# Patient Record
Sex: MALE | Race: WHITE | Employment: OTHER
[De-identification: names, ages, dates, MRNs, and addresses within clinical notes are randomized per-mention and may not be internally consistent; named-entity substitution may affect disease eponyms.]

---

## 2017-02-06 ENCOUNTER — OFFICE VISIT (OUTPATIENT)
Dept: CARDIOLOGY | Facility: CLINIC | Age: 67
End: 2017-02-06

## 2017-02-06 VITALS
WEIGHT: 262.2 LBS | HEIGHT: 70 IN | HEART RATE: 76 BPM | DIASTOLIC BLOOD PRESSURE: 83 MMHG | SYSTOLIC BLOOD PRESSURE: 131 MMHG | BODY MASS INDEX: 37.54 KG/M2

## 2017-02-06 DIAGNOSIS — I10 ESSENTIAL HYPERTENSION: ICD-10-CM

## 2017-02-06 DIAGNOSIS — I25.10 ASHD (ARTERIOSCLEROTIC HEART DISEASE): Primary | ICD-10-CM

## 2017-02-06 DIAGNOSIS — I50.32 CHRONIC DIASTOLIC CHF (CONGESTIVE HEART FAILURE), NYHA CLASS 3 (HCC): ICD-10-CM

## 2017-02-06 DIAGNOSIS — G47.33 OSA (OBSTRUCTIVE SLEEP APNEA): ICD-10-CM

## 2017-02-06 DIAGNOSIS — E66.01 SEVERE OBESITY (BMI 35.0-39.9): ICD-10-CM

## 2017-02-06 PROCEDURE — 93000 ELECTROCARDIOGRAM COMPLETE: CPT | Performed by: INTERNAL MEDICINE

## 2017-02-06 PROCEDURE — G8427 DOCREV CUR MEDS BY ELIG CLIN: HCPCS | Performed by: INTERNAL MEDICINE

## 2017-02-06 PROCEDURE — G8598 ASA/ANTIPLAT THER USED: HCPCS | Performed by: INTERNAL MEDICINE

## 2017-02-06 PROCEDURE — 4040F PNEUMOC VAC/ADMIN/RCVD: CPT | Performed by: INTERNAL MEDICINE

## 2017-02-06 PROCEDURE — G8484 FLU IMMUNIZE NO ADMIN: HCPCS | Performed by: INTERNAL MEDICINE

## 2017-02-06 PROCEDURE — 1123F ACP DISCUSS/DSCN MKR DOCD: CPT | Performed by: INTERNAL MEDICINE

## 2017-02-06 PROCEDURE — 99214 OFFICE O/P EST MOD 30 MIN: CPT | Performed by: INTERNAL MEDICINE

## 2017-02-06 PROCEDURE — 1036F TOBACCO NON-USER: CPT | Performed by: INTERNAL MEDICINE

## 2017-02-06 PROCEDURE — G8419 CALC BMI OUT NRM PARAM NOF/U: HCPCS | Performed by: INTERNAL MEDICINE

## 2017-02-06 PROCEDURE — 3017F COLORECTAL CA SCREEN DOC REV: CPT | Performed by: INTERNAL MEDICINE

## 2017-03-30 ENCOUNTER — OFFICE VISIT (OUTPATIENT)
Dept: PAIN MANAGEMENT | Age: 67
End: 2017-03-30
Payer: MEDICARE

## 2017-03-30 ENCOUNTER — HOSPITAL ENCOUNTER (OUTPATIENT)
Age: 67
Discharge: HOME | End: 2017-03-30
Payer: MEDICARE

## 2017-03-30 VITALS
BODY MASS INDEX: 38.65 KG/M2 | RESPIRATION RATE: 18 BRPM | HEIGHT: 70 IN | HEART RATE: 70 BPM | SYSTOLIC BLOOD PRESSURE: 135 MMHG | WEIGHT: 270 LBS | TEMPERATURE: 96.8 F | DIASTOLIC BLOOD PRESSURE: 81 MMHG

## 2017-03-30 DIAGNOSIS — M79.672 BILATERAL FOOT PAIN: ICD-10-CM

## 2017-03-30 DIAGNOSIS — M79.605 BILATERAL LEG PAIN: ICD-10-CM

## 2017-03-30 DIAGNOSIS — M79.604 BILATERAL LEG PAIN: ICD-10-CM

## 2017-03-30 DIAGNOSIS — M54.40 LOW BACK PAIN WITH SCIATICA, SCIATICA LATERALITY UNSPECIFIED, UNSPECIFIED BACK PAIN LATERALITY, UNSPECIFIED CHRONICITY: ICD-10-CM

## 2017-03-30 DIAGNOSIS — E66.01 MORBID OBESITY DUE TO EXCESS CALORIES (HCC): ICD-10-CM

## 2017-03-30 DIAGNOSIS — G89.29 OTHER CHRONIC PAIN: ICD-10-CM

## 2017-03-30 DIAGNOSIS — M79.671 BILATERAL FOOT PAIN: ICD-10-CM

## 2017-03-30 PROCEDURE — 4040F PNEUMOC VAC/ADMIN/RCVD: CPT | Performed by: PHYSICAL MEDICINE & REHABILITATION

## 2017-03-30 PROCEDURE — 99213 OFFICE O/P EST LOW 20 MIN: CPT | Performed by: PHYSICAL MEDICINE & REHABILITATION

## 2017-03-30 PROCEDURE — 1123F ACP DISCUSS/DSCN MKR DOCD: CPT | Performed by: PHYSICAL MEDICINE & REHABILITATION

## 2017-03-30 PROCEDURE — 1036F TOBACCO NON-USER: CPT | Performed by: PHYSICAL MEDICINE & REHABILITATION

## 2017-03-30 PROCEDURE — G8417 CALC BMI ABV UP PARAM F/U: HCPCS | Performed by: PHYSICAL MEDICINE & REHABILITATION

## 2017-03-30 PROCEDURE — G8484 FLU IMMUNIZE NO ADMIN: HCPCS | Performed by: PHYSICAL MEDICINE & REHABILITATION

## 2017-03-30 PROCEDURE — 3017F COLORECTAL CA SCREEN DOC REV: CPT | Performed by: PHYSICAL MEDICINE & REHABILITATION

## 2017-03-30 PROCEDURE — G8598 ASA/ANTIPLAT THER USED: HCPCS | Performed by: PHYSICAL MEDICINE & REHABILITATION

## 2017-03-30 PROCEDURE — 99211 OFF/OP EST MAY X REQ PHY/QHP: CPT | Performed by: PHYSICAL MEDICINE & REHABILITATION

## 2017-03-30 PROCEDURE — G8427 DOCREV CUR MEDS BY ELIG CLIN: HCPCS | Performed by: PHYSICAL MEDICINE & REHABILITATION

## 2017-03-30 RX ORDER — HYDROCODONE BITARTRATE AND ACETAMINOPHEN 5; 325 MG/1; MG/1
1 TABLET ORAL 2 TIMES DAILY PRN
Qty: 60 TABLET | Refills: 0 | Status: SHIPPED | OUTPATIENT
Start: 2017-06-03 | End: 2017-06-22 | Stop reason: SDUPTHER

## 2017-03-30 RX ORDER — HYDROCODONE BITARTRATE AND ACETAMINOPHEN 5; 325 MG/1; MG/1
1 TABLET ORAL 2 TIMES DAILY PRN
Qty: 60 TABLET | Refills: 0 | Status: SHIPPED | OUTPATIENT
Start: 2017-05-05 | End: 2017-06-22 | Stop reason: SDUPTHER

## 2017-03-30 RX ORDER — HYDROCODONE BITARTRATE AND ACETAMINOPHEN 5; 325 MG/1; MG/1
1 TABLET ORAL 2 TIMES DAILY PRN
Qty: 60 TABLET | Refills: 0 | Status: SHIPPED | OUTPATIENT
Start: 2017-04-06 | End: 2017-06-22 | Stop reason: SDUPTHER

## 2017-05-01 ENCOUNTER — OFFICE VISIT (OUTPATIENT)
Dept: PULMONOLOGY | Age: 67
End: 2017-05-01
Payer: MEDICARE

## 2017-05-01 VITALS
HEIGHT: 70 IN | HEART RATE: 72 BPM | BODY MASS INDEX: 38.65 KG/M2 | OXYGEN SATURATION: 96 % | DIASTOLIC BLOOD PRESSURE: 80 MMHG | TEMPERATURE: 98.2 F | SYSTOLIC BLOOD PRESSURE: 147 MMHG | WEIGHT: 270 LBS | RESPIRATION RATE: 16 BRPM

## 2017-05-01 DIAGNOSIS — Z87.891 PERSONAL HISTORY OF TOBACCO USE: ICD-10-CM

## 2017-05-01 DIAGNOSIS — I50.32 CHRONIC DIASTOLIC HEART FAILURE (HCC): ICD-10-CM

## 2017-05-01 DIAGNOSIS — G47.33 OSA (OBSTRUCTIVE SLEEP APNEA): Primary | ICD-10-CM

## 2017-05-01 DIAGNOSIS — I10 ESSENTIAL HYPERTENSION: ICD-10-CM

## 2017-05-01 DIAGNOSIS — I71.20 THORACIC AORTIC ANEURYSM WITHOUT RUPTURE: ICD-10-CM

## 2017-05-01 DIAGNOSIS — J44.9 CHRONIC OBSTRUCTIVE PULMONARY DISEASE, UNSPECIFIED COPD TYPE (HCC): ICD-10-CM

## 2017-05-01 DIAGNOSIS — Z87.891 SMOKING HISTORY: ICD-10-CM

## 2017-05-01 DIAGNOSIS — E66.09 OBESITY DUE TO EXCESS CALORIES, UNSPECIFIED OBESITY SEVERITY: ICD-10-CM

## 2017-05-01 PROCEDURE — 1036F TOBACCO NON-USER: CPT | Performed by: INTERNAL MEDICINE

## 2017-05-01 PROCEDURE — 3023F SPIROM DOC REV: CPT | Performed by: INTERNAL MEDICINE

## 2017-05-01 PROCEDURE — G0296 VISIT TO DETERM LDCT ELIG: HCPCS | Performed by: INTERNAL MEDICINE

## 2017-05-01 PROCEDURE — G8417 CALC BMI ABV UP PARAM F/U: HCPCS | Performed by: INTERNAL MEDICINE

## 2017-05-01 PROCEDURE — 4040F PNEUMOC VAC/ADMIN/RCVD: CPT | Performed by: INTERNAL MEDICINE

## 2017-05-01 PROCEDURE — 99215 OFFICE O/P EST HI 40 MIN: CPT | Performed by: INTERNAL MEDICINE

## 2017-05-01 PROCEDURE — G8427 DOCREV CUR MEDS BY ELIG CLIN: HCPCS | Performed by: INTERNAL MEDICINE

## 2017-05-01 PROCEDURE — G8926 SPIRO NO PERF OR DOC: HCPCS | Performed by: INTERNAL MEDICINE

## 2017-05-01 PROCEDURE — 3017F COLORECTAL CA SCREEN DOC REV: CPT | Performed by: INTERNAL MEDICINE

## 2017-05-01 PROCEDURE — G8598 ASA/ANTIPLAT THER USED: HCPCS | Performed by: INTERNAL MEDICINE

## 2017-05-01 PROCEDURE — 1123F ACP DISCUSS/DSCN MKR DOCD: CPT | Performed by: INTERNAL MEDICINE

## 2017-05-02 ENCOUNTER — TELEPHONE (OUTPATIENT)
Dept: CASE MANAGEMENT | Age: 67
End: 2017-05-02

## 2017-06-20 DIAGNOSIS — Z79.891 LONG TERM (CURRENT) USE OF OPIATE ANALGESIC: ICD-10-CM

## 2017-06-20 DIAGNOSIS — G89.29 OTHER CHRONIC PAIN: Primary | ICD-10-CM

## 2017-06-22 ENCOUNTER — OFFICE VISIT (OUTPATIENT)
Dept: PAIN MANAGEMENT | Age: 67
End: 2017-06-22
Payer: MEDICARE

## 2017-06-22 ENCOUNTER — HOSPITAL ENCOUNTER (OUTPATIENT)
Dept: LAB | Age: 67
Setting detail: SPECIMEN
Discharge: HOME | End: 2017-06-22
Payer: MEDICARE

## 2017-06-22 VITALS
HEIGHT: 70 IN | SYSTOLIC BLOOD PRESSURE: 124 MMHG | RESPIRATION RATE: 18 BRPM | WEIGHT: 263 LBS | TEMPERATURE: 97.8 F | DIASTOLIC BLOOD PRESSURE: 74 MMHG | BODY MASS INDEX: 37.65 KG/M2 | HEART RATE: 75 BPM

## 2017-06-22 DIAGNOSIS — M79.604 BILATERAL LEG PAIN: ICD-10-CM

## 2017-06-22 DIAGNOSIS — G89.29 CHRONIC LOW BACK PAIN WITH SCIATICA, SCIATICA LATERALITY UNSPECIFIED, UNSPECIFIED BACK PAIN LATERALITY: ICD-10-CM

## 2017-06-22 DIAGNOSIS — M54.40 LOW BACK PAIN WITH SCIATICA, SCIATICA LATERALITY UNSPECIFIED, UNSPECIFIED BACK PAIN LATERALITY, UNSPECIFIED CHRONICITY: ICD-10-CM

## 2017-06-22 DIAGNOSIS — M79.671 BILATERAL FOOT PAIN: ICD-10-CM

## 2017-06-22 DIAGNOSIS — G89.29 OTHER CHRONIC PAIN: Primary | ICD-10-CM

## 2017-06-22 DIAGNOSIS — M79.605 BILATERAL LEG PAIN: ICD-10-CM

## 2017-06-22 DIAGNOSIS — Z79.891 LONG TERM (CURRENT) USE OF OPIATE ANALGESIC: ICD-10-CM

## 2017-06-22 DIAGNOSIS — E66.01 MORBID OBESITY DUE TO EXCESS CALORIES (HCC): ICD-10-CM

## 2017-06-22 DIAGNOSIS — M54.40 CHRONIC LOW BACK PAIN WITH SCIATICA, SCIATICA LATERALITY UNSPECIFIED, UNSPECIFIED BACK PAIN LATERALITY: ICD-10-CM

## 2017-06-22 DIAGNOSIS — E66.01 MORBID OBESITY, UNSPECIFIED OBESITY TYPE (HCC): ICD-10-CM

## 2017-06-22 DIAGNOSIS — G89.29 OTHER CHRONIC PAIN: ICD-10-CM

## 2017-06-22 DIAGNOSIS — M79.672 BILATERAL FOOT PAIN: ICD-10-CM

## 2017-06-22 PROCEDURE — 99212 OFFICE O/P EST SF 10 MIN: CPT

## 2017-06-22 PROCEDURE — 3017F COLORECTAL CA SCREEN DOC REV: CPT | Performed by: PHYSICAL MEDICINE & REHABILITATION

## 2017-06-22 PROCEDURE — 99214 OFFICE O/P EST MOD 30 MIN: CPT | Performed by: PHYSICAL MEDICINE & REHABILITATION

## 2017-06-22 PROCEDURE — 4040F PNEUMOC VAC/ADMIN/RCVD: CPT | Performed by: PHYSICAL MEDICINE & REHABILITATION

## 2017-06-22 PROCEDURE — 1123F ACP DISCUSS/DSCN MKR DOCD: CPT | Performed by: PHYSICAL MEDICINE & REHABILITATION

## 2017-06-22 PROCEDURE — G8598 ASA/ANTIPLAT THER USED: HCPCS | Performed by: PHYSICAL MEDICINE & REHABILITATION

## 2017-06-22 PROCEDURE — 80307 DRUG TEST PRSMV CHEM ANLYZR: CPT

## 2017-06-22 PROCEDURE — G8417 CALC BMI ABV UP PARAM F/U: HCPCS | Performed by: PHYSICAL MEDICINE & REHABILITATION

## 2017-06-22 PROCEDURE — G8427 DOCREV CUR MEDS BY ELIG CLIN: HCPCS | Performed by: PHYSICAL MEDICINE & REHABILITATION

## 2017-06-22 PROCEDURE — 1036F TOBACCO NON-USER: CPT | Performed by: PHYSICAL MEDICINE & REHABILITATION

## 2017-06-22 RX ORDER — HYDROCODONE BITARTRATE AND ACETAMINOPHEN 5; 325 MG/1; MG/1
1 TABLET ORAL EVERY 8 HOURS PRN
Qty: 90 TABLET | Refills: 0 | Status: SHIPPED | OUTPATIENT
Start: 2017-06-22 | End: 2017-09-14 | Stop reason: SDUPTHER

## 2017-06-22 RX ORDER — HYDROCODONE BITARTRATE AND ACETAMINOPHEN 5; 325 MG/1; MG/1
1 TABLET ORAL EVERY 8 HOURS PRN
Qty: 90 TABLET | Refills: 0 | Status: SHIPPED | OUTPATIENT
Start: 2017-07-22 | End: 2017-08-17 | Stop reason: SDUPTHER

## 2017-06-22 RX ORDER — HYDROCODONE BITARTRATE AND ACETAMINOPHEN 5; 325 MG/1; MG/1
1 TABLET ORAL EVERY 8 HOURS PRN
Qty: 90 TABLET | Refills: 0 | Status: SHIPPED | OUTPATIENT
Start: 2017-08-21 | End: 2017-08-17 | Stop reason: SDUPTHER

## 2017-06-27 LAB
6-ACETYLMORPHINE, UR: NOT DETECTED
7-AMINOCLONAZEPAM, URINE: NOT DETECTED
ALPHA-OH-ALPRAZ, URINE: NOT DETECTED
ALPRAZOLAM, URINE: NOT DETECTED
AMPHETAMINES, URINE: NOT DETECTED
BARBITURATES, URINE: NOT DETECTED
BENZOYLECGONINE, UR: NOT DETECTED
BUPRENORPHINE URINE: NOT DETECTED
CARISOPRODOL, UR: NOT DETECTED
CLONAZEPAM, URINE: NOT DETECTED
CODEINE, URINE: NOT DETECTED
CREATININE URINE: 374.4 MG/DL (ref 20–400)
DIAZEPAM, URINE: NOT DETECTED
DRUGS EXPECTED, UR: NORMAL
EER HI RES INTERP UR: NORMAL
ETHYL GLUCURONIDE UR: NOT DETECTED
FENTANYL URINE: NOT DETECTED
HYDROCODONE, URINE: PRESENT
HYDROMORPHONE, URINE: PRESENT
LORAZEPAM, URINE: NOT DETECTED
MARIJUANA METAB, UR: NOT DETECTED
MDA, UR: NOT DETECTED
MDEA, EVE, UR: NOT DETECTED
MDMA URINE: NOT DETECTED
MEPERIDINE METAB, UR: NOT DETECTED
METHADONE, URINE: NOT DETECTED
METHAMPHETAMINE, URINE: NOT DETECTED
METHYLPHENIDATE: NOT DETECTED
MIDAZOLAM, URINE: NOT DETECTED
MORPHINE URINE: NOT DETECTED
NORBUPRENORPHINE, URINE: NOT DETECTED
NORDIAZEPAM, URINE: NOT DETECTED
NORFENTANYL, URINE: NOT DETECTED
NORHYDROCODONE, URINE: PRESENT
NOROXYCODONE, URINE: NOT DETECTED
NOROXYMORPHONE, URINE: NOT DETECTED
OXAZEPAM, URINE: NOT DETECTED
OXYCODONE URINE: NOT DETECTED
OXYMORPHONE, URINE: NOT DETECTED
PAIN MANAGEMENT DRUG PANEL INTERP, URINE: NORMAL
PAIN MGT DRUG PANEL, HI RES, UR: NORMAL
PCP,URINE: NOT DETECTED
PHENTERMINE, UR: NOT DETECTED
PROPOXYPHENE, URINE: NOT DETECTED
TAPENTADOL, URINE: NOT DETECTED
TAPENTADOL-O-SULFATE, URINE: NOT DETECTED
TEMAZEPAM, URINE: NOT DETECTED
TRAMADOL, URINE: NOT DETECTED
ZOLPIDEM, URINE: NOT DETECTED

## 2017-07-26 PROBLEM — F32.A DEPRESSION: Status: ACTIVE | Noted: 2017-07-26

## 2017-08-07 ENCOUNTER — OFFICE VISIT (OUTPATIENT)
Dept: PULMONOLOGY | Age: 67
End: 2017-08-07
Payer: MEDICARE

## 2017-08-07 VITALS
BODY MASS INDEX: 38.65 KG/M2 | OXYGEN SATURATION: 97 % | TEMPERATURE: 99.3 F | DIASTOLIC BLOOD PRESSURE: 75 MMHG | WEIGHT: 270 LBS | HEIGHT: 70 IN | HEART RATE: 79 BPM | RESPIRATION RATE: 16 BRPM | SYSTOLIC BLOOD PRESSURE: 127 MMHG

## 2017-08-07 DIAGNOSIS — I71.20 THORACIC AORTIC ANEURYSM WITHOUT RUPTURE: ICD-10-CM

## 2017-08-07 DIAGNOSIS — Z87.891 SMOKING HISTORY: ICD-10-CM

## 2017-08-07 DIAGNOSIS — I10 ESSENTIAL HYPERTENSION: ICD-10-CM

## 2017-08-07 DIAGNOSIS — E66.09 OBESITY DUE TO EXCESS CALORIES, UNSPECIFIED OBESITY SEVERITY: ICD-10-CM

## 2017-08-07 DIAGNOSIS — I50.32 CHRONIC DIASTOLIC HEART FAILURE (HCC): ICD-10-CM

## 2017-08-07 DIAGNOSIS — J44.9 CHRONIC OBSTRUCTIVE PULMONARY DISEASE, UNSPECIFIED COPD TYPE (HCC): ICD-10-CM

## 2017-08-07 DIAGNOSIS — Z87.891 PERSONAL HISTORY OF TOBACCO USE: ICD-10-CM

## 2017-08-07 DIAGNOSIS — G47.33 OSA (OBSTRUCTIVE SLEEP APNEA): Primary | ICD-10-CM

## 2017-08-07 PROCEDURE — G8427 DOCREV CUR MEDS BY ELIG CLIN: HCPCS | Performed by: INTERNAL MEDICINE

## 2017-08-07 PROCEDURE — 1123F ACP DISCUSS/DSCN MKR DOCD: CPT | Performed by: INTERNAL MEDICINE

## 2017-08-07 PROCEDURE — 4040F PNEUMOC VAC/ADMIN/RCVD: CPT | Performed by: INTERNAL MEDICINE

## 2017-08-07 PROCEDURE — 99215 OFFICE O/P EST HI 40 MIN: CPT | Performed by: INTERNAL MEDICINE

## 2017-08-07 PROCEDURE — G8417 CALC BMI ABV UP PARAM F/U: HCPCS | Performed by: INTERNAL MEDICINE

## 2017-08-07 PROCEDURE — G8926 SPIRO NO PERF OR DOC: HCPCS | Performed by: INTERNAL MEDICINE

## 2017-08-07 PROCEDURE — G8598 ASA/ANTIPLAT THER USED: HCPCS | Performed by: INTERNAL MEDICINE

## 2017-08-07 PROCEDURE — 3017F COLORECTAL CA SCREEN DOC REV: CPT | Performed by: INTERNAL MEDICINE

## 2017-08-07 PROCEDURE — 1036F TOBACCO NON-USER: CPT | Performed by: INTERNAL MEDICINE

## 2017-08-07 PROCEDURE — 3023F SPIROM DOC REV: CPT | Performed by: INTERNAL MEDICINE

## 2017-08-08 ENCOUNTER — TELEPHONE (OUTPATIENT)
Dept: PAIN MANAGEMENT | Age: 67
End: 2017-08-08

## 2017-08-08 DIAGNOSIS — M79.605 BILATERAL LEG PAIN: ICD-10-CM

## 2017-08-08 DIAGNOSIS — M54.40 LOW BACK PAIN WITH SCIATICA, SCIATICA LATERALITY UNSPECIFIED, UNSPECIFIED BACK PAIN LATERALITY, UNSPECIFIED CHRONICITY: ICD-10-CM

## 2017-08-08 DIAGNOSIS — G89.29 CHRONIC LOW BACK PAIN WITH SCIATICA, SCIATICA LATERALITY UNSPECIFIED, UNSPECIFIED BACK PAIN LATERALITY: ICD-10-CM

## 2017-08-08 DIAGNOSIS — E66.01 MORBID OBESITY DUE TO EXCESS CALORIES (HCC): ICD-10-CM

## 2017-08-08 DIAGNOSIS — G89.29 OTHER CHRONIC PAIN: ICD-10-CM

## 2017-08-08 DIAGNOSIS — M79.604 BILATERAL LEG PAIN: ICD-10-CM

## 2017-08-08 DIAGNOSIS — M54.40 CHRONIC LOW BACK PAIN WITH SCIATICA, SCIATICA LATERALITY UNSPECIFIED, UNSPECIFIED BACK PAIN LATERALITY: ICD-10-CM

## 2017-08-08 DIAGNOSIS — M79.672 BILATERAL FOOT PAIN: ICD-10-CM

## 2017-08-08 DIAGNOSIS — M79.671 BILATERAL FOOT PAIN: ICD-10-CM

## 2017-08-08 DIAGNOSIS — E66.01 MORBID OBESITY, UNSPECIFIED OBESITY TYPE (HCC): ICD-10-CM

## 2017-08-14 ENCOUNTER — OFFICE VISIT (OUTPATIENT)
Dept: CARDIOLOGY | Age: 67
End: 2017-08-14
Payer: MEDICARE

## 2017-08-14 VITALS
OXYGEN SATURATION: 96 % | SYSTOLIC BLOOD PRESSURE: 135 MMHG | HEIGHT: 70 IN | BODY MASS INDEX: 39.43 KG/M2 | HEART RATE: 67 BPM | WEIGHT: 275.4 LBS | DIASTOLIC BLOOD PRESSURE: 76 MMHG

## 2017-08-14 DIAGNOSIS — E78.2 MIXED HYPERLIPIDEMIA: ICD-10-CM

## 2017-08-14 DIAGNOSIS — I50.32 CHRONIC DIASTOLIC CHF (CONGESTIVE HEART FAILURE), NYHA CLASS 3 (HCC): ICD-10-CM

## 2017-08-14 DIAGNOSIS — I10 ESSENTIAL HYPERTENSION: ICD-10-CM

## 2017-08-14 DIAGNOSIS — M79.604 BILATERAL LEG PAIN: ICD-10-CM

## 2017-08-14 DIAGNOSIS — M79.605 BILATERAL LEG PAIN: ICD-10-CM

## 2017-08-14 DIAGNOSIS — I34.0 NON-RHEUMATIC MITRAL REGURGITATION: ICD-10-CM

## 2017-08-14 DIAGNOSIS — Z86.73 PERSONAL HISTORY OF TIA (TRANSIENT ISCHEMIC ATTACK): ICD-10-CM

## 2017-08-14 DIAGNOSIS — E78.5 DYSLIPIDEMIA: Primary | ICD-10-CM

## 2017-08-14 DIAGNOSIS — K21.9 GASTROESOPHAGEAL REFLUX DISEASE WITHOUT ESOPHAGITIS: ICD-10-CM

## 2017-08-14 DIAGNOSIS — G47.33 OSA (OBSTRUCTIVE SLEEP APNEA): ICD-10-CM

## 2017-08-14 PROCEDURE — 4040F PNEUMOC VAC/ADMIN/RCVD: CPT | Performed by: INTERNAL MEDICINE

## 2017-08-14 PROCEDURE — G8427 DOCREV CUR MEDS BY ELIG CLIN: HCPCS | Performed by: INTERNAL MEDICINE

## 2017-08-14 PROCEDURE — 3017F COLORECTAL CA SCREEN DOC REV: CPT | Performed by: INTERNAL MEDICINE

## 2017-08-14 PROCEDURE — G8417 CALC BMI ABV UP PARAM F/U: HCPCS | Performed by: INTERNAL MEDICINE

## 2017-08-14 PROCEDURE — G8598 ASA/ANTIPLAT THER USED: HCPCS | Performed by: INTERNAL MEDICINE

## 2017-08-14 PROCEDURE — 1123F ACP DISCUSS/DSCN MKR DOCD: CPT | Performed by: INTERNAL MEDICINE

## 2017-08-14 PROCEDURE — 99214 OFFICE O/P EST MOD 30 MIN: CPT | Performed by: INTERNAL MEDICINE

## 2017-08-14 PROCEDURE — 1036F TOBACCO NON-USER: CPT | Performed by: INTERNAL MEDICINE

## 2017-08-17 RX ORDER — HYDROCODONE BITARTRATE AND ACETAMINOPHEN 5; 325 MG/1; MG/1
1 TABLET ORAL EVERY 8 HOURS PRN
Qty: 90 TABLET | Refills: 0 | Status: SHIPPED | OUTPATIENT
Start: 2017-09-08 | End: 2017-09-14 | Stop reason: SDUPTHER

## 2017-09-14 ENCOUNTER — OFFICE VISIT (OUTPATIENT)
Dept: PAIN MANAGEMENT | Age: 67
End: 2017-09-14
Payer: MEDICARE

## 2017-09-14 VITALS
SYSTOLIC BLOOD PRESSURE: 164 MMHG | WEIGHT: 271 LBS | TEMPERATURE: 97.9 F | HEART RATE: 95 BPM | BODY MASS INDEX: 38.88 KG/M2 | DIASTOLIC BLOOD PRESSURE: 102 MMHG | RESPIRATION RATE: 18 BRPM

## 2017-09-14 DIAGNOSIS — M79.604 BILATERAL LEG PAIN: ICD-10-CM

## 2017-09-14 DIAGNOSIS — E66.01 MORBID OBESITY DUE TO EXCESS CALORIES (HCC): ICD-10-CM

## 2017-09-14 DIAGNOSIS — M54.40 LOW BACK PAIN WITH SCIATICA, SCIATICA LATERALITY UNSPECIFIED, UNSPECIFIED BACK PAIN LATERALITY, UNSPECIFIED CHRONICITY: ICD-10-CM

## 2017-09-14 DIAGNOSIS — G89.29 OTHER CHRONIC PAIN: Primary | ICD-10-CM

## 2017-09-14 DIAGNOSIS — E66.01 MORBID OBESITY, UNSPECIFIED OBESITY TYPE (HCC): ICD-10-CM

## 2017-09-14 DIAGNOSIS — M79.671 BILATERAL FOOT PAIN: ICD-10-CM

## 2017-09-14 DIAGNOSIS — M79.672 BILATERAL FOOT PAIN: ICD-10-CM

## 2017-09-14 DIAGNOSIS — M54.40 CHRONIC LOW BACK PAIN WITH SCIATICA, SCIATICA LATERALITY UNSPECIFIED, UNSPECIFIED BACK PAIN LATERALITY: ICD-10-CM

## 2017-09-14 DIAGNOSIS — G89.29 CHRONIC LOW BACK PAIN WITH SCIATICA, SCIATICA LATERALITY UNSPECIFIED, UNSPECIFIED BACK PAIN LATERALITY: ICD-10-CM

## 2017-09-14 DIAGNOSIS — M79.605 BILATERAL LEG PAIN: ICD-10-CM

## 2017-09-14 PROCEDURE — G8427 DOCREV CUR MEDS BY ELIG CLIN: HCPCS | Performed by: PHYSICAL MEDICINE & REHABILITATION

## 2017-09-14 PROCEDURE — G8598 ASA/ANTIPLAT THER USED: HCPCS | Performed by: PHYSICAL MEDICINE & REHABILITATION

## 2017-09-14 PROCEDURE — 99211 OFF/OP EST MAY X REQ PHY/QHP: CPT | Performed by: COUNSELOR

## 2017-09-14 PROCEDURE — 1036F TOBACCO NON-USER: CPT | Performed by: PHYSICAL MEDICINE & REHABILITATION

## 2017-09-14 PROCEDURE — 3017F COLORECTAL CA SCREEN DOC REV: CPT | Performed by: PHYSICAL MEDICINE & REHABILITATION

## 2017-09-14 PROCEDURE — 4040F PNEUMOC VAC/ADMIN/RCVD: CPT | Performed by: PHYSICAL MEDICINE & REHABILITATION

## 2017-09-14 PROCEDURE — G8417 CALC BMI ABV UP PARAM F/U: HCPCS | Performed by: PHYSICAL MEDICINE & REHABILITATION

## 2017-09-14 PROCEDURE — 99214 OFFICE O/P EST MOD 30 MIN: CPT | Performed by: PHYSICAL MEDICINE & REHABILITATION

## 2017-09-14 PROCEDURE — 1123F ACP DISCUSS/DSCN MKR DOCD: CPT | Performed by: PHYSICAL MEDICINE & REHABILITATION

## 2017-09-14 RX ORDER — HYDROCODONE BITARTRATE AND ACETAMINOPHEN 5; 325 MG/1; MG/1
1 TABLET ORAL EVERY 8 HOURS PRN
Qty: 90 TABLET | Refills: 0 | Status: SHIPPED | OUTPATIENT
Start: 2017-09-14 | End: 2017-12-14 | Stop reason: SDUPTHER

## 2017-09-14 RX ORDER — HYDROCODONE BITARTRATE AND ACETAMINOPHEN 5; 325 MG/1; MG/1
1 TABLET ORAL EVERY 8 HOURS PRN
Qty: 90 TABLET | Refills: 0 | Status: SHIPPED | OUTPATIENT
Start: 2017-10-14 | End: 2017-12-14 | Stop reason: SDUPTHER

## 2017-09-14 RX ORDER — HYDROCODONE BITARTRATE AND ACETAMINOPHEN 5; 325 MG/1; MG/1
1 TABLET ORAL EVERY 8 HOURS PRN
Qty: 90 TABLET | Refills: 0 | Status: SHIPPED | OUTPATIENT
Start: 2017-11-13 | End: 2017-12-14 | Stop reason: SDUPTHER

## 2017-12-14 ENCOUNTER — OFFICE VISIT (OUTPATIENT)
Dept: PAIN MANAGEMENT | Age: 67
End: 2017-12-14
Payer: MEDICARE

## 2017-12-14 VITALS
DIASTOLIC BLOOD PRESSURE: 65 MMHG | TEMPERATURE: 97 F | SYSTOLIC BLOOD PRESSURE: 117 MMHG | RESPIRATION RATE: 20 BRPM | WEIGHT: 275 LBS | HEART RATE: 83 BPM | BODY MASS INDEX: 39.37 KG/M2 | HEIGHT: 70 IN

## 2017-12-14 DIAGNOSIS — M54.40 LOW BACK PAIN WITH SCIATICA, SCIATICA LATERALITY UNSPECIFIED, UNSPECIFIED BACK PAIN LATERALITY, UNSPECIFIED CHRONICITY: ICD-10-CM

## 2017-12-14 DIAGNOSIS — M79.604 BILATERAL LEG PAIN: ICD-10-CM

## 2017-12-14 DIAGNOSIS — G89.29 CHRONIC LOW BACK PAIN WITH SCIATICA, SCIATICA LATERALITY UNSPECIFIED, UNSPECIFIED BACK PAIN LATERALITY: ICD-10-CM

## 2017-12-14 DIAGNOSIS — G89.29 OTHER CHRONIC PAIN: ICD-10-CM

## 2017-12-14 DIAGNOSIS — E66.01 MORBID OBESITY, UNSPECIFIED OBESITY TYPE (HCC): ICD-10-CM

## 2017-12-14 DIAGNOSIS — M79.605 BILATERAL LEG PAIN: ICD-10-CM

## 2017-12-14 DIAGNOSIS — M54.40 CHRONIC LOW BACK PAIN WITH SCIATICA, SCIATICA LATERALITY UNSPECIFIED, UNSPECIFIED BACK PAIN LATERALITY: ICD-10-CM

## 2017-12-14 DIAGNOSIS — M79.671 BILATERAL FOOT PAIN: ICD-10-CM

## 2017-12-14 DIAGNOSIS — E66.01 MORBID OBESITY DUE TO EXCESS CALORIES (HCC): ICD-10-CM

## 2017-12-14 DIAGNOSIS — M79.672 BILATERAL FOOT PAIN: ICD-10-CM

## 2017-12-14 PROCEDURE — 99211 OFF/OP EST MAY X REQ PHY/QHP: CPT

## 2017-12-14 PROCEDURE — 99214 OFFICE O/P EST MOD 30 MIN: CPT | Performed by: PHYSICAL MEDICINE & REHABILITATION

## 2017-12-14 RX ORDER — HYDROCODONE BITARTRATE AND ACETAMINOPHEN 5; 325 MG/1; MG/1
1 TABLET ORAL EVERY 8 HOURS PRN
COMMUNITY
End: 2019-03-12 | Stop reason: ALTCHOICE

## 2017-12-14 RX ORDER — HYDROCODONE BITARTRATE AND ACETAMINOPHEN 5; 325 MG/1; MG/1
1 TABLET ORAL EVERY 8 HOURS PRN
Qty: 90 TABLET | Refills: 0 | Status: SHIPPED | OUTPATIENT
Start: 2017-12-22 | End: 2018-03-15 | Stop reason: SDUPTHER

## 2017-12-14 RX ORDER — HYDROCODONE BITARTRATE AND ACETAMINOPHEN 5; 325 MG/1; MG/1
1 TABLET ORAL EVERY 8 HOURS PRN
Qty: 90 TABLET | Refills: 0 | Status: SHIPPED | OUTPATIENT
Start: 2018-01-21 | End: 2018-03-15 | Stop reason: SDUPTHER

## 2017-12-14 RX ORDER — HYDROCODONE BITARTRATE AND ACETAMINOPHEN 5; 325 MG/1; MG/1
1 TABLET ORAL EVERY 8 HOURS PRN
Qty: 90 TABLET | Refills: 0 | Status: SHIPPED | OUTPATIENT
Start: 2018-02-20 | End: 2018-03-15 | Stop reason: SDUPTHER

## 2017-12-14 NOTE — PROGRESS NOTES
Subjective:      Patient ID: Nuzhat Obando is a 75 y.o. male. Visit Information    Have you changed or started any medications since your last visit including any over-the-counter medicines, vitamins, or herbal medicines? no     Are you having any side effects from any of your medications? -  no    Any difficulty with constipation? No    Do you feel rested upon wakening? Yes      Have you stopped taking any of your medications or changed how you are taking your medication? Is so, why? -  no    What are you able to do with the current treatment plan that you were not able to do prior to treatment?decreases the pain    Have you seen any other physician or provider since your last visit? Have you had any other diagnostic tests since your last visit? Have you been seen in the emergency room and/or had an admission to a hospital since we last saw you? No    Did you receive pain medication from another provider? No    Are you avoiding alcohol? Yes    Have you activated your [employer] account? If not, what are your barriers? No       Leg Pain   Pertinent negatives include no numbness. Back Pain  Associated symptoms include chest pain, headaches and leg pain. Pertinent negatives include no abdominal pain, fever or numbness. Knee Pain   Pertinent negatives include no numbness. Nuzhat Obando feels symptoms of bilateral knee, lower leg and low back are persistent. Patient has no new complaint today. Patient feels home exercise program has helped them to function better with ADL's. Patient feels medicine has helped them function better with ADL's. Patient reports their average pain score is 7/10  with medication and at worst 10+/10 with out pain medication. Patient feels like our current treatment program is helping over all. Nuzhat Obando states they do not have changes to there Family History since the last visit.    Nuzhat Obando states they do not have changes to there Social History right and left without any gross abnormalities. Psychiatric:  Patient is alert and show normal insight. Patient has normal affect. Musculoskeletal:  Gait appears to be abnormal with ambulating. No edema noted. No tenderness. Assessment:         ICD-10-CM ICD-9-CM    1. Bilateral leg pain M79.604 729.5 HYDROcodone-acetaminophen (NORCO) 5-325 MG per tablet    M79.605  HYDROcodone-acetaminophen (NORCO) 5-325 MG per tablet      HYDROcodone-acetaminophen (NORCO) 5-325 MG per tablet   2. Bilateral foot pain M79.671 729.5 HYDROcodone-acetaminophen (NORCO) 5-325 MG per tablet    M79.672  HYDROcodone-acetaminophen (NORCO) 5-325 MG per tablet      HYDROcodone-acetaminophen (NORCO) 5-325 MG per tablet   3. Low back pain with sciatica, sciatica laterality unspecified, unspecified back pain laterality, unspecified chronicity M54.40 724.3 HYDROcodone-acetaminophen (NORCO) 5-325 MG per tablet      HYDROcodone-acetaminophen (NORCO) 5-325 MG per tablet      HYDROcodone-acetaminophen (NORCO) 5-325 MG per tablet   4. Morbid obesity due to excess calories ([facility]) E66.01 278.01 HYDROcodone-acetaminophen (NORCO) 5-325 MG per tablet      HYDROcodone-acetaminophen (NORCO) 5-325 MG per tablet      HYDROcodone-acetaminophen (NORCO) 5-325 MG per tablet   5. Other chronic pain G89.29 338.29 HYDROcodone-acetaminophen (NORCO) 5-325 MG per tablet      HYDROcodone-acetaminophen (NORCO) 5-325 MG per tablet      HYDROcodone-acetaminophen (NORCO) 5-325 MG per tablet   6. Chronic low back pain with sciatica, sciatica laterality unspecified, unspecified back pain laterality M54.40 724.2 HYDROcodone-acetaminophen (NORCO) 5-325 MG per tablet    G89.29 724.3 HYDROcodone-acetaminophen (NORCO) 5-325 MG per tablet     338.29 HYDROcodone-acetaminophen (NORCO) 5-325 MG per tablet   7.  Morbid obesity, unspecified obesity type ([facility] E66.01 278.01 HYDROcodone-acetaminophen (NORCO) 5-325 MG per tablet      HYDROcodone-acetaminophen (NORCO) 5-325 MG per tablet      HYDROcodone-acetaminophen (NORCO) 5-325 MG per tablet           Plan:       In my professional opinion I believe that the Magen Sears should continue their current home exercise program which is both medically appropriate and necessary to reduce pain and increase functioning. In my professional opinion I believe that Magen Sears should continue their current medications as shown in the current medication section. Because it's reasonably related to the allowed conditions and are medically necessary and appropriate for treatment and control of associated symptoms. I would also like Magen Orion to discontinue the following medications:    Discontinued Medications    No medications on file       I would like Magen Sears to use the following medications:    Modified Medications    Modified Medication Previous Medication    HYDROCODONE-ACETAMINOPHEN (NORCO) 5-325 MG PER TABLET HYDROcodone-acetaminophen (NORCO) 5-325 MG per tablet       Take 1 tablet by mouth every 8 hours as needed for Pain . Take 1 tablet by mouth every 8 hours as needed for Pain . HYDROCODONE-ACETAMINOPHEN (NORCO) 5-325 MG PER TABLET HYDROcodone-acetaminophen (NORCO) 5-325 MG per tablet       Take 1 tablet by mouth every 8 hours as needed for Pain . Take 1 tablet by mouth every 8 hours as needed for Pain . HYDROCODONE-ACETAMINOPHEN (NORCO) 5-325 MG PER TABLET HYDROcodone-acetaminophen (NORCO) 5-325 MG per tablet       Take 1 tablet by mouth every 8 hours as needed for Pain . Take 1 tablet by mouth every 8 hours as needed for Pain . I would like Magen Orion to use the following medications:    New Prescriptions    No medications on file     I discussed with the Magen Seras about the management of controlled medications being prescribed in the current medication section. I instructed the patient on random urine tox screens, pill counts and OARRS reporting. Also instructed on letting us know if any other physicians are writing medications for them.  As

## 2017-12-14 NOTE — PROGRESS NOTES
Visit Information    Have you changed or started any medications since your last visit including any over-the-counter medicines, vitamins, or herbal medicines? no     Are you having any side effects from any of your medications? -  no    Any difficulty with constipation? No    Do you feel rested upon wakening? Yes      Have you stopped taking any of your medications or changed how you are taking your medication? Is so, why? -  no    What are you able to do with the current treatment plan that you were not able to do prior to treatment?decreases the pain        Have you seen any other physician or provider since your last visit? Have you had any other diagnostic tests since your last visit? Have you been seen in the emergency room and/or had an admission to a hospital since we last saw you? No    Did you receive pain medication from another provider? No    Are you avoiding alcohol? Yes    Have you activated your [employer] account? If not, what are your barriers?  No

## 2017-12-14 NOTE — PATIENT INSTRUCTIONS
SURVEY:    You may be receiving a survey from [employer] regarding your visit today. Please complete the survey to enable us to provide the highest quality of care to you and your family. If you cannot score us a very good on any question, please call the office to discuss how we could of made your experience a very good one. Thank you. Reviewed medication safety with pt today. 1.  Use one pharmacy and notify our office if a change in pharmacy occurs. 2.  Only one physician is to prescribe pain medication. 3.  Take medication as prescribed. Do NOT increase on your own. 4.  Do not take medication that does not belong to you. 5.  Lost or stolen medication will not be replaced. 6.  Refrain from using alcohol while taking narcotic pain medication. If alcohol is positive in urine a warning will be issued and second offense may    Result in discharge from our practice. Contact information MUST be kept current. Prior authorizations for medication can take 24 to 72 business hours. Refill requests should be made a week in advance if needed. It is the patient's responsibility to notify our office of refill requests NOT the pharmacy. Remember that refills should be addressed at your visit to prevent delays in your refill. Regarding your billing: This is a provider based clinic. Therefore you will receive 2 bills. One from the hospital billing service and one from E - the physicians billing service.

## 2018-03-15 ENCOUNTER — OFFICE VISIT (OUTPATIENT)
Dept: PAIN MANAGEMENT | Age: 68
End: 2018-03-15
Payer: MEDICARE

## 2018-03-15 VITALS
WEIGHT: 269 LBS | BODY MASS INDEX: 38.51 KG/M2 | RESPIRATION RATE: 20 BRPM | HEIGHT: 70 IN | HEART RATE: 97 BPM | TEMPERATURE: 97.9 F | DIASTOLIC BLOOD PRESSURE: 77 MMHG | SYSTOLIC BLOOD PRESSURE: 139 MMHG

## 2018-03-15 DIAGNOSIS — G89.29 CHRONIC LOW BACK PAIN WITH SCIATICA, SCIATICA LATERALITY UNSPECIFIED, UNSPECIFIED BACK PAIN LATERALITY: ICD-10-CM

## 2018-03-15 DIAGNOSIS — M54.40 LOW BACK PAIN WITH SCIATICA, SCIATICA LATERALITY UNSPECIFIED, UNSPECIFIED BACK PAIN LATERALITY, UNSPECIFIED CHRONICITY: ICD-10-CM

## 2018-03-15 DIAGNOSIS — E66.01 MORBID OBESITY DUE TO EXCESS CALORIES (HCC): ICD-10-CM

## 2018-03-15 DIAGNOSIS — M79.671 BILATERAL FOOT PAIN: ICD-10-CM

## 2018-03-15 DIAGNOSIS — M79.604 BILATERAL LEG PAIN: ICD-10-CM

## 2018-03-15 DIAGNOSIS — M79.672 BILATERAL FOOT PAIN: ICD-10-CM

## 2018-03-15 DIAGNOSIS — M54.40 CHRONIC LOW BACK PAIN WITH SCIATICA, SCIATICA LATERALITY UNSPECIFIED, UNSPECIFIED BACK PAIN LATERALITY: ICD-10-CM

## 2018-03-15 DIAGNOSIS — E66.01 MORBID OBESITY, UNSPECIFIED OBESITY TYPE (HCC): ICD-10-CM

## 2018-03-15 DIAGNOSIS — M79.605 BILATERAL LEG PAIN: ICD-10-CM

## 2018-03-15 DIAGNOSIS — G89.29 OTHER CHRONIC PAIN: Primary | ICD-10-CM

## 2018-03-15 PROCEDURE — 99211 OFF/OP EST MAY X REQ PHY/QHP: CPT

## 2018-03-15 PROCEDURE — 99214 OFFICE O/P EST MOD 30 MIN: CPT | Performed by: PHYSICAL MEDICINE & REHABILITATION

## 2018-03-15 RX ORDER — HYDROCODONE BITARTRATE AND ACETAMINOPHEN 5; 325 MG/1; MG/1
1 TABLET ORAL EVERY 8 HOURS PRN
Qty: 90 TABLET | Refills: 0 | Status: SHIPPED | OUTPATIENT
Start: 2018-03-30 | End: 2018-04-29

## 2018-03-15 RX ORDER — HYDROCODONE BITARTRATE AND ACETAMINOPHEN 5; 325 MG/1; MG/1
1 TABLET ORAL EVERY 8 HOURS PRN
Qty: 90 TABLET | Refills: 0 | Status: SHIPPED | OUTPATIENT
Start: 2018-05-29 | End: 2018-06-28

## 2018-03-15 RX ORDER — HYDROCODONE BITARTRATE AND ACETAMINOPHEN 5; 325 MG/1; MG/1
1 TABLET ORAL EVERY 8 HOURS PRN
Qty: 90 TABLET | Refills: 0 | Status: SHIPPED | OUTPATIENT
Start: 2018-04-29 | End: 2018-05-29

## 2018-03-15 NOTE — PROGRESS NOTES
Visit Information    Have you changed or started any medications since your last visit including any over-the-counter medicines, vitamins, or herbal medicines? no     Are you having any side effects from any of your medications? -  no    Any difficulty with constipation? No    Do you feel rested upon wakening? Yes      Have you stopped taking any of your medications or changed how you are taking your medication? Is so, why? -  no    What are you able to do with the current treatment plan that you were not able to do prior to treatment? Helps decrease pain in my leg        Have you seen any other physician or provider since your last visit? PCP      Have you had any other diagnostic tests since your last visit? Have you been seen in the emergency room and/or had an admission to a hospital since we last saw you? No    Did you receive pain medication from another provider? No    Are you avoiding alcohol? Yes    Have you activated your [employer] account? If not, what are your barriers?  No

## 2018-03-15 NOTE — PATIENT INSTRUCTIONS
SURVEY:    You may be receiving a survey from [employer] regarding your visit today. Please complete the survey to enable us to provide the highest quality of care to you and your family. If you cannot score us a very good on any question, please call the office to discuss how we could of made your experience a very good one. Thank you. Reviewed medication safety with pt today. 1.  Use one pharmacy and notify our office if a change in pharmacy occurs. 2.  Only one physician is to prescribe pain medication. 3.  Take medication as prescribed. Do NOT increase on your own. 4.  Do not take medication that does not belong to you. 5.  Lost or stolen medication will not be replaced. 6.  Refrain from using alcohol while taking narcotic pain medication. If alcohol is positive in urine a warning will be issued and second offense may    Result in discharge from our practice. Contact information MUST be kept current. Prior authorizations for medication can take 24 to 72 business hours. Refill requests should be made a week in advance if needed. It is the patient's responsibility to notify our office of refill requests NOT the pharmacy. Remember that refills should be addressed at your visit to prevent delays in your refill. Regarding your billing: This is a provider based clinic. Therefore you will receive 2 bills. One from the hospital billing service and one from E - the physicians billing service.

## 2018-03-15 NOTE — PROGRESS NOTES
grossly intact on the right and left without any gross abnormalities. Psychiatric:  Patient is alert and show normal insight. Patient has normal affect. Musculoskeletal:  Gait appears to be abnormal with ambulating. No edema noted. No tenderness. Assessment:         ICD-10-CM ICD-9-CM    1. Other chronic pain G89.29 338.29 HYDROcodone-acetaminophen (NORCO) 5-325 MG per tablet      HYDROcodone-acetaminophen (NORCO) 5-325 MG per tablet      HYDROcodone-acetaminophen (NORCO) 5-325 MG per tablet   2. Bilateral leg pain M79.604 729.5 HYDROcodone-acetaminophen (NORCO) 5-325 MG per tablet    M79.605  HYDROcodone-acetaminophen (NORCO) 5-325 MG per tablet      HYDROcodone-acetaminophen (NORCO) 5-325 MG per tablet   3. Bilateral foot pain M79.671 729.5 HYDROcodone-acetaminophen (NORCO) 5-325 MG per tablet    M79.672  HYDROcodone-acetaminophen (NORCO) 5-325 MG per tablet      HYDROcodone-acetaminophen (NORCO) 5-325 MG per tablet   4. Low back pain with sciatica, sciatica laterality unspecified, unspecified back pain laterality, unspecified chronicity M54.40 724.3 HYDROcodone-acetaminophen (NORCO) 5-325 MG per tablet      HYDROcodone-acetaminophen (NORCO) 5-325 MG per tablet      HYDROcodone-acetaminophen (NORCO) 5-325 MG per tablet   5. Morbid obesity due to excess calories ([facility]) E66.01 278.01 HYDROcodone-acetaminophen (NORCO) 5-325 MG per tablet      HYDROcodone-acetaminophen (NORCO) 5-325 MG per tablet      HYDROcodone-acetaminophen (NORCO) 5-325 MG per tablet   6. Chronic low back pain with sciatica, sciatica laterality unspecified, unspecified back pain laterality M54.40 724.2 HYDROcodone-acetaminophen (NORCO) 5-325 MG per tablet    G89.29 724.3 HYDROcodone-acetaminophen (NORCO) 5-325 MG per tablet     338.29 HYDROcodone-acetaminophen (NORCO) 5-325 MG per tablet   7.  Morbid obesity, unspecified obesity type ([facility] E66.01 278.01 HYDROcodone-acetaminophen (NORCO) 5-325 MG per tablet      HYDROcodone-acetaminophen (NORCO) 5-325 MG per tablet      HYDROcodone-acetaminophen (NORCO) 5-325 MG per tablet           Plan:       In my professional opinion I believe that the Dirk Baumgarten should continue their current home exercise program which is both medically appropriate and necessary to reduce pain and increase functioning. In my professional opinion I believe that Dirk Baumgarten should continue their current medications as shown in the current medication section. Because it's reasonably related to the allowed conditions and are medically necessary and appropriate for treatment and control of associated symptoms. I would also like Dirk Baumgarten to discontinue the following medications:    Discontinued Medications    No medications on file       I would like Hunter CastañedaBaumgarten to use the following medications:    Modified Medications    Modified Medication Previous Medication    HYDROCODONE-ACETAMINOPHEN (NORCO) 5-325 MG PER TABLET HYDROcodone-acetaminophen (NORCO) 5-325 MG per tablet       Take 1 tablet by mouth every 8 hours as needed for Pain for up to 30 days. Take 1 tablet by mouth every 8 hours as needed for Pain . HYDROCODONE-ACETAMINOPHEN (NORCO) 5-325 MG PER TABLET HYDROcodone-acetaminophen (NORCO) 5-325 MG per tablet       Take 1 tablet by mouth every 8 hours as needed for Pain for up to 30 days. Take 1 tablet by mouth every 8 hours as needed for Pain . HYDROCODONE-ACETAMINOPHEN (NORCO) 5-325 MG PER TABLET HYDROcodone-acetaminophen (NORCO) 5-325 MG per tablet       Take 1 tablet by mouth every 8 hours as needed for Pain for up to 30 days. Take 1 tablet by mouth every 8 hours as needed for Pain . I would like Dirk Baumgarten to use the following medications:    New Prescriptions    No medications on file     I discussed with the Dirk Baumgarten about the management of controlled medications being prescribed in the current medication section. I instructed the patient on random urine tox screens, pill counts and OARRS reporting.  Also instructed on letting us know if any other physicians are writing medications for them. As well as use of only one pharmacy. Controlled Substances Monitoring:     Attestation: The Prescription Monitoring Report for this patient was reviewed today. Calvin Cosby MD)  Documentation: No signs of potential drug abuse or diversion identified. Calvin Cosby MD)  Chronic Pain: Treatment objectives documented - patient is progressing appropriately. , Functional status reviewed - continues with improved or maintaining ADL's., Reestablished informed consent., Reviewed the patient's functional status and documentation. , Dose reduction has been attempted. Calvin Cosby MD)      I discussed with the patient side effects and danger of prescribed medicine. I will follow up with the as noted on the discharge sheet. I  [facility] will be retiring from [place] [facility] in March of 2018 and Gómez Rashid  will need to follow up with you. They have been doing well on the current medication as seen above. I hope you will be able to take care of there pain management needs.

## 2018-05-08 ENCOUNTER — OFFICE VISIT (OUTPATIENT)
Dept: GASTROENTEROLOGY | Age: 68
End: 2018-05-08
Payer: MEDICARE

## 2018-05-08 VITALS
WEIGHT: 262.5 LBS | TEMPERATURE: 98.2 F | RESPIRATION RATE: 18 BRPM | BODY MASS INDEX: 37.58 KG/M2 | DIASTOLIC BLOOD PRESSURE: 80 MMHG | HEART RATE: 90 BPM | HEIGHT: 70 IN | SYSTOLIC BLOOD PRESSURE: 127 MMHG

## 2018-05-08 DIAGNOSIS — K21.9 GASTROESOPHAGEAL REFLUX DISEASE, ESOPHAGITIS PRESENCE NOT SPECIFIED: ICD-10-CM

## 2018-05-08 DIAGNOSIS — R13.19 ESOPHAGEAL DYSPHAGIA: Primary | ICD-10-CM

## 2018-05-08 PROCEDURE — G8427 DOCREV CUR MEDS BY ELIG CLIN: HCPCS | Performed by: INTERNAL MEDICINE

## 2018-05-08 PROCEDURE — 99204 OFFICE O/P NEW MOD 45 MIN: CPT | Performed by: INTERNAL MEDICINE

## 2018-05-08 PROCEDURE — 4040F PNEUMOC VAC/ADMIN/RCVD: CPT | Performed by: INTERNAL MEDICINE

## 2018-05-08 PROCEDURE — G8417 CALC BMI ABV UP PARAM F/U: HCPCS | Performed by: INTERNAL MEDICINE

## 2018-05-08 PROCEDURE — 3017F COLORECTAL CA SCREEN DOC REV: CPT | Performed by: INTERNAL MEDICINE

## 2018-05-08 PROCEDURE — 1036F TOBACCO NON-USER: CPT | Performed by: INTERNAL MEDICINE

## 2018-05-08 PROCEDURE — 1123F ACP DISCUSS/DSCN MKR DOCD: CPT | Performed by: INTERNAL MEDICINE

## 2018-05-16 ENCOUNTER — HOSPITAL ENCOUNTER (OUTPATIENT)
Age: 68
Setting detail: OUTPATIENT SURGERY
Discharge: HOME | End: 2018-05-16
Attending: INTERNAL MEDICINE | Admitting: INTERNAL MEDICINE
Payer: MEDICARE

## 2018-05-16 ENCOUNTER — ANESTHESIA (OUTPATIENT)
Dept: OPERATING ROOM | Age: 68
End: 2018-05-16
Payer: MEDICARE

## 2018-05-16 ENCOUNTER — ANESTHESIA EVENT (OUTPATIENT)
Dept: OPERATING ROOM | Age: 68
End: 2018-05-16
Payer: MEDICARE

## 2018-05-16 VITALS
HEART RATE: 68 BPM | OXYGEN SATURATION: 92 % | RESPIRATION RATE: 16 BRPM | SYSTOLIC BLOOD PRESSURE: 113 MMHG | TEMPERATURE: 97 F | DIASTOLIC BLOOD PRESSURE: 104 MMHG

## 2018-05-16 VITALS
OXYGEN SATURATION: 96 % | SYSTOLIC BLOOD PRESSURE: 115 MMHG | RESPIRATION RATE: 14 BRPM | DIASTOLIC BLOOD PRESSURE: 67 MMHG

## 2018-05-16 PROCEDURE — 2500000003 HC RX 250 WO HCPCS: Performed by: NURSE ANESTHETIST, CERTIFIED REGISTERED

## 2018-05-16 PROCEDURE — 3609013500 HC EGD REMOVAL TUMOR POLYP/OTHER LESION SNARE TECH: Performed by: INTERNAL MEDICINE

## 2018-05-16 PROCEDURE — 3609012700 HC EGD DILATION SAVORY: Performed by: INTERNAL MEDICINE

## 2018-05-16 PROCEDURE — C1773 RET DEV, INSERTABLE: HCPCS | Performed by: INTERNAL MEDICINE

## 2018-05-16 PROCEDURE — 43248 EGD GUIDE WIRE INSERTION: CPT | Performed by: INTERNAL MEDICINE

## 2018-05-16 PROCEDURE — 3609012400 HC EGD TRANSORAL BIOPSY SINGLE/MULTIPLE: Performed by: INTERNAL MEDICINE

## 2018-05-16 PROCEDURE — 7100000010 HC PHASE II RECOVERY - FIRST 15 MIN: Performed by: INTERNAL MEDICINE

## 2018-05-16 PROCEDURE — 88342 IMHCHEM/IMCYTCHM 1ST ANTB: CPT

## 2018-05-16 PROCEDURE — 43239 EGD BIOPSY SINGLE/MULTIPLE: CPT | Performed by: INTERNAL MEDICINE

## 2018-05-16 PROCEDURE — 88305 TISSUE EXAM BY PATHOLOGIST: CPT

## 2018-05-16 PROCEDURE — 6360000002 HC RX W HCPCS: Performed by: NURSE ANESTHETIST, CERTIFIED REGISTERED

## 2018-05-16 PROCEDURE — 3700000000 HC ANESTHESIA ATTENDED CARE: Performed by: INTERNAL MEDICINE

## 2018-05-16 PROCEDURE — 7100000011 HC PHASE II RECOVERY - ADDTL 15 MIN: Performed by: INTERNAL MEDICINE

## 2018-05-16 PROCEDURE — 3700000001 HC ADD 15 MINUTES (ANESTHESIA): Performed by: INTERNAL MEDICINE

## 2018-05-16 PROCEDURE — 2580000003 HC RX 258: Performed by: INTERNAL MEDICINE

## 2018-05-16 RX ORDER — SODIUM CHLORIDE, SODIUM LACTATE, POTASSIUM CHLORIDE, CALCIUM CHLORIDE 600; 310; 30; 20 MG/100ML; MG/100ML; MG/100ML; MG/100ML
INJECTION, SOLUTION INTRAVENOUS CONTINUOUS
Status: DISCONTINUED | OUTPATIENT
Start: 2018-05-16 | End: 2018-05-16 | Stop reason: HOSPADM

## 2018-05-16 RX ORDER — LIDOCAINE HYDROCHLORIDE 20 MG/ML
INJECTION, SOLUTION INFILTRATION; PERINEURAL PRN
Status: DISCONTINUED | OUTPATIENT
Start: 2018-05-16 | End: 2018-05-16 | Stop reason: SDUPTHER

## 2018-05-16 RX ORDER — PROPOFOL 10 MG/ML
INJECTION, EMULSION INTRAVENOUS PRN
Status: DISCONTINUED | OUTPATIENT
Start: 2018-05-16 | End: 2018-05-16 | Stop reason: SDUPTHER

## 2018-05-16 RX ADMIN — LIDOCAINE HYDROCHLORIDE 100 MG: 20 INJECTION, SOLUTION INFILTRATION; PERINEURAL at 11:52

## 2018-05-16 RX ADMIN — SODIUM CHLORIDE, POTASSIUM CHLORIDE, SODIUM LACTATE AND CALCIUM CHLORIDE: 600; 310; 30; 20 INJECTION, SOLUTION INTRAVENOUS at 10:54

## 2018-05-16 RX ADMIN — PROPOFOL 100 MG: 10 INJECTION, EMULSION INTRAVENOUS at 11:59

## 2018-05-16 RX ADMIN — PROPOFOL 150 MG: 10 INJECTION, EMULSION INTRAVENOUS at 11:52

## 2018-05-16 RX ADMIN — PROPOFOL 100 MG: 10 INJECTION, EMULSION INTRAVENOUS at 11:55

## 2018-05-16 ASSESSMENT — ENCOUNTER SYMPTOMS: SHORTNESS OF BREATH: 0

## 2018-05-16 ASSESSMENT — LIFESTYLE VARIABLES: SMOKING_STATUS: 0

## 2018-05-18 LAB — SURGICAL PATHOLOGY REPORT: NORMAL

## 2018-05-19 DIAGNOSIS — K31.7 GASTRIC POLYP: Primary | ICD-10-CM

## 2018-05-21 ENCOUNTER — TELEPHONE (OUTPATIENT)
Dept: GASTROENTEROLOGY | Age: 68
End: 2018-05-21

## 2018-05-21 DIAGNOSIS — K31.7 GASTRIC POLYP: Primary | ICD-10-CM

## 2018-08-01 ENCOUNTER — HOSPITAL ENCOUNTER (INPATIENT)
Age: 68
LOS: 2 days | Discharge: HOME | DRG: 638 | End: 2018-08-03
Attending: FAMILY MEDICINE | Admitting: FAMILY MEDICINE
Payer: MEDICARE

## 2018-08-01 ENCOUNTER — APPOINTMENT (OUTPATIENT)
Dept: GENERAL RADIOLOGY | Age: 68
DRG: 638 | End: 2018-08-01
Attending: FAMILY MEDICINE
Payer: MEDICARE

## 2018-08-01 ENCOUNTER — HOSPITAL ENCOUNTER (OUTPATIENT)
Age: 68
Discharge: HOME | DRG: 638 | End: 2018-08-01
Payer: MEDICARE

## 2018-08-01 DIAGNOSIS — E11.8 TYPE 2 DIABETES MELLITUS WITH COMPLICATION, UNSPECIFIED LONG TERM INSULIN USE STATUS: ICD-10-CM

## 2018-08-01 DIAGNOSIS — E11.65 UNCONTROLLED TYPE 2 DIABETES MELLITUS WITH HYPERGLYCEMIA, UNSPECIFIED LONG TERM INSULIN USE STATUS: Primary | ICD-10-CM

## 2018-08-01 DIAGNOSIS — I50.32 CHRONIC DIASTOLIC HEART FAILURE (HCC): ICD-10-CM

## 2018-08-01 DIAGNOSIS — Z12.5 ENCOUNTER FOR PROSTATE CANCER SCREENING: ICD-10-CM

## 2018-08-01 LAB
ABSOLUTE EOS #: 0.22 K/UL (ref 0–0.44)
ABSOLUTE IMMATURE GRANULOCYTE: 0.03 K/UL (ref 0–0.3)
ABSOLUTE LYMPH #: 3.04 K/UL (ref 1.1–3.7)
ABSOLUTE MONO #: 0.62 K/UL (ref 0.1–1.2)
ALBUMIN SERPL-MCNC: 4.3 G/DL (ref 3.5–5.2)
ALBUMIN/GLOBULIN RATIO: 1.3 (ref 1–2.5)
ALP BLD-CCNC: 137 U/L (ref 40–129)
ALT SERPL-CCNC: 74 U/L (ref 5–41)
ANION GAP SERPL CALCULATED.3IONS-SCNC: 12 MMOL/L (ref 9–17)
AST SERPL-CCNC: 51 U/L
BASOPHILS # BLD: 1 % (ref 0–2)
BASOPHILS ABSOLUTE: 0.09 K/UL (ref 0–0.2)
BETA-HYDROXYBUTYRATE: 0.26 MMOL/L (ref 0.02–0.27)
BILIRUB SERPL-MCNC: 1.02 MG/DL (ref 0.3–1.2)
BUN BLDV-MCNC: 16 MG/DL (ref 8–23)
BUN/CREAT BLD: 18 (ref 9–20)
CALCIUM SERPL-MCNC: 10.5 MG/DL (ref 8.6–10.4)
CHLORIDE BLD-SCNC: 89 MMOL/L (ref 98–107)
CO2: 29 MMOL/L (ref 20–31)
CREAT SERPL-MCNC: 0.87 MG/DL (ref 0.7–1.2)
DIFFERENTIAL TYPE: NORMAL
EKG ATRIAL RATE: 75 BPM
EKG P AXIS: 43 DEGREES
EKG P-R INTERVAL: 158 MS
EKG Q-T INTERVAL: 392 MS
EKG QRS DURATION: 88 MS
EKG QTC CALCULATION (BAZETT): 437 MS
EKG R AXIS: 4 DEGREES
EKG T AXIS: 23 DEGREES
EKG VENTRICULAR RATE: 75 BPM
EOSINOPHILS RELATIVE PERCENT: 2 % (ref 1–4)
ESTIMATED AVERAGE GLUCOSE: 255 MG/DL
GFR AFRICAN AMERICAN: >60 ML/MIN
GFR NON-AFRICAN AMERICAN: >60 ML/MIN
GFR SERPL CREATININE-BSD FRML MDRD: ABNORMAL ML/MIN/{1.73_M2}
GFR SERPL CREATININE-BSD FRML MDRD: ABNORMAL ML/MIN/{1.73_M2}
GLUCOSE BLD-MCNC: 504 MG/DL (ref 74–100)
GLUCOSE BLD-MCNC: 557 MG/DL (ref 74–100)
GLUCOSE BLD-MCNC: 643 MG/DL (ref 70–99)
HBA1C MFR BLD: 10.5 % (ref 4.8–5.9)
HCT VFR BLD CALC: 41.8 % (ref 40.7–50.3)
HEMOGLOBIN: 14.1 G/DL (ref 13–17)
IMMATURE GRANULOCYTES: 0 %
LYMPHOCYTES # BLD: 33 % (ref 24–43)
MCH RBC QN AUTO: 29.6 PG (ref 25.2–33.5)
MCHC RBC AUTO-ENTMCNC: 33.7 G/DL (ref 28.4–34.8)
MCV RBC AUTO: 87.8 FL (ref 82.6–102.9)
MONOCYTES # BLD: 7 % (ref 3–12)
NRBC AUTOMATED: 0 PER 100 WBC
PDW BLD-RTO: 12.8 % (ref 11.8–14.4)
PLATELET # BLD: 239 K/UL (ref 138–453)
PLATELET ESTIMATE: NORMAL
PMV BLD AUTO: 9.9 FL (ref 8.1–13.5)
POTASSIUM SERPL-SCNC: 4.4 MMOL/L (ref 3.7–5.3)
PROSTATE SPECIFIC ANTIGEN: 0.89 UG/L
RBC # BLD: 4.76 M/UL (ref 4.21–5.77)
RBC # BLD: NORMAL 10*6/UL
SEG NEUTROPHILS: 57 % (ref 36–65)
SEGMENTED NEUTROPHILS ABSOLUTE COUNT: 5.11 K/UL (ref 1.5–8.1)
SODIUM BLD-SCNC: 130 MMOL/L (ref 135–144)
TOTAL PROTEIN: 7.6 G/DL (ref 6.4–8.3)
WBC # BLD: 9.1 K/UL (ref 3.5–11.3)
WBC # BLD: NORMAL 10*3/UL

## 2018-08-01 PROCEDURE — 85025 COMPLETE CBC W/AUTO DIFF WBC: CPT

## 2018-08-01 PROCEDURE — 2000000000 HC ICU R&B

## 2018-08-01 PROCEDURE — 2580000003 HC RX 258: Performed by: FAMILY MEDICINE

## 2018-08-01 PROCEDURE — 93005 ELECTROCARDIOGRAM TRACING: CPT

## 2018-08-01 PROCEDURE — 6370000000 HC RX 637 (ALT 250 FOR IP): Performed by: FAMILY MEDICINE

## 2018-08-01 PROCEDURE — 82010 KETONE BODYS QUAN: CPT

## 2018-08-01 PROCEDURE — 82947 ASSAY GLUCOSE BLOOD QUANT: CPT

## 2018-08-01 PROCEDURE — 80053 COMPREHEN METABOLIC PANEL: CPT

## 2018-08-01 PROCEDURE — G0103 PSA SCREENING: HCPCS

## 2018-08-01 PROCEDURE — 36415 COLL VENOUS BLD VENIPUNCTURE: CPT

## 2018-08-01 PROCEDURE — 83036 HEMOGLOBIN GLYCOSYLATED A1C: CPT

## 2018-08-01 PROCEDURE — 71046 X-RAY EXAM CHEST 2 VIEWS: CPT

## 2018-08-01 RX ORDER — BUSPIRONE HYDROCHLORIDE 5 MG/1
15 TABLET ORAL 2 TIMES DAILY
Status: DISCONTINUED | OUTPATIENT
Start: 2018-08-01 | End: 2018-08-03 | Stop reason: HOSPADM

## 2018-08-01 RX ORDER — ASPIRIN 81 MG/1
81 TABLET ORAL DAILY
Status: DISCONTINUED | OUTPATIENT
Start: 2018-08-02 | End: 2018-08-03 | Stop reason: HOSPADM

## 2018-08-01 RX ORDER — POTASSIUM CHLORIDE 7.45 MG/ML
10 INJECTION INTRAVENOUS PRN
Status: DISCONTINUED | OUTPATIENT
Start: 2018-08-01 | End: 2018-08-02

## 2018-08-01 RX ORDER — ATORVASTATIN CALCIUM 40 MG/1
40 TABLET, FILM COATED ORAL DAILY
Status: DISCONTINUED | OUTPATIENT
Start: 2018-08-02 | End: 2018-08-03 | Stop reason: HOSPADM

## 2018-08-01 RX ORDER — HYDROCODONE BITARTRATE AND ACETAMINOPHEN 5; 325 MG/1; MG/1
1 TABLET ORAL EVERY 8 HOURS PRN
Status: DISCONTINUED | OUTPATIENT
Start: 2018-08-01 | End: 2018-08-03 | Stop reason: HOSPADM

## 2018-08-01 RX ORDER — LISINOPRIL 10 MG/1
10 TABLET ORAL DAILY
Status: DISCONTINUED | OUTPATIENT
Start: 2018-08-02 | End: 2018-08-03 | Stop reason: HOSPADM

## 2018-08-01 RX ORDER — SODIUM CHLORIDE 9 MG/ML
INJECTION, SOLUTION INTRAVENOUS CONTINUOUS
Status: DISCONTINUED | OUTPATIENT
Start: 2018-08-01 | End: 2018-08-02

## 2018-08-01 RX ORDER — DEXTROSE MONOHYDRATE 25 G/50ML
12.5 INJECTION, SOLUTION INTRAVENOUS PRN
Status: DISCONTINUED | OUTPATIENT
Start: 2018-08-01 | End: 2018-08-03 | Stop reason: HOSPADM

## 2018-08-01 RX ORDER — PANTOPRAZOLE SODIUM 40 MG/1
40 TABLET, DELAYED RELEASE ORAL 2 TIMES DAILY
Status: DISCONTINUED | OUTPATIENT
Start: 2018-08-01 | End: 2018-08-03 | Stop reason: HOSPADM

## 2018-08-01 RX ORDER — ACETAMINOPHEN 325 MG/1
650 TABLET ORAL EVERY 4 HOURS PRN
Status: DISCONTINUED | OUTPATIENT
Start: 2018-08-01 | End: 2018-08-03 | Stop reason: HOSPADM

## 2018-08-01 RX ORDER — 0.9 % SODIUM CHLORIDE 0.9 %
15 INTRAVENOUS SOLUTION INTRAVENOUS ONCE
Status: COMPLETED | OUTPATIENT
Start: 2018-08-01 | End: 2018-08-01

## 2018-08-01 RX ORDER — POTASSIUM CHLORIDE 1500 MG/1
20 TABLET, FILM COATED, EXTENDED RELEASE ORAL 3 TIMES DAILY
Status: DISCONTINUED | OUTPATIENT
Start: 2018-08-01 | End: 2018-08-01

## 2018-08-01 RX ORDER — TAMSULOSIN HYDROCHLORIDE 0.4 MG/1
0.4 CAPSULE ORAL DAILY
Status: DISCONTINUED | OUTPATIENT
Start: 2018-08-02 | End: 2018-08-03 | Stop reason: HOSPADM

## 2018-08-01 RX ORDER — AMPICILLIN TRIHYDRATE 250 MG
2500 CAPSULE ORAL DAILY
Status: DISCONTINUED | OUTPATIENT
Start: 2018-08-02 | End: 2018-08-01

## 2018-08-01 RX ORDER — POTASSIUM CHLORIDE 750 MG/1
10 TABLET, EXTENDED RELEASE ORAL 3 TIMES DAILY
Status: DISCONTINUED | OUTPATIENT
Start: 2018-08-01 | End: 2018-08-01 | Stop reason: CLARIF

## 2018-08-01 RX ORDER — LANOLIN ALCOHOL/MO/W.PET/CERES
1000 CREAM (GRAM) TOPICAL DAILY
Status: DISCONTINUED | OUTPATIENT
Start: 2018-08-02 | End: 2018-08-03 | Stop reason: HOSPADM

## 2018-08-01 RX ORDER — DEXTROSE AND SODIUM CHLORIDE 5; .45 G/100ML; G/100ML
INJECTION, SOLUTION INTRAVENOUS CONTINUOUS PRN
Status: DISCONTINUED | OUTPATIENT
Start: 2018-08-01 | End: 2018-08-02

## 2018-08-01 RX ADMIN — SODIUM CHLORIDE: 9 INJECTION, SOLUTION INTRAVENOUS at 22:53

## 2018-08-01 RX ADMIN — SODIUM CHLORIDE 1724 ML: 9 INJECTION, SOLUTION INTRAVENOUS at 22:00

## 2018-08-01 RX ADMIN — SODIUM CHLORIDE 0.1 UNITS/KG/HR: 9 INJECTION, SOLUTION INTRAVENOUS at 23:13

## 2018-08-01 RX ADMIN — PANTOPRAZOLE SODIUM 40 MG: 40 TABLET, DELAYED RELEASE ORAL at 21:59

## 2018-08-01 RX ADMIN — BUSPIRONE HYDROCHLORIDE 15 MG: 5 TABLET ORAL at 21:59

## 2018-08-01 RX ADMIN — POTASSIUM CHLORIDE 30 MEQ: 20 TABLET, EXTENDED RELEASE ORAL at 21:59

## 2018-08-02 LAB
ANION GAP SERPL CALCULATED.3IONS-SCNC: 10 MMOL/L (ref 9–17)
ANION GAP SERPL CALCULATED.3IONS-SCNC: 8 MMOL/L (ref 9–17)
BUN BLDV-MCNC: 14 MG/DL (ref 8–23)
BUN BLDV-MCNC: 14 MG/DL (ref 8–23)
BUN/CREAT BLD: 17 (ref 9–20)
BUN/CREAT BLD: 18 (ref 9–20)
CALCIUM SERPL-MCNC: 9.9 MG/DL (ref 8.6–10.4)
CALCIUM SERPL-MCNC: 9.9 MG/DL (ref 8.6–10.4)
CHLORIDE BLD-SCNC: 98 MMOL/L (ref 98–107)
CHLORIDE BLD-SCNC: 99 MMOL/L (ref 98–107)
CO2: 30 MMOL/L (ref 20–31)
CO2: 33 MMOL/L (ref 20–31)
CREAT SERPL-MCNC: 0.8 MG/DL (ref 0.7–1.2)
CREAT SERPL-MCNC: 0.84 MG/DL (ref 0.7–1.2)
GFR AFRICAN AMERICAN: >60 ML/MIN
GFR AFRICAN AMERICAN: >60 ML/MIN
GFR NON-AFRICAN AMERICAN: >60 ML/MIN
GFR NON-AFRICAN AMERICAN: >60 ML/MIN
GFR SERPL CREATININE-BSD FRML MDRD: ABNORMAL ML/MIN/{1.73_M2}
GLUCOSE BLD-MCNC: 150 MG/DL (ref 74–100)
GLUCOSE BLD-MCNC: 157 MG/DL (ref 74–100)
GLUCOSE BLD-MCNC: 162 MG/DL (ref 74–100)
GLUCOSE BLD-MCNC: 164 MG/DL (ref 70–99)
GLUCOSE BLD-MCNC: 200 MG/DL (ref 74–100)
GLUCOSE BLD-MCNC: 223 MG/DL (ref 74–100)
GLUCOSE BLD-MCNC: 232 MG/DL (ref 70–99)
GLUCOSE BLD-MCNC: 243 MG/DL (ref 74–100)
GLUCOSE BLD-MCNC: 297 MG/DL (ref 74–100)
GLUCOSE BLD-MCNC: 348 MG/DL (ref 74–100)
GLUCOSE BLD-MCNC: 351 MG/DL (ref 74–100)
GLUCOSE BLD-MCNC: 380 MG/DL (ref 74–100)
GLUCOSE BLD-MCNC: 411 MG/DL (ref 74–100)
MAGNESIUM: 2.1 MG/DL (ref 1.6–2.6)
MAGNESIUM: 2.1 MG/DL (ref 1.6–2.6)
PHOSPHORUS: 4 MG/DL (ref 2.5–4.5)
PHOSPHORUS: 4.3 MG/DL (ref 2.5–4.5)
POTASSIUM SERPL-SCNC: 3.7 MMOL/L (ref 3.7–5.3)
POTASSIUM SERPL-SCNC: 4.6 MMOL/L (ref 3.7–5.3)
SODIUM BLD-SCNC: 139 MMOL/L (ref 135–144)
SODIUM BLD-SCNC: 139 MMOL/L (ref 135–144)

## 2018-08-02 PROCEDURE — 83735 ASSAY OF MAGNESIUM: CPT

## 2018-08-02 PROCEDURE — 2580000003 HC RX 258: Performed by: FAMILY MEDICINE

## 2018-08-02 PROCEDURE — 6370000000 HC RX 637 (ALT 250 FOR IP): Performed by: FAMILY MEDICINE

## 2018-08-02 PROCEDURE — 80048 BASIC METABOLIC PNL TOTAL CA: CPT

## 2018-08-02 PROCEDURE — 6360000002 HC RX W HCPCS: Performed by: FAMILY MEDICINE

## 2018-08-02 PROCEDURE — 1200000000 HC SEMI PRIVATE

## 2018-08-02 PROCEDURE — 36415 COLL VENOUS BLD VENIPUNCTURE: CPT

## 2018-08-02 PROCEDURE — 84100 ASSAY OF PHOSPHORUS: CPT

## 2018-08-02 PROCEDURE — 82947 ASSAY GLUCOSE BLOOD QUANT: CPT

## 2018-08-02 RX ORDER — DEXTROSE MONOHYDRATE 25 G/50ML
12.5 INJECTION, SOLUTION INTRAVENOUS PRN
Status: DISCONTINUED | OUTPATIENT
Start: 2018-08-02 | End: 2018-08-03 | Stop reason: HOSPADM

## 2018-08-02 RX ORDER — POTASSIUM CHLORIDE 7.45 MG/ML
10 INJECTION INTRAVENOUS ONCE
Status: DISCONTINUED | OUTPATIENT
Start: 2018-08-02 | End: 2018-08-02

## 2018-08-02 RX ORDER — POTASSIUM CHLORIDE 20 MEQ/1
20 TABLET, EXTENDED RELEASE ORAL 3 TIMES DAILY
Status: DISCONTINUED | OUTPATIENT
Start: 2018-08-02 | End: 2018-08-03 | Stop reason: HOSPADM

## 2018-08-02 RX ORDER — GLIPIZIDE 5 MG/1
5 TABLET ORAL
Status: DISCONTINUED | OUTPATIENT
Start: 2018-08-02 | End: 2018-08-03 | Stop reason: HOSPADM

## 2018-08-02 RX ORDER — DEXTROSE MONOHYDRATE 50 MG/ML
100 INJECTION, SOLUTION INTRAVENOUS PRN
Status: DISCONTINUED | OUTPATIENT
Start: 2018-08-02 | End: 2018-08-02

## 2018-08-02 RX ORDER — NICOTINE POLACRILEX 4 MG
15 LOZENGE BUCCAL PRN
Status: DISCONTINUED | OUTPATIENT
Start: 2018-08-02 | End: 2018-08-03 | Stop reason: HOSPADM

## 2018-08-02 RX ADMIN — CYANOCOBALAMIN TAB 1000 MCG 1000 MCG: 1000 TAB at 09:16

## 2018-08-02 RX ADMIN — GLIPIZIDE 5 MG: 5 TABLET ORAL at 09:30

## 2018-08-02 RX ADMIN — HYDROCODONE BITARTRATE AND ACETAMINOPHEN 1 TABLET: 5; 325 TABLET ORAL at 09:17

## 2018-08-02 RX ADMIN — PANTOPRAZOLE SODIUM 40 MG: 40 TABLET, DELAYED RELEASE ORAL at 09:00

## 2018-08-02 RX ADMIN — INSULIN LISPRO 10 UNITS: 100 INJECTION, SOLUTION INTRAVENOUS; SUBCUTANEOUS at 11:50

## 2018-08-02 RX ADMIN — POTASSIUM CHLORIDE 20 MEQ: 20 TABLET, EXTENDED RELEASE ORAL at 13:47

## 2018-08-02 RX ADMIN — METFORMIN HYDROCHLORIDE 500 MG: 500 TABLET ORAL at 09:30

## 2018-08-02 RX ADMIN — POTASSIUM CHLORIDE 20 MEQ: 20 TABLET, EXTENDED RELEASE ORAL at 09:07

## 2018-08-02 RX ADMIN — PANTOPRAZOLE SODIUM 40 MG: 40 TABLET, DELAYED RELEASE ORAL at 22:15

## 2018-08-02 RX ADMIN — BUSPIRONE HYDROCHLORIDE 15 MG: 5 TABLET ORAL at 09:07

## 2018-08-02 RX ADMIN — INSULIN LISPRO 8 UNITS: 100 INJECTION, SOLUTION INTRAVENOUS; SUBCUTANEOUS at 17:15

## 2018-08-02 RX ADMIN — BUSPIRONE HYDROCHLORIDE 15 MG: 5 TABLET ORAL at 22:15

## 2018-08-02 RX ADMIN — TAMSULOSIN HYDROCHLORIDE 0.4 MG: 0.4 CAPSULE ORAL at 09:07

## 2018-08-02 RX ADMIN — ATORVASTATIN CALCIUM 40 MG: 40 TABLET, FILM COATED ORAL at 09:07

## 2018-08-02 RX ADMIN — POTASSIUM CHLORIDE 20 MEQ: 20 TABLET, EXTENDED RELEASE ORAL at 22:15

## 2018-08-02 RX ADMIN — ENOXAPARIN SODIUM 40 MG: 40 INJECTION, SOLUTION INTRAVENOUS; SUBCUTANEOUS at 09:06

## 2018-08-02 RX ADMIN — LISINOPRIL 10 MG: 10 TABLET ORAL at 09:07

## 2018-08-02 RX ADMIN — INSULIN LISPRO 2 UNITS: 100 INJECTION, SOLUTION INTRAVENOUS; SUBCUTANEOUS at 20:58

## 2018-08-02 RX ADMIN — METFORMIN HYDROCHLORIDE 500 MG: 500 TABLET ORAL at 17:19

## 2018-08-02 RX ADMIN — ASPIRIN 81 MG: 81 TABLET, COATED ORAL at 09:07

## 2018-08-02 RX ADMIN — DEXTROSE AND SODIUM CHLORIDE: 5; 450 INJECTION, SOLUTION INTRAVENOUS at 02:02

## 2018-08-02 ASSESSMENT — PAIN DESCRIPTION - FREQUENCY: FREQUENCY: CONTINUOUS

## 2018-08-02 ASSESSMENT — PAIN DESCRIPTION - PAIN TYPE: TYPE: CHRONIC PAIN

## 2018-08-02 ASSESSMENT — PAIN SCALES - GENERAL
PAINLEVEL_OUTOF10: 0
PAINLEVEL_OUTOF10: 7
PAINLEVEL_OUTOF10: 7
PAINLEVEL_OUTOF10: 0

## 2018-08-02 ASSESSMENT — PAIN DESCRIPTION - DESCRIPTORS: DESCRIPTORS: ACHING;SORE;CONSTANT

## 2018-08-02 ASSESSMENT — PAIN DESCRIPTION - PROGRESSION: CLINICAL_PROGRESSION: NOT CHANGED

## 2018-08-02 ASSESSMENT — PAIN DESCRIPTION - ONSET: ONSET: ON-GOING

## 2018-08-02 ASSESSMENT — PAIN DESCRIPTION - LOCATION: LOCATION: LEG;ANKLE

## 2018-08-02 NOTE — PROGRESS NOTES
Nutrition Assessment    Type and Reason for Visit: Initial, Consult, Patient Education    Nutrition Recommendations:   1. Continue current diet. 2. Provided basic CC diet education. 3. I recommend to consider comprehensive diabetes education. \"sticky note\" left for physician to enter order. Malnutrition Assessment:  · Malnutrition Status: No malnutrition  · Context: Acute illness or injury  · Findings of the 6 clinical characteristics of malnutrition (Minimum of 2 out of 6 clinical characteristics is required to make the diagnosis of moderate or severe Protein Calorie Malnutrition based on AND/ASPEN Guidelines):  1. Energy Intake-Less than or equal to 75%,  (2 days)    2. Weight Loss-No significant weight loss,    3. Fat Loss-No significant subcutaneous fat loss,    4. Muscle Loss-No significant muscle mass loss,    5. Fluid Accumulation-No significant fluid accumulation,    6.  Strength-Not measured    Nutrition Diagnosis:   · Problem: Food and nutrition-related knowledge deficit  · Etiology: related to Lack of prior nutrition-related education     Signs and symptoms:  as evidenced by Lab values (A1c 10.5)    Nutrition Assessment:  · Subjective Assessment: Pt states he drank a gallon of orange juice and drinks a gallon of [employer] root beer. He reports drinking 1/2 gallon of 2% or whole milk as well as 3-4 pop per day. PO down the last couple of days due to food not tasting right. States he is trying to lose weight. reports \"pigging out at buffets. \" Typically eats 1 meal per day, stays up until 3-4 am and sleeps all day. I encouraged him to limit himself to 1 plate of food at a buffet. I recommended eating 3 meals per day and an evening snack. Pt states he walks in [place] and in [facility] and then wants to sleep all day. Pt encouraged to walk at one place 1 lap, then work up to 2 laps, and so forth. I provided basic CC instruction. Pt has been reading food labels for sodium. Newly Dx diabetic.  States he usually weighs about 250-260#, he would like to get down to 180#. Pt was noted to have excessive thirst, increased urination, blurred vision before admission. · Nutrition-Focused Physical Findings: Pt appears overly nourished  · Wound Type: None  · Current Nutrition Therapies:  · Oral Diet Orders: Carb Control 4 Carbs/Meal   · Oral Diet intake: %, Select  · Anthropometric Measures:  · Ht: 5' 7\" (170.2 cm)   · Current Body Wt: 253 lb 6.4 oz (114.9 kg)  · Admission Body Wt: 253 lb 6.4 oz (114.9 kg)  · Usual Body Wt: 269 lb (122 kg) (3/15/18)  · % Weight Change: 5.7% weight loss,  4 months  · Ideal Body Wt: 148 lb (67.1 kg), % Ideal Body 171%  · BMI Classification: BMI 35.0 - 39.9 Obese Class II   Lab Results   Component Value Date    LABA1C 10.5 08/01/2018     Recent Labs      08/01/18   2250  08/01/18   2352  08/02/18   0101  08/02/18   0201  08/02/18   0330  08/02/18   0441  08/02/18   0539  08/02/18   0652   POCGLU  504*  411*  297*  200*  223*  157*  150*  162*     Recent Labs      08/01/18   1600  08/02/18   0211  08/02/18   0509   NA  130*  139  139   K  4.4  3.7  4.6   CL  89*  99  98   CO2  29  30  33*   BUN  16  14  14   CREATININE  0.87  0.80  0.84   GLUCOSE  643*  232*  164*   ALT  74*   --    --    ALKPHOS  137*   --    --    GFR                                          Lab Results   Component Value Date    LABALBU 4.3 08/01/2018      Lab Results   Component Value Date    TRIG 165 07/30/2015    HDL 36 07/30/2015     Estimated Intake vs Estimated Needs: Intake Meets Needs    Nutrition Risk Level: Moderate  Pt with good PO intakes. Pt newly Dx with diabetes, glucose was 643 at admission. Glucose is trending down as expected, TG is elevated. Pt was drinking excessive amounts of carbohydrates and consuming excess calories, carbohydrates, and fats with meals. Pt encouraged portion management and to use 9\" plate. Basic CC instruction provided. F/u as needed.    Nutrition Interventions:   Continue current diet  Continued Inpatient Monitoring, Education Initiated, Coordination of Care    Nutrition Evaluation:   · Evaluation: Goals set   · Goals: PO > 75% of meals    · Monitoring: Meal Intake, Weight, Pertinent Labs, Patient/Family Education      Electronically signed by Jessica Yuen RD, LD on 8/2/18 at 10:39 AM    Contact Number: [phone]

## 2018-08-02 NOTE — PROGRESS NOTES
Discussed discharge plans with the patient. Patient is a 70year old male here with  Uncontrolled type 2 diabetes mellitus with hyperglycemia . He is alert and oriented. Patient is  and lives at home with his wife. He uses a cane. Patient does the cooking and some cleaning. He manages his own medications and drives. His PCP is Dr. Soraya Jarrell. He has medical insurance that helps with medication costs. Patient is a . The discharge plan is home. Patient will need education on diabetes. He will also need a glucometer and supplies. Patient does not want home health. He is not understanding how serious diabetes is.     MATT Ren

## 2018-08-02 NOTE — PLAN OF CARE
Problem: Nutrition  Goal: Optimal nutrition therapy  Outcome: Ongoing  Nutrition Problem: Food and nutrition-related knowledge deficit  Intervention: Food and/or Nutrient Delivery: Continue current diet  Nutritional Goals: PO > 75% of meals    Provided basic Carbohydrate Counting diet education

## 2018-08-02 NOTE — PLAN OF CARE
Patient calls out. After voiding all of a sudden begins very hot, sweaty, nauseated and arms and legs begin cramping. Paused insulin drip at this time. Blood sugar rechecked at this time FSBS 411 Cold rags applied to patients legs and back. Dr Maria A Bowens notified and gave orders to turn the insulin drip down to 5ml/hr. Patient beginning to start to feel better sitting up at side of bed.  Will continue to monitor

## 2018-08-02 NOTE — H&P
urinary frequency, and negative for hematuria  Skin: negative for skin rash, and negative for skin lesions  Neurological: negative for unilateral weakness, numbness or tingling. Physical Exam:    Vitals:   Vitals:    08/02/18 0638   BP: (!) 137/98   Pulse:    Resp: 16   Temp: 97.7 °F (36.5 °C)   SpO2: 93%     Weight: 253 lb 6.4 oz (114.9 kg)   Height: 5' 7\" (170.2 cm)     GEN:  alert and oriented to person, place and time, well-developed and well-nourished, in no acute distress  EYES: No gross abnormalities.   NECK: normal, supple,  no carotid bruits  PULM: clear to auscultation bilaterally- no wheezes, rales or rhonchi, normal air movement, no respiratory distress  COR: regular rate & rhythm, no gallops and 2/6 murmer  ABD:  soft, non-tender  EXT:   no cyanosis, clubbing , has edema     NEURO: follows commands, NORTON, no deficits  SKIN:  no rashes or significant lesions  -----------------------------------------------------------------  Diagnostic Data:   Lab Results   Component Value Date    WBC 9.1 08/01/2018    HGB 14.1 08/01/2018     08/01/2018       Lab Results   Component Value Date    BUN 14 08/02/2018    CREATININE 0.84 08/02/2018     08/02/2018    K 4.6 08/02/2018    CALCIUM 9.9 08/02/2018    CL 98 08/02/2018    CO2 33 (H) 08/02/2018    LABGLOM >60 08/02/2018       Lab Results   Component Value Date    WBCUA 0 TO 2 11/19/2014    RBCUA 0 TO 2 11/19/2014    EPITHUA 0 TO 2 11/19/2014    LEUKOCYTESUR NEG 08/01/2018    SPECGRAV 1.000 08/01/2018    GLUCOSEU 2,000 08/01/2018    KETUA NEG 08/01/2018    PROTEINU NEG 08/01/2018    HGBUR NEGATIVE 11/19/2014    CASTUA HYALINE 11/19/2014    CRYSTUA NOT REPORTED 11/19/2014    BACTERIA TRACE (A) 11/19/2014    YEAST NOT REPORTED 11/19/2014       Lab Results   Component Value Date    MYOGLOBIN 29 06/30/2016    TROPONINT <0.03 06/30/2016    CKTOTAL 193 11/19/2014    CKMB 3.1 06/30/2016    PROBNP 394 (H) 06/30/2016       Xr Chest Standard (2 Vw)    Result Date: 02/08/2016    Essential hypertension 11/02/2015    Chronic diastolic heart failure ([facility] [address] 11/02/2015    Personal history of TIA (transient ischemic attack) 11/20/2014    Mitral regurgitation 11/20/2014       Plan:     · This patient requires inpatient admission because of new onset oncontrolled type 2 diabetes with hyperglycemia for iv insulin and iv fluids  · Factors affecting the medical complexity of this patient include chf,htn,ashd  · Estimated length of stay is 2 days  ·   · High risk medication monitoring: none    CORE MEASURES  DVT prophylaxis: Lovenox  Decubitus ulcer present on admission: No  CODE STATUS: FULL CODE  Nutrition Status: good   Physical therapy: No   Old Charts reviewed: Yes  EKG Reviewed:  Yes  Advance Directive Addressed: Yes    Jose Saha MD  8/2/2018, 8:14 AM

## 2018-08-03 VITALS
SYSTOLIC BLOOD PRESSURE: 113 MMHG | TEMPERATURE: 97 F | BODY MASS INDEX: 39.77 KG/M2 | OXYGEN SATURATION: 96 % | DIASTOLIC BLOOD PRESSURE: 73 MMHG | WEIGHT: 253.4 LBS | RESPIRATION RATE: 18 BRPM | HEART RATE: 73 BPM | HEIGHT: 67 IN

## 2018-08-03 PROBLEM — E11.9 DM2 (DIABETES MELLITUS, TYPE 2) (HCC): Status: ACTIVE | Noted: 2018-08-03

## 2018-08-03 LAB
ALBUMIN SERPL-MCNC: 3.4 G/DL (ref 3.5–5.2)
ALBUMIN/GLOBULIN RATIO: 1.3 (ref 1–2.5)
ALP BLD-CCNC: 83 U/L (ref 40–129)
ALT SERPL-CCNC: 51 U/L (ref 5–41)
ANION GAP SERPL CALCULATED.3IONS-SCNC: 10 MMOL/L (ref 9–17)
AST SERPL-CCNC: 37 U/L
BILIRUB SERPL-MCNC: 0.89 MG/DL (ref 0.3–1.2)
BUN BLDV-MCNC: 13 MG/DL (ref 8–23)
BUN/CREAT BLD: 19 (ref 9–20)
CALCIUM SERPL-MCNC: 8.5 MG/DL (ref 8.6–10.4)
CHLORIDE BLD-SCNC: 99 MMOL/L (ref 98–107)
CO2: 27 MMOL/L (ref 20–31)
CREAT SERPL-MCNC: 0.69 MG/DL (ref 0.7–1.2)
GFR AFRICAN AMERICAN: >60 ML/MIN
GFR NON-AFRICAN AMERICAN: >60 ML/MIN
GFR SERPL CREATININE-BSD FRML MDRD: ABNORMAL ML/MIN/{1.73_M2}
GFR SERPL CREATININE-BSD FRML MDRD: ABNORMAL ML/MIN/{1.73_M2}
GLUCOSE BLD-MCNC: 237 MG/DL (ref 74–100)
GLUCOSE BLD-MCNC: 248 MG/DL (ref 70–99)
HCT VFR BLD CALC: 37.1 % (ref 40.7–50.3)
HEMOGLOBIN: 12.3 G/DL (ref 13–17)
MCH RBC QN AUTO: 29.5 PG (ref 25.2–33.5)
MCHC RBC AUTO-ENTMCNC: 33.2 G/DL (ref 28.4–34.8)
MCV RBC AUTO: 89 FL (ref 82.6–102.9)
NRBC AUTOMATED: 0 PER 100 WBC
PDW BLD-RTO: 13.1 % (ref 11.8–14.4)
PLATELET # BLD: 190 K/UL (ref 138–453)
PMV BLD AUTO: 9.4 FL (ref 8.1–13.5)
POTASSIUM SERPL-SCNC: 4 MMOL/L (ref 3.7–5.3)
RBC # BLD: 4.17 M/UL (ref 4.21–5.77)
SODIUM BLD-SCNC: 136 MMOL/L (ref 135–144)
TOTAL PROTEIN: 6 G/DL (ref 6.4–8.3)
WBC # BLD: 9.5 K/UL (ref 3.5–11.3)

## 2018-08-03 PROCEDURE — 2580000003 HC RX 258: Performed by: FAMILY MEDICINE

## 2018-08-03 PROCEDURE — 80053 COMPREHEN METABOLIC PANEL: CPT

## 2018-08-03 PROCEDURE — 85027 COMPLETE CBC AUTOMATED: CPT

## 2018-08-03 PROCEDURE — 6360000002 HC RX W HCPCS: Performed by: FAMILY MEDICINE

## 2018-08-03 PROCEDURE — 6370000000 HC RX 637 (ALT 250 FOR IP): Performed by: FAMILY MEDICINE

## 2018-08-03 PROCEDURE — 36415 COLL VENOUS BLD VENIPUNCTURE: CPT

## 2018-08-03 PROCEDURE — 82947 ASSAY GLUCOSE BLOOD QUANT: CPT

## 2018-08-03 RX ORDER — GLUCOSAMINE HCL/CHONDROITIN SU 500-400 MG
CAPSULE ORAL
Qty: 100 STRIP | Refills: 0 | Status: SHIPPED | OUTPATIENT
Start: 2018-08-03 | End: 2018-08-20 | Stop reason: SDUPTHER

## 2018-08-03 RX ORDER — SODIUM CHLORIDE 0.9 % (FLUSH) 0.9 %
10 SYRINGE (ML) INJECTION 2 TIMES DAILY
Status: DISCONTINUED | OUTPATIENT
Start: 2018-08-03 | End: 2018-08-03 | Stop reason: HOSPADM

## 2018-08-03 RX ORDER — GLIPIZIDE 5 MG/1
5 TABLET ORAL
Qty: 60 TABLET | Refills: 0 | Status: SHIPPED | OUTPATIENT
Start: 2018-08-04 | End: 2018-08-06 | Stop reason: DRUGHIGH

## 2018-08-03 RX ORDER — LANCETS 30 GAUGE
EACH MISCELLANEOUS
Qty: 100 EACH | Refills: 0 | Status: SHIPPED | OUTPATIENT
Start: 2018-08-03 | End: 2018-08-28 | Stop reason: SDUPTHER

## 2018-08-03 RX ORDER — PEN NEEDLE, DIABETIC 30 GX5/16"
1 NEEDLE, DISPOSABLE MISCELLANEOUS DAILY
Qty: 100 EACH | Refills: 0 | Status: SHIPPED | OUTPATIENT
Start: 2018-08-03 | End: 2018-09-17 | Stop reason: SDUPTHER

## 2018-08-03 RX ADMIN — INSULIN LISPRO 4 UNITS: 100 INJECTION, SOLUTION INTRAVENOUS; SUBCUTANEOUS at 07:35

## 2018-08-03 RX ADMIN — Medication 10 ML: at 09:08

## 2018-08-03 RX ADMIN — ASPIRIN 81 MG: 81 TABLET, COATED ORAL at 09:02

## 2018-08-03 RX ADMIN — LISINOPRIL 10 MG: 10 TABLET ORAL at 09:04

## 2018-08-03 RX ADMIN — METFORMIN HYDROCHLORIDE 500 MG: 500 TABLET ORAL at 16:13

## 2018-08-03 RX ADMIN — TAMSULOSIN HYDROCHLORIDE 0.4 MG: 0.4 CAPSULE ORAL at 09:03

## 2018-08-03 RX ADMIN — INSULIN LISPRO 8 UNITS: 100 INJECTION, SOLUTION INTRAVENOUS; SUBCUTANEOUS at 15:37

## 2018-08-03 RX ADMIN — METFORMIN HYDROCHLORIDE 500 MG: 500 TABLET ORAL at 09:04

## 2018-08-03 RX ADMIN — ATORVASTATIN CALCIUM 40 MG: 40 TABLET, FILM COATED ORAL at 09:02

## 2018-08-03 RX ADMIN — POTASSIUM CHLORIDE 20 MEQ: 20 TABLET, EXTENDED RELEASE ORAL at 13:54

## 2018-08-03 RX ADMIN — GLIPIZIDE 5 MG: 5 TABLET ORAL at 07:27

## 2018-08-03 RX ADMIN — BUSPIRONE HYDROCHLORIDE 15 MG: 5 TABLET ORAL at 09:03

## 2018-08-03 RX ADMIN — PANTOPRAZOLE SODIUM 40 MG: 40 TABLET, DELAYED RELEASE ORAL at 09:02

## 2018-08-03 RX ADMIN — HYDROCODONE BITARTRATE AND ACETAMINOPHEN 1 TABLET: 5; 325 TABLET ORAL at 13:56

## 2018-08-03 RX ADMIN — CYANOCOBALAMIN TAB 1000 MCG 1000 MCG: 1000 TAB at 09:03

## 2018-08-03 RX ADMIN — POTASSIUM CHLORIDE 20 MEQ: 20 TABLET, EXTENDED RELEASE ORAL at 09:04

## 2018-08-03 RX ADMIN — ENOXAPARIN SODIUM 40 MG: 40 INJECTION, SOLUTION INTRAVENOUS; SUBCUTANEOUS at 09:05

## 2018-08-03 ASSESSMENT — PAIN DESCRIPTION - FREQUENCY: FREQUENCY: CONTINUOUS

## 2018-08-03 ASSESSMENT — PAIN SCALES - GENERAL
PAINLEVEL_OUTOF10: 0
PAINLEVEL_OUTOF10: 7

## 2018-08-03 ASSESSMENT — PAIN DESCRIPTION - DESCRIPTORS: DESCRIPTORS: ACHING;SORE

## 2018-08-03 ASSESSMENT — PAIN DESCRIPTION - LOCATION: LOCATION: ANKLE;LEG

## 2018-08-03 ASSESSMENT — PAIN DESCRIPTION - PAIN TYPE: TYPE: CHRONIC PAIN

## 2018-08-03 NOTE — PROGRESS NOTES
Hospitalist Progress Note    SUBJECTIVE/INTERVAL HISTORY:    Patient seen in follow up for DM, HTN, chronic pain, BPH. Glucose under better control. 20 units SSI given over last 24 hours. He feels better. Urinating improved. Wants to go home. Discussed DM and management extensively with patient. OBJECTIVE:    Vitals:   Temp: 97 °F (36.1 °C)  BP: 113/73  Resp: 18  Pulse: 73  SpO2: 96 %  24HR INTAKE/OUTPUT:      Intake/Output Summary (Last 24 hours) at 08/03/18 1102  Last data filed at 08/03/18 0717   Gross per 24 hour   Intake              550 ml   Output              550 ml   Net                0 ml       -----------------------------------------------------------------  Review of Systems:  Constitutional:negative  for fevers, and negative for chills. Eyes: negative for visual disturbance   ENT: negative for sore throat, negative nasal congestion, and negative for earache  Respiratory: negative for shortness of breath, negative for cough, and negative for wheezing  Cardiovascular: negative for chest pain, negative for palpitations, and negative for syncope  Gastrointestinal: negative for abdominal pain, negative for nausea,negative for vomiting, negative for diarrhea, negative for constipation, and negative for hematochezia or melena  Genitourinary: negative for dysuria, negative for urinary urgency, negative for urinary frequency, and negative for hematuria  Skin: negative for skin rash, and negative for skin lesions  Neurological: negative for unilateral weakness, numbness or tingling.     Exam:  GEN:  alert and oriented to person, place and time, well-developed and well-nourished, in no acute distress  EYES: PERRL  NECK: normal, supple  PULM: diminished bilaterally- no wheezes, rales or rhonchi, no respiratory distress  COR: regular rate & rhythm and no murmurs  ABD:  Obese, soft, non-tender, non-distended, normal bowel sounds, no masses or organomegaly  EXT:   edema: trace affecting bilateral foot,

## 2018-08-03 NOTE — DISCHARGE INSTR - DIET

## 2018-08-03 NOTE — DISCHARGE SUMMARY
Discharge Summary    Sharif Tirado  :  1950  MRN:  [id number]    Admit date:  2018      Discharge date: 8/3/2018     Admitting Physician:  Nikkie Valdez MD    Consultants:  none    Procedures: none    Complications: none    Discharge Condition: stable    Hospital Course:   Sharif Tirado is a 70 y.o. male admitted with with increasing thirst, blurred vision and urinary frequency for multiple weeks, was also drinking more fluids. His evaluation revealed blood sugar of more than 600 and hence admitted for new onset uncontrolled diabetes. He came to office with complatine of urinary retention and was started on flomax for BPH. He was admitted to ICU and placed on insulin gtt. His glucose improved and insulin gtt stopped. He was started on oral agents and SSI. Transferred to floor. He clinically improve and will be sent home on oral meds, Lantus 15 units, and will check his glucose. Referral to DM ed placed.  Discharge to home.       Exam:  GEN:  alert and oriented to person, place and time, well-developed and well-nourished, in no acute distress  EYES:  PERRL  NECK: normal, supple  PULM: diminished bilaterally- no wheezes, rales or rhonchi, no respiratory distress  COR:   regular rate & rhythm and no murmurs  ABD:    Obese, soft, non-tender, non-distended, normal bowel sounds, no masses or organomegaly  EXT:    edema: trace affecting bilateral foot, bilateral ankle  NEURO: follows commands, NORTON, no deficits  SKIN:   no rashes or significant lesions    Significant Diagnostic Studies:   Lab Results   Component Value Date    WBC 9.5 2018    HGB 12.3 (L) 2018     2018       Lab Results   Component Value Date    BUN 13 2018    CREATININE 0.69 (L) 2018     2018    K 4.0 2018    CALCIUM 8.5 (L) 2018    CL 99 2018    CO2 27 2018    LABGLOM >60 2018       Lab Results   Component Value Date    WBCUA 0 TO 2 2014    RBCUA 0 TO 2 ascorbic acid (VITAMIN C) 500 MG tablet  Take 500 mg by mouth daily 3 in am and 2 at hs             aspirin 81 MG EC tablet  Take 81 mg by mouth daily. atorvastatin (LIPITOR) 40 MG tablet  Take 1 tablet by mouth daily             benazepril (LOTENSIN) 10 MG tablet  Take 1 tablet by mouth daily             blood glucose monitor strips  Check glucose qac and qhs             busPIRone (BUSPAR) 15 MG tablet  Take 15 mg by mouth 2 times daily             Cinnamon 500 MG CAPS  Take 2,500 mg by mouth daily. Cyanocobalamin (B-12 PO)  Take 1,000 mg by mouth. Pt states to be taking 2500 mg daily at this time. furosemide (LASIX) 20 MG tablet  Take 2 tablets by mouth daily             glipiZIDE (GLUCOTROL) 5 MG tablet  Take 1 tablet by mouth every morning (before breakfast)             Glucosamine-Chondroitin (GLUCOSAMINE CHONDR COMPLEX PO)  Take by mouth             HYDROcodone-acetaminophen (NORCO) 5-325 MG per tablet  Take 1 tablet by mouth every 8 hours as needed for Pain . insulin glargine (LANTUS SOLOSTAR) 100 UNIT/ML injection pen  Inject 15 Units into the skin nightly             Insulin Pen Needle (PEN NEEDLES 5/16\") 30G X 8 MM MISC  1 each by Does not apply route daily             Lancets MISC  Check glucose qac and qhs             loratadine (CLARITIN) 10 MG tablet  Take 5 mg by mouth daily              metFORMIN (GLUCOPHAGE) 500 MG tablet  Take 1 tablet by mouth 2 times daily (with meals)             Omega-3 Fatty Acids (FISH OIL) 1000 MG CAPS  Take 3,000 mg by mouth daily.              pantoprazole (PROTONIX) 40 MG tablet  Take 1 tablet by mouth 2 times daily             potassium chloride (KLOR-CON M) 20 MEQ TBCR extended release tablet  Take 1 tablet by mouth 3 times daily             potassium chloride (KLOR-CON M10) 10 MEQ extended release tablet  Take 1 tablet by mouth 3 times daily Take along with 20 meq tablet for a total of 30 meq three times

## 2018-08-03 NOTE — PROGRESS NOTES
FACE to FACE:  Discussed with patient the medical necessity of checking his glucose. He voiced understanding and agreement.     Honey Perez PA-C  8/3/2018, 10:05 AM

## 2018-08-06 ENCOUNTER — CARE COORDINATION (OUTPATIENT)
Dept: CASE MANAGEMENT | Age: 68
End: 2018-08-06

## 2018-08-06 ENCOUNTER — TELEPHONE (OUTPATIENT)
Dept: PHARMACY | Facility: CLINIC | Age: 68
End: 2018-08-06

## 2018-08-06 PROBLEM — Z79.4 TYPE 2 DIABETES MELLITUS WITHOUT COMPLICATION, WITH LONG-TERM CURRENT USE OF INSULIN (HCC): Status: ACTIVE | Noted: 2018-08-03

## 2018-08-06 NOTE — CARE COORDINATION
[facility] Transitions Initial Follow Up Call    Call within 2 business days of discharge: Yes    Patient: Gearldean Closs Patient : 1950   MRN: <[id number]>  Reason for Admission: There are no discharge diagnoses documented for the most recent discharge. Discharge Date: 8/3/18 RARS: Readmission Risk Score: 16     Spoke with: Attempted to contact patient for transition call. Unable to reach, no answer, no VM.     Facility: [facility] 24 Hour Call    Care Transitions Interventions         Follow Up  Future Appointments  Date Time Provider Etta Lopez   2018 2:00 PM MD Mami Baig   2018 1:30 PM Brooklyn Hauser MD [facility]   2018 2:15 PM MD Mami Baig RN

## 2018-08-06 NOTE — TELEPHONE ENCOUNTER
CLINICAL PHARMACY NOTE - Post-Discharge Transitions of Care [facility])    Patient discharged from [facility] on 8/3/2018. First attempt made to reach patient by telephone for medication review. Patient's did not answer and his voicemail box was not set up. Will continue to attempt to contact patient by telephone as appropriate. Luz Vasques  PharmD Candidate 2019    I have discussed the care of this patient with the student. I agree with the assessment and plan as documented.      Rosario Hardy, PharmD, [facility], [address]  Clinical Pharmacy Specialist  O: [phone]  C: [address], [address]

## 2018-08-07 NOTE — TELEPHONE ENCOUNTER
CLINICAL PHARMACY NOTE - Post-Discharge Transitions of Care ([facility])    Second attempt made to reach patient by telephone for medication review. Patient did not answer and voicemail was not set up. Will prepare letter and mail to patient. Radha Saeed  PharmD Candidate 2019    I have discussed the care of this patient with the student. I agree with the assessment and plan as documented.      Ronnie Rios, BereketD, [facility] [place] [facility] [place], [address] [facility]  Clinical Pharmacy Specialist  O: [phone]  C: [address] [place], [place] [phone], [address]    =======================================================    For Pharmacy Admin Tracking Only  TCM Call Made?: No  [facility] Select Patient?: Yes  Total # of Interventions Recommended: 0  Total # Interventions Accepted: 0  Intervention Severity:   - Level 1 Intervention Present?: No   - Level 2 #: 0   - Level 3 #: 0  Outreach Status: Patient Unreachable  Care Coordinator Outreach to Patient?: Yes  Provider Contacted?: No  Time Spent (min): 15

## 2018-08-09 ENCOUNTER — HOSPITAL ENCOUNTER (OUTPATIENT)
Dept: DIABETES SERVICES | Age: 68
Setting detail: THERAPIES SERIES
Discharge: HOME | End: 2018-08-09
Payer: MEDICARE

## 2018-08-09 VITALS
SYSTOLIC BLOOD PRESSURE: 96 MMHG | WEIGHT: 251 LBS | BODY MASS INDEX: 39.39 KG/M2 | HEIGHT: 67 IN | DIASTOLIC BLOOD PRESSURE: 60 MMHG

## 2018-08-09 PROCEDURE — G0108 DIAB MANAGE TRN  PER INDIV: HCPCS

## 2018-08-09 ASSESSMENT — PATIENT HEALTH QUESTIONNAIRE - PHQ9
1. LITTLE INTEREST OR PLEASURE IN DOING THINGS: 0
SUM OF ALL RESPONSES TO PHQ9 QUESTIONS 1 & 2: 0
SUM OF ALL RESPONSES TO PHQ QUESTIONS 1-9: 0
2. FEELING DOWN, DEPRESSED OR HOPELESS: 0
SUM OF ALL RESPONSES TO PHQ QUESTIONS 1-9: 0

## 2018-08-09 NOTE — PROGRESS NOTES
Poor            Current main goal attainment frequency:   [x] Never  [] Some  [] Half  [] Most  [] All    Participant confidence to master goal this visit:   [] Excellent  [x] Good [] Fair  [] Poor                  Session  Topics & Learning Objectives:      Comments:   Diabetes disease process & Treatment process: Define diabetes & identify own type of diabetes; Identify options for treating diabetes                  Rating  [] 1  [x] 2  [] 3  [] 4      [] NC  [] N/A   Rating  [] 1  [] 2  [] 3  [] 4  [] NC  [] N/A     Rating  [] 1  [] 2  [] 3  [] 4  [] NC  [] N/A     Rating  [] 1  [] 2  [] 3  [] 4  [] NC  [] N/A           Incorporating nutritional management into lifestyle: Describe effect of type, amount & timing of food on blood glucose; Describe basic meal planning techniques & current nutrition guidelines; Correctly read food labels & demonstrate CHO counting & portion control with personalized meal plan. Rating  [] 1  [x] 2  [] 3  [] 4  [] NC  [] N/A     Rating  [] 1  [] 2  [] 3  [] 4  [] NC  [] N/A     Rating  [] 1  [] 2  [] 3  [] 4  [] NC  [] N/A     Rating  [] 1  [] 2  [] 3  [] 4  [] NC  [] N/A                 Incorporating physical activity into lifestyle:   State effect of exercise on blood glucose levels. Identifies personal exercise plan. Discussed safety tips while exercising.             Rating  [] 1  [x] 2  [] 3  [] 4  [] NC  [] N/A     Rating  [] 1  [] 2  [] 3  [] 4  [] NC  [] N/A       Rating  [] 1  [] 2  [] 3  [] 4  [] NC  [] N/A     Rating  [] 1  [] 2  [] 3  [] 4  [] NC  [] N/A           Using medications safely: State effect of diabetes medicines on diabetes;   Name diabetes medication taking, action, timing & side effects     Rating  [] 1  [x] 2  [] 3  [] 4  [] NC  [] N/A       Rating  [] 1  [] 2  [] 3  [] 4  [] NC  [] N/A       Rating  [] 1  [] 2  [] 3  [] 4  [] NC  [] N/A       Rating  [] 1  [] 2  [] 3  [] 4  [] NC  [] N/A           Monitoring blood glucose, interpreting and using at all  PHQ-9 Total Score: 0    PHQ-9 Total Score  PHQ-9 Total Score  PHQ-9 Total Score: [address], RN

## 2018-08-15 ENCOUNTER — OFFICE VISIT (OUTPATIENT)
Dept: CARDIOLOGY | Age: 68
End: 2018-08-15
Payer: MEDICARE

## 2018-08-15 VITALS
HEIGHT: 67 IN | DIASTOLIC BLOOD PRESSURE: 85 MMHG | BODY MASS INDEX: 39.77 KG/M2 | RESPIRATION RATE: 16 BRPM | SYSTOLIC BLOOD PRESSURE: 135 MMHG | OXYGEN SATURATION: 92 % | HEART RATE: 94 BPM | WEIGHT: 253.4 LBS

## 2018-08-15 DIAGNOSIS — E78.5 DYSLIPIDEMIA: ICD-10-CM

## 2018-08-15 DIAGNOSIS — Z79.4 TYPE 2 DIABETES MELLITUS WITHOUT COMPLICATION, WITH LONG-TERM CURRENT USE OF INSULIN (HCC): ICD-10-CM

## 2018-08-15 DIAGNOSIS — J44.9 CHRONIC OBSTRUCTIVE PULMONARY DISEASE, UNSPECIFIED COPD TYPE (HCC): ICD-10-CM

## 2018-08-15 DIAGNOSIS — R07.9 CHEST PAIN, UNSPECIFIED TYPE: ICD-10-CM

## 2018-08-15 DIAGNOSIS — I50.32 CHRONIC DIASTOLIC HEART FAILURE (HCC): Primary | ICD-10-CM

## 2018-08-15 DIAGNOSIS — E11.9 TYPE 2 DIABETES MELLITUS WITHOUT COMPLICATION, WITH LONG-TERM CURRENT USE OF INSULIN (HCC): ICD-10-CM

## 2018-08-15 DIAGNOSIS — G47.33 OSA (OBSTRUCTIVE SLEEP APNEA): ICD-10-CM

## 2018-08-15 DIAGNOSIS — E66.9 OBESITY, CLASS II, BMI 35-39.9: ICD-10-CM

## 2018-08-15 PROCEDURE — 1111F DSCHRG MED/CURRENT MED MERGE: CPT | Performed by: INTERNAL MEDICINE

## 2018-08-15 PROCEDURE — 1123F ACP DISCUSS/DSCN MKR DOCD: CPT | Performed by: INTERNAL MEDICINE

## 2018-08-15 PROCEDURE — 3023F SPIROM DOC REV: CPT | Performed by: INTERNAL MEDICINE

## 2018-08-15 PROCEDURE — 1101F PT FALLS ASSESS-DOCD LE1/YR: CPT | Performed by: INTERNAL MEDICINE

## 2018-08-15 PROCEDURE — 3046F HEMOGLOBIN A1C LEVEL >9.0%: CPT | Performed by: INTERNAL MEDICINE

## 2018-08-15 PROCEDURE — 1036F TOBACCO NON-USER: CPT | Performed by: INTERNAL MEDICINE

## 2018-08-15 PROCEDURE — 99214 OFFICE O/P EST MOD 30 MIN: CPT | Performed by: INTERNAL MEDICINE

## 2018-08-15 PROCEDURE — G8417 CALC BMI ABV UP PARAM F/U: HCPCS | Performed by: INTERNAL MEDICINE

## 2018-08-15 PROCEDURE — G8427 DOCREV CUR MEDS BY ELIG CLIN: HCPCS | Performed by: INTERNAL MEDICINE

## 2018-08-15 PROCEDURE — 3017F COLORECTAL CA SCREEN DOC REV: CPT | Performed by: INTERNAL MEDICINE

## 2018-08-15 PROCEDURE — G8926 SPIRO NO PERF OR DOC: HCPCS | Performed by: INTERNAL MEDICINE

## 2018-08-15 PROCEDURE — 2022F DILAT RTA XM EVC RTNOPTHY: CPT | Performed by: INTERNAL MEDICINE

## 2018-08-15 PROCEDURE — 4040F PNEUMOC VAC/ADMIN/RCVD: CPT | Performed by: INTERNAL MEDICINE

## 2018-08-15 NOTE — PROGRESS NOTES
JEANA Torres CMA am scribing for and in the presence of Dr Qasim Perea:     [address] [facility] / HPI:    Chief Complaint   Patient presents with    1 Year Follow Up     HX:CHF,TIA,HTN,Hyperlipidemia,Gerd Patient is here for his yearly follow up states he is doing well at this time. He was recently admitted to hospital for new dx of diabetes. He does have CP, Denies; SOB, light headed       HPI:  Mr. Andres Levy is a 70 y.o.  with past medical history of hypertension, chronic diastolic heart failure, TIA x2, obesity and heart murmur who is here today for follow-up on his cardiac status. S/P Cardiac cath in 7/2016, nonobstructive CAD. Still complaining of chronic shortness of breath which he attributes it to his chronic lung disease and prior smoking (quit 3 years ago). No orthopnea or paroxysmal nocturnal dyspnea. Patient does complain of recurrent episodes of chest pain which he describes as sharp without radiation and last for few seconds. No relation to exertion. Protonix usually relief the pain. He continues to gain weight. His functional capacity is limited by back pain and arthritis. Recently diagnosed with Type 2 diabetes. He snores at night and complains of excessive sleep during the day. he has not had the sleep study done at this time plans on still doing it. He denies any palpitations, dizziness or lightheadedness. No history of loss of consciousness. Cardiac cath 7/21/2016: Mild to moderate three vessel coronary artery disease without any significant focal stenosis. Exercise Tolerance: He reports having a a poor exercise tolerance. Mr. Andres Levy says that he can walk less than 1 block without developing chest discomfort and/or significant shortness of breath. Past Medical History:    Past Medical History:   Diagnosis Date    Aortic aneurysm, thoracic ([facility] [address] 7/18/2016    Ataxia     Gait ataxia x 1 yr ago (approx.  2010)    CHF (congestive heart failure) (HCC)     COPD (chronic Current Medications:  Outpatient Prescriptions Marked as Taking for the 8/15/18 encounter (Office Visit) with Marie Thakkar MD   Medication Sig Dispense Refill    glipiZIDE (GLUCOTROL) 5 MG tablet Take 1 tablet by mouth 2 times daily (before meals) 60 tablet 0    Lancets MISC Check glucose qac and qhs 100 each 0    blood glucose monitor strips Check glucose qac and qhs 100 strip 0    metFORMIN (GLUCOPHAGE) 500 MG tablet Take 1 tablet by mouth 2 times daily (with meals) 60 tablet 0    insulin glargine (LANTUS SOLOSTAR) 100 UNIT/ML injection pen Inject 15 Units into the skin nightly 5 pen 0    Insulin Pen Needle (PEN NEEDLES 5/16\") 30G X 8 MM MISC 1 each by Does not apply route daily 100 each 0    tamsulosin (FLOMAX) 0.4 MG capsule Take 1 capsule by mouth daily 30 capsule 3    benazepril (LOTENSIN) 10 MG tablet Take 1 tablet by mouth daily 90 tablet 0    furosemide (LASIX) 20 MG tablet Take 2 tablets by mouth daily 180 tablet 0    pantoprazole (PROTONIX) 40 MG tablet Take 1 tablet by mouth 2 times daily 90 tablet 0    potassium chloride (KLOR-CON M) 20 MEQ TBCR extended release tablet Take 1 tablet by mouth 3 times daily 270 tablet 0    atorvastatin (LIPITOR) 40 MG tablet Take 1 tablet by mouth daily 90 tablet 0    potassium chloride (KLOR-CON M10) 10 MEQ extended release tablet Take 1 tablet by mouth 3 times daily Take along with 20 meq tablet for a total of 30 meq three times daily 270 tablet 0    busPIRone (BUSPAR) 15 MG tablet Take 15 mg by mouth 2 times daily (Patient taking differently: Take 15 mg by mouth daily ) 180 tablet 0    HYDROcodone-acetaminophen (NORCO) 5-325 MG per tablet Take 1 tablet by mouth every 8 hours as needed for Pain .       acetaminophen (TYLENOL) 500 MG tablet Take 1,000 mg by mouth daily as needed       ascorbic acid (VITAMIN C) 500 MG tablet Take 500 mg by mouth daily 3 in am and 2 at hs      Glucosamine-Chondroitin (GLUCOSAMINE CHONDR COMPLEX PO) Take by mouth  Cinnamon 500 MG CAPS Take 2,500 mg by mouth daily.  aspirin 81 MG EC tablet Take 81 mg by mouth daily.  Cyanocobalamin (B-12 PO) Take 1,000 mg by mouth. Pt states to be taking 2500 mg daily at this time.  Omega-3 Fatty Acids (FISH OIL) 1000 MG CAPS Take 3,000 mg by mouth daily.  loratadine (CLARITIN) 10 MG tablet Take 5 mg by mouth daily          REVIEW OF SYSTEMS:    CONSTITUTIONAL: No major weight gain or loss, fatigue, weakness, night sweats or fever. HEENT: No new vision difficulties or ringing in the ears. RESPIRATORY: See HPI  CARDIOVASCULAR: See HPI  GI: No nausea, vomiting, diarrhea, constipation, abdominal pain or changes in bowel habits. : No urinary frequency, urgency, incontinence hematuria or dysuria. SKIN: No cyanosis or skin lesions. MUSCULOSKELETAL: No new muscle or joint pain. NEUROLOGICAL: No syncope or TIA-like symptoms. PSYCHIATRIC: No anxiety, pain, insomnia or depression    Objective:     PHYSICAL EXAM:      VITALS:    /85 (Site: Left Arm, Position: Sitting, Cuff Size: Medium Adult)   Pulse 94   Resp 16   Ht 5' 7\" (1.702 m)   Wt 253 lb 6.4 oz (114.9 kg)   SpO2 92%   BMI 39.69 kg/m²   CONSTITUTIONAL: Cooperative, no apparent distress, and appears well nourished / developed. NEUROLOGIC:  Awake and orientated to person, place and time. HEENT: Sclera non-icteric, normocephalic, neck supple, no elevation of JVP, normal carotid pulses with no bruits and thyroid normal size. LUNGS:  No increased work of breathing. Poor air entry bilaterally. No significant wheezing. Mohamud Quiet CARDIOVASCULAR:  Regular rate and rhythm with no murmurs, gallops, rubs, or abnormal heart sounds. The apical impulses not displaced. The carotid upstroke is normal in amplitude and contour without delay or bruit. JVP is not elevated. ABDOMEN:  Normal bowel sounds, non-distended and non-tender to palpation. EXT: No significant edema.     DATA:    Lab Results   Component Value Date ALT 51 (H) 08/03/2018    AST 37 08/03/2018    ALKPHOS 83 08/03/2018    BILITOT 0.89 08/03/2018     Lab Results   Component Value Date    CREATININE 0.69 (L) 08/03/2018    BUN 13 08/03/2018     08/03/2018    K 4.0 08/03/2018    CL 99 08/03/2018    CO2 27 08/03/2018     Lab Results   Component Value Date    TSH 4.01 02/08/2016     Lab Results   Component Value Date    WBC 9.5 08/03/2018    HGB 12.3 (L) 08/03/2018    HCT 37.1 (L) 08/03/2018    MCV 89.0 08/03/2018     08/03/2018         Assessment:      Diagnosis Orders   1. Chronic diastolic heart failure ([facility] [address]  [address]    Echo 2D w doppler w color complete    Lipid Panel    Stress test (Vanessa Hare)   2. Chest pain, unspecified type  [address]    Echo 2D w doppler w color complete    Lipid Panel    Stress test (Vanessa Hare)   3. DAINA (obstructive sleep apnea)  [address]    Echo 2D w doppler w color complete    Lipid Panel    Stress test (Vanessa Hare)   4. Chronic obstructive pulmonary disease, unspecified COPD type ([facility] [address]  [address]    Echo 2D w doppler w color complete    Lipid Panel    Stress test (Vanessa Hare)   5. Type 2 diabetes mellitus without complication, with long-term current use of insulin ([facility] [address]     6. Dyslipidemia     7. Obesity, Class II, BMI 35-39.9       Plan:     Plan    Chronic Diastolic Heart Failure:  Beta Blocker: Not indicated      ACE Inibitor/ARB: Continue Benzapril 10 mg daily       Diuretics: Continue furosemide (lasix)  20 mg daily  Nonpharmacologic management of Heart Failure: I advised him to try and keep his legs up whenever possible and to limit salt in his diet.      Laboratory testing: Lipid Profile   Additional Testing: I ordered a Lexiscan Stress test with Myoview imaging to help risk stratify for the likelihood of cardiac ischemia as the source of his symptoms   Additional Testing: I Ordered an Echocardiogram to assess MrMaribel Hesterlu Poles ejection fraction and to look for

## 2018-08-28 ENCOUNTER — HOSPITAL ENCOUNTER (OUTPATIENT)
Dept: NON INVASIVE DIAGNOSTICS | Age: 68
Discharge: HOME | End: 2018-08-28
Payer: MEDICARE

## 2018-08-28 ENCOUNTER — HOSPITAL ENCOUNTER (OUTPATIENT)
Dept: DIABETES SERVICES | Age: 68
Setting detail: THERAPIES SERIES
Discharge: HOME | End: 2018-08-28
Payer: MEDICARE

## 2018-08-28 DIAGNOSIS — J44.9 CHRONIC OBSTRUCTIVE PULMONARY DISEASE, UNSPECIFIED COPD TYPE (HCC): ICD-10-CM

## 2018-08-28 DIAGNOSIS — I50.32 CHRONIC DIASTOLIC HEART FAILURE (HCC): ICD-10-CM

## 2018-08-28 DIAGNOSIS — G47.33 OSA (OBSTRUCTIVE SLEEP APNEA): ICD-10-CM

## 2018-08-28 DIAGNOSIS — R07.9 CHEST PAIN, UNSPECIFIED TYPE: ICD-10-CM

## 2018-08-28 PROBLEM — E11.65 UNCONTROLLED TYPE 2 DIABETES MELLITUS WITH HYPERGLYCEMIA (HCC): Status: RESOLVED | Noted: 2018-08-01 | Resolved: 2018-08-28

## 2018-08-28 LAB
LV EF: 55 %
LVEF MODALITY: NORMAL

## 2018-08-28 PROCEDURE — 93306 TTE W/DOPPLER COMPLETE: CPT

## 2018-08-28 PROCEDURE — 3430000000 HC RX DIAGNOSTIC RADIOPHARMACEUTICAL: Performed by: INTERNAL MEDICINE

## 2018-08-28 PROCEDURE — 6360000002 HC RX W HCPCS: Performed by: INTERNAL MEDICINE

## 2018-08-28 PROCEDURE — 93018 CV STRESS TEST I&R ONLY: CPT | Performed by: FAMILY MEDICINE

## 2018-08-28 PROCEDURE — 78452 HT MUSCLE IMAGE SPECT MULT: CPT

## 2018-08-28 PROCEDURE — G0108 DIAB MANAGE TRN  PER INDIV: HCPCS

## 2018-08-28 PROCEDURE — A9500 TC99M SESTAMIBI: HCPCS | Performed by: INTERNAL MEDICINE

## 2018-08-28 PROCEDURE — 93017 CV STRESS TEST TRACING ONLY: CPT

## 2018-08-28 PROCEDURE — 78452 HT MUSCLE IMAGE SPECT MULT: CPT | Performed by: FAMILY MEDICINE

## 2018-08-28 PROCEDURE — 93016 CV STRESS TEST SUPVJ ONLY: CPT | Performed by: FAMILY MEDICINE

## 2018-08-28 RX ADMIN — Medication 30.2 MILLICURIE: at 09:03

## 2018-08-28 RX ADMIN — REGADENOSON 0.4 MG: 0.08 INJECTION, SOLUTION INTRAVENOUS at 09:00

## 2018-08-28 NOTE — PROGRESS NOTES
Diabetes Self-Management Education Record     Progress Note: Pt seen for further diabetes education today. Education today was focused on complications of diabetes and preventive screening. Pt was able to verbalize appropriate foot care from material he was given while inpatient. Pt has not had any episodes of hypoglycemia. Currently monitoring glucose 4 times a day and readings range from 125-180. Pt has stopped drinking regular soda and decreased intake of milk. He denies any missed doses of medication and is able to verbalize when and how he takes his medication. Meals are more consistent with timing. Reinforced the importance of exercise. Encouraged use of you tube videos for seated chair exercises to be implemented. Pt and wife seem open to this. Will see pt back in 2 weeks for further education.   Participant Name: Moo Medinan  Referring Provider:  Loren Sparks MD     Keys to learning:  Considerations: []Language []Emotional []Health Literacy  []Cognitive []Memory changes []Financial []Cultural   []Hoahaoism []Vision []Hearing  []Speech []Lack of desire  []Literacy  []Psycho-social  [x]None  If considerations are noted, accommodations made:  Identified barriers to learning/self management:      The following information was discussed:     [x] Diabetes disease process and treatment options   [x] Healthy nutrition, carbohydrate counting, meal planning  [x] Monitoring blood glucose and other parameters; interpreting and using results  [x] Acute complications--prevention, detection and treatment  [x] Medication management and safety Instructed by: [] Pharmacist [] nurse  [x] Incorporating physical activity into lifestyle Instructed by:[] Exercise physiologist [] nurse  [x] Exercise for Health, Reducing Risks for Heart Disease, Diabetes and Heart Health  [x] Preventing, through risk reduction behaviors, detecting, and treating chronic complications  [] Sick Day management  [] Developing              Session  Topics & Learning Objectives:          Comments:    Diabetes disease process & Treatment process: Define diabetes & identify own type of diabetes; Identify options for treating diabetes                          Rating  [] 1  [x] 2  [] 3  [] 4      [] NC  [] N/A    Rating  [] 1  [] 2  [x] 3  [] 4  [] NC  [] N/A       Rating  [] 1  [] 2  [] 3  [] 4  [] NC  [] N/A       Rating  [] 1  [] 2  [] 3  [] 4  [] NC  [] N/A               Incorporating nutritional management into lifestyle: Describe effect of type, amount & timing of food on blood glucose; Describe basic meal planning techniques & current nutrition guidelines; Correctly read food labels & demonstrate CHO counting & portion control with personalized meal plan. Rating  [] 1  [x] 2  [] 3  [] 4  [] NC  [] N/A       Rating  [] 1  [] 2  [x] 3  [] 4  [] NC  [] N/A       Rating  [] 1  [] 2  [] 3  [] 4  [] NC  [] N/A       Rating  [] 1  [] 2  [] 3  [] 4  [] NC  [] N/A                        Incorporating physical activity into lifestyle:   State effect of exercise on blood glucose levels. Identifies personal exercise plan. Discussed safety tips while exercising.                 Rating  [] 1  [x] 2  [] 3  [] 4  [] NC  [] N/A       Rating  [] 1  [] 2  [x] 3  [] 4  [] NC  [] N/A          Rating  [] 1  [] 2  [] 3  [] 4  [] NC  [] N/A       Rating  [] 1  [] 2  [] 3  [] 4  [] NC  [] N/A               Using medications safely: State effect of diabetes medicines on diabetes;   Name diabetes medication taking, action, timing & side effects       Rating  [] 1  [x] 2  [] 3  [] 4  [] NC  [] N/A          Rating  [] 1  [] 2  [x] 3  [] 4  [] NC  [] N/A          Rating  [] 1  [] 2  [] 3  [] 4  [] NC  [] N/A          Rating  [] 1  [] 2  [] 3  [] 4  [] NC  [] N/A               Monitoring blood glucose, interpreting and using results: Identify recommended blood glucose targets, personal targets, appropriate techniques and problem solving.     Rating  [] 1  [x] 2  [] 3  [] 4  [] NC  [] N/A       Rating  [] 1  [] 2  [x] 3  [] 4  [] NC  [] N/A          Rating  [] 1  [] 2  [] 3  [] 4  [] NC  [] N/A       Rating  [] 1  [] 2  [] 3  [] 4  [] NC  [] N/A            Prevention, detection & treatment of acute complications: List symptoms of hyper- and hypoglycemia; Describe how to treat low blood sugar & actions for lowering high blood glucose levels  Rating  [] 1  [x] 2  [] 3  [] 4  [] NC  [] N/A       Rating  [] 1  [] 2  [x] 3  [] 4  [] NC  [] N/A       Rating  [] 1  [] 2  [] 3  [] 4  [] NC  [] N/A       Rating  [] 1  [] 2  [] 3  [] 4  [] NC  [] N/A            Prevention, detection & treatment of chronic complications: Define the natural course of diabetes & describe the relationship of blood glucose levels to long term complications of diabetes                      Rating  [] 1  [x] 2  [] 3  [] 4  [] NC  [] N/A       Rating  [] 1  [] 2  [x] 3  [] 4  [] NC  [] N/A       Rating  [] 1  [] 2  [] 3  [] 4  [] NC  [] N/A       Rating  [] 1  [] 2  [] 3  [] 4  [] NC  [] N/A         Developing strategies to address psychosocial issues: Describe feelings about living with diabetes; Identify support needed & support network. Rating  [] 1  [x] 2  [] 3  [] 4  [] NC  [] N/A          Rating  [] 1  [] 2  [x] 3  [] 4  [] NC  [] N/A       Rating  [] 1  [] 2  [] 3  [] 4  [] NC  [] N/A       Rating  [] 1  [] 2  [] 3  [] 4  [] NC  [] N/A             Developing strategies to promote health/change behavior:  Define the ABCs of diabetes;  Identify appropriate screenings, schedule & personal plan for screenings.       Rating  [] 1  [x] 2  [] 3  [] 4  [] N  [] N/A       Rating  [] 1  [] 2  [x] 3  [] 4  [] NC  [] N/A       Rating  [] 1  [] 2  [] 3  [] 4  [] NC  [] N/A       Rating  [] 1  [] 2  [] 3  [] 4  [] NC  [] N/A                     Handouts/Booklets given:      [] Living Well with Diabetes    [] Daily Diabetic Meal Planning Guide   [] Nutrition in the [facility]    [] Resources for People With

## 2018-08-30 ENCOUNTER — HOSPITAL ENCOUNTER (OUTPATIENT)
Dept: NON INVASIVE DIAGNOSTICS | Age: 68
Discharge: HOME | End: 2018-08-30
Payer: MEDICARE

## 2018-08-30 PROCEDURE — A9500 TC99M SESTAMIBI: HCPCS | Performed by: INTERNAL MEDICINE

## 2018-08-30 PROCEDURE — 3430000000 HC RX DIAGNOSTIC RADIOPHARMACEUTICAL: Performed by: INTERNAL MEDICINE

## 2018-08-30 RX ADMIN — Medication 30 MILLICURIE: at 13:22

## 2018-08-31 NOTE — PROCEDURES
[address] [address]                                CARDIAC STRESS TEST    PATIENT NAME: Fiordaliza Keating                   :        1950  MED REC NO:   [id number]                              ROOM:  ACCOUNT NO:   [de-identified]                           ADMIT DATE: 2018  PROVIDER:     Linda Wilson    CARDIOVASCULAR DIAGNOSTIC DEPARTMENT    DATE OF STUDY:  2018    ORDERING PROVIDER:  Lexx Rivera MD    PRIMARY CARE PROVIDER:  Priicla Ballesteros MD    INTERPRETING PHYSICIAN:  Linda Wilson MD    PHARMACOLOGIC MYOCARDIAL PERFUSION STRESS TESTING    Stress/rest single isotope SPECT imaging with exercise stress and gated  SPECT imaging. INDICATIONS:  Assessment of recent chest pain and/or discomfort  Assessment of a cardiac cause of:  CHF    CLINICAL HISTORY:  The patient is a 26-year-old man with no known coronary  artery disease. Previous cardiac history includes:  Stress test, cardiac catheterization    Other previous history includes:  Chest pain, diabetes mellitus,  hypertension    Symptoms just prior to testing include:  None    Relevant medications:  None    PROCEDURE:  The heart rate was 76 at baseline and elisa to 111 beats per  minute during the regadenoson infusion. The rest blood pressure was  140/104 mm/Hg and increased to 182/90 mm/Hg. The patient did not complain  of any significant symptoms following infusion. Pharmacologic stress testing was performed with regadenoson at a dose of  0.4 mg. Additionally, low level exercise using slow treadmill walking was  performed along with vasodilator infusion. MYOCARDIAL PERFUSION IMAGING:  Imaging was performed at rest 30-45 minutes  following the injection of 30.0 mCi of sestamibi. Approximately 10 seconds  after Lexiscan injection, the patient was injected with 30.0 mCi of  sestamibi.   Gating post-stress tomographic imaging was performed 30-45  minutes after stress. STRESS ECG RESULTS:  The resting electrocardiogram demonstrated normal  sinus rhythm without significant ST-segment abnormalities that may impair  accurate ECG detection of stress induced cardiac ischemia. During vasodilator infusion and during recovery, the patient developed:    No significant ST segment changes suggestive of myocardial ischemia with no  premature atrial contractions (PACs) and no premature ventricular  contractions (PVCs). NUCLEAR IMAGING RESULTS:  The overall quality of the study is excellent. Mild attenuation artifact was seen. There is no evidence of abnormal lung  uptake. Additionally, the right ventricle appears normal.  The left  ventricular cavity is noted to be normal in size on the stress images. There is no evidence of transient ischemic dilatation (TID) of the left  ventricle. Gated SPECT imaging reveals normal myocardial thickening and wall motion  with a calculated left ventricular ejection fraction of 61%. The rest images demonstrate homogeneous tracer distribution throughout the  myocardium. On stress imaging, a small/moderate perfusion abnormality of mild intensity  in the inferoseptal and septal region(s) which may be due to artifact. IMPRESSION:  1. Equivocal myocardial perfusion study. There is a small/moderate  perfusion defect of mild intensity in the inferoseptal and septal region(s)  during stress imaging which is most consistent with ischemia but may be due  to artifact. 2.  Global left ventricular systolic function was normal without regional  wall motion abnormalities. 3.  No significant electrocardiographic evidence of myocardial ischemia  during EKG monitoring without significant associated arrhythmias. Overall, these results are most consistent with a low risk for significant  coronary artery disease.     Depending on the patient symptoms and level of clinical suspicion,  aggressive medical management vs. additional testing by coronary  angiography may be indicated. Светлана Hoang    D: 08/31/2018 9:30:16       T: 08/31/2018 9:31:41     [email or web address]  Job#: [id number]     Doc#: Unknown    CC:  Janet Tony.  conXt

## 2018-09-01 ENCOUNTER — HOSPITAL ENCOUNTER (OUTPATIENT)
Age: 68
Discharge: HOME | End: 2018-09-01
Payer: MEDICARE

## 2018-09-01 DIAGNOSIS — R07.9 CHEST PAIN, UNSPECIFIED TYPE: ICD-10-CM

## 2018-09-01 DIAGNOSIS — I50.32 CHRONIC DIASTOLIC HEART FAILURE (HCC): ICD-10-CM

## 2018-09-01 DIAGNOSIS — J44.9 CHRONIC OBSTRUCTIVE PULMONARY DISEASE, UNSPECIFIED COPD TYPE (HCC): ICD-10-CM

## 2018-09-01 DIAGNOSIS — G47.33 OSA (OBSTRUCTIVE SLEEP APNEA): ICD-10-CM

## 2018-09-01 LAB
CHOLESTEROL/HDL RATIO: 2.4
CHOLESTEROL: 95 MG/DL
HDLC SERPL-MCNC: 39 MG/DL
LDL CHOLESTEROL: 41 MG/DL (ref 0–130)
TRIGL SERPL-MCNC: 77 MG/DL
VLDLC SERPL CALC-MCNC: ABNORMAL MG/DL (ref 1–30)

## 2018-09-01 PROCEDURE — 36415 COLL VENOUS BLD VENIPUNCTURE: CPT

## 2018-09-01 PROCEDURE — 80061 LIPID PANEL: CPT

## 2018-09-05 ENCOUNTER — OFFICE VISIT (OUTPATIENT)
Dept: CARDIOLOGY | Age: 68
End: 2018-09-05
Payer: MEDICARE

## 2018-09-05 VITALS
DIASTOLIC BLOOD PRESSURE: 63 MMHG | OXYGEN SATURATION: 96 % | SYSTOLIC BLOOD PRESSURE: 117 MMHG | HEART RATE: 58 BPM | BODY MASS INDEX: 39.99 KG/M2 | WEIGHT: 254.8 LBS | HEIGHT: 67 IN

## 2018-09-05 DIAGNOSIS — I50.32 CHRONIC DIASTOLIC CONGESTIVE HEART FAILURE (HCC): Primary | ICD-10-CM

## 2018-09-05 DIAGNOSIS — E78.5 DYSLIPIDEMIA: ICD-10-CM

## 2018-09-05 DIAGNOSIS — I25.10 CORONARY ARTERY DISEASE INVOLVING NATIVE CORONARY ARTERY OF NATIVE HEART WITHOUT ANGINA PECTORIS: ICD-10-CM

## 2018-09-05 DIAGNOSIS — E66.9 OBESITY (BMI 35.0-39.9 WITHOUT COMORBIDITY): ICD-10-CM

## 2018-09-05 DIAGNOSIS — G47.33 OBSTRUCTIVE SLEEP APNEA: ICD-10-CM

## 2018-09-05 PROCEDURE — 1101F PT FALLS ASSESS-DOCD LE1/YR: CPT | Performed by: INTERNAL MEDICINE

## 2018-09-05 PROCEDURE — G8427 DOCREV CUR MEDS BY ELIG CLIN: HCPCS | Performed by: INTERNAL MEDICINE

## 2018-09-05 PROCEDURE — 3017F COLORECTAL CA SCREEN DOC REV: CPT | Performed by: INTERNAL MEDICINE

## 2018-09-05 PROCEDURE — G8417 CALC BMI ABV UP PARAM F/U: HCPCS | Performed by: INTERNAL MEDICINE

## 2018-09-05 PROCEDURE — G8598 ASA/ANTIPLAT THER USED: HCPCS | Performed by: INTERNAL MEDICINE

## 2018-09-05 PROCEDURE — 1123F ACP DISCUSS/DSCN MKR DOCD: CPT | Performed by: INTERNAL MEDICINE

## 2018-09-05 PROCEDURE — 99214 OFFICE O/P EST MOD 30 MIN: CPT | Performed by: INTERNAL MEDICINE

## 2018-09-05 PROCEDURE — 1036F TOBACCO NON-USER: CPT | Performed by: INTERNAL MEDICINE

## 2018-09-05 PROCEDURE — 4040F PNEUMOC VAC/ADMIN/RCVD: CPT | Performed by: INTERNAL MEDICINE

## 2018-09-05 NOTE — PROGRESS NOTES
considered to be at the upper limits of normal in size when corrected for body surface area. Stress Test on 08/28/2018: Acceptable myocardial perfusion. Most likely caused by artifact. Medical therapy. Exercise Tolerance: He reports having a a poor exercise tolerance. Mr. Sri Wilson says that he he is not very active but because of arthritis and chronic back pain. also because of his weight. no worsening shortness of breath as stated above. Past Medical History:    Past Medical History:   Diagnosis Date    Aortic aneurysm, thoracic ([facility] [address] 7/18/2016    Ataxia     Gait ataxia x 1 yr ago (approx. 2010)    [facility]est[facility] failure[facility]     COPD (chronic obstructive pulmonary disease) ([facility] [address] 4/4/2016    H/O cardiovascular stress test 08/28/2018    Equivocal myocardial perfusion study. There is a small/ moderate perfusion defect of mild intestiy in the inferoseptal and septal regions during stress imaging which is most consistent with ischemia but may be due to artifact. Overall, these results are most consistent with a low risk for CAD.     H/O echocardiogram 06/30/2016    EF >60%. Mild LV hypertrophy with a normal LV cavity size. Aortic leaflet calcification with moderate aortic stenosis with a mean greadient of 20 mmHg. Mild pulmonary hypertension estimated right ventricular systolic pressure of 37 mmHg. Mild tricuspid regurgitation. Moderate diastolic dysfunction.  H/O echocardiogram 08/28/2018    EF >55%. The LV wall thickness is mildly increased. The pt has a sigmoid interventricular septum without evidence of outflow tract obstruction. Mild to modereate mitral regurg. Mild to tricuspid regurg. Evidence of mild (grade I) diastolic dysfunction is seen. The aortic root is considered to be at the upper limits of normal in size when corrected for body surface area.      Heart failure ([address] 2003    History of echocardiogram 11/20/2014    Global left ventricular systolic function appears to be difficulties or ringing in the ears. RESPIRATORY: See HPI  CARDIOVASCULAR: See HPI  GI: No nausea, vomiting, diarrhea, constipation, abdominal pain or changes in bowel habits. : No urinary frequency, urgency, incontinence hematuria or dysuria. SKIN: No cyanosis or skin lesions. MUSCULOSKELETAL: Chronic back pain. Bilateral knee and hip pain. NEUROLOGICAL: No syncope or TIA-like symptoms. PSYCHIATRIC: No anxiety, pain, insomnia or depression    Objective:     PHYSICAL EXAM:      VITALS:    /63 (Site: Left Arm, Position: Sitting, Cuff Size: Large Adult)   Pulse 58   Ht 5' 7\" (1.702 m)   Wt 254 lb 12.8 oz (115.6 kg)   SpO2 96%   BMI 39.91 kg/m²   CONSTITUTIONAL: Cooperative, no apparent distress, and appears well nourished / developed. NEUROLOGIC:  Awake and orientated to person, place and time. HEENT: Sclera non-icteric, normocephalic, neck supple, no elevation of JVP, normal carotid pulses with no bruits and thyroid normal size. LUNGS:  No increased work of breathing. Poor air entry bilaterally. No significant wheezing. Federico Rotunda CARDIOVASCULAR:  Regular rate and rhythm with no murmurs, gallops, rubs, or abnormal heart sounds. The apical impulses not displaced. The carotid upstroke is normal in amplitude and contour without delay or bruit. JVP is not elevated. ABDOMEN:  Normal bowel sounds, non-distended and non-tender to palpation. EXT: No significant edema.     DATA:    Lab Results   Component Value Date    ALT 51 (H) 08/03/2018    AST 37 08/03/2018    ALKPHOS 83 08/03/2018    BILITOT 0.89 08/03/2018     Lab Results   Component Value Date    CREATININE 0.69 (L) 08/03/2018    BUN 13 08/03/2018     08/03/2018    K 4.0 08/03/2018    CL 99 08/03/2018    CO2 27 08/03/2018     Lab Results   Component Value Date    TSH 4.01 02/08/2016     Lab Results   Component Value Date    WBC 9.5 08/03/2018    HGB 12.3 (L) 08/03/2018    HCT 37.1 (L) 08/03/2018    MCV 89.0 08/03/2018     08/03/2018

## 2018-09-05 NOTE — PATIENT INSTRUCTIONS
SURVEY:    You may be receiving a survey from [facility] regarding your visit today. Please complete the survey to enable us to provide the highest quality of care to you and your family. If you cannot score us a very good on any question, please call the office to discuss how we could have made your experience a very good one. Thank you.

## 2018-09-20 ENCOUNTER — HOSPITAL ENCOUNTER (OUTPATIENT)
Dept: NUTRITION | Age: 68
Discharge: HOME | End: 2018-09-20

## 2018-09-20 NOTE — PROGRESS NOTES
This is a notice of failure to show for a medical nutrition therapy appointment. Dayana Marvin had an appointment with the dietitian this date, but did not show, cancel or reschedule before the appointment time. Diabetes educator called patient at time of a appointment, to learn that they had forgotten. A new appointment date was made with Centralized Scheduling.      Thank you for the referral.    Electronically signed by Justyn Boswell RD, LD on 9/20/2018 at 11:26 AM

## 2018-09-27 ENCOUNTER — HOSPITAL ENCOUNTER (OUTPATIENT)
Dept: DIABETES SERVICES | Age: 68
Setting detail: THERAPIES SERIES
Discharge: HOME | End: 2018-09-27
Payer: MEDICARE

## 2018-09-27 PROCEDURE — G0108 DIAB MANAGE TRN  PER INDIV: HCPCS

## 2018-09-27 NOTE — PROGRESS NOTES
treatment  [x] Medication management and safety Instructed by: [] Pharmacist [] nurse  [x] Incorporating physical activity into lifestyle Instructed by:[] Exercise physiologist [] nurse  [x] Exercise for Health, Reducing Risks for Heart Disease, Diabetes and Heart Health  [x] Preventing, through risk reduction behaviors, detecting, and treating chronic complications  [x] Sick Day management  [x] Developing personalized strategies to address psychosocial issues and concerns  [x] Developing personalized strategies to promote health and behavior change through goalsetting, behavior change strategies aimed at risk reduction  [x] Special situations--disaster planning, travel, social activities                     Session Assessment & Evaluation Ratings:  1=Needs Instruction  2=Needs Review  3=Comprehends Key Points  4=Demonstrates Understanding/Competency  NC=Not Covered   N/A=Not Applicable Initial  Assess     Date:  08/09/18 2nd  Visit     Date:   08/28/18 3rd  Visit     Date:  09/27/18 4th  Visit     Date: Comments  S.O.C=Stage of Change/Readiness to change:  · Pre=Pre-contemplation stage--not thinking about changing  · C=Contemplation stageambivalent about changing  · P=Preparation stage--prepared to made a specific change  · A=Action stage--committed to modify behaviors  · M=Maintenance and relapse prevention--incorporating new behavior     Participant Stated  Goal         Start some form of physical activity                             Participant Stated Goal  Eat 3 meals       S. O.C  [] PRE  [x] C  [] P      [] A  [] M                             S. O.C  [] PRE  [x] C  [] P      [] A  [] M       S. O.C  [] PRE  [x] C  [] P      [] A  [] M                              S. O.C  [] PRE  [] C  [x] P      [] A  [] M        S. O.C  [] PRE  [x] C  [] P      [] A  [] M                             S. O.C  [] PRE  [] C  [x] P      [] A  [] M       S. O.C  [] PRE  [] C  [] P      [] A  [] M                             S. O.C  [] PRE  [] C  [] P      [] A  [] M    Current main goal attainment frequency:   [x] Never  [] Some  [] Half  [] Most  [] All     Participant confidence to master goal this visit:   [] Excellent  [x] Good  [] Fair  [] Poor                 Current main goal attainment frequency:   [] Never  [] Some  [] Half  [x] Most  [] All     Participant confidence to master goal this visit:   [] Excellent  [x] Good [] Fair  [] Poor                      Session  Topics & Learning Objectives:          Comments:     Diabetes disease process & Treatment process: Define diabetes & identify own type of diabetes; Identify options for treating diabetes                          Rating  [] 1  [x] 2  [] 3  [] 4      [] NC  [] N/A    Rating  [] 1  [] 2  [x] 3  [] 4  [] NC  [] N/A       Rating  [] 1  [] 2  [x] 3  [] 4  [] NC  [] N/A       Rating  [] 1  [] 2  [] 3  [] 4  [] NC  [] N/A                Incorporating nutritional management into lifestyle: Describe effect of type, amount & timing of food on blood glucose; Describe basic meal planning techniques & current nutrition guidelines; Correctly read food labels & demonstrate CHO counting & portion control with personalized meal plan.   Rating  [] 1  [x] 2  [] 3  [] 4  [] NC  [] N/A       Rating  [] 1  [] 2  [x] 3  [] 4  [] NC  [] N/A       Rating  [] 1  [] 2  [x] 3  [] 4  [] NC  [] N/A       Rating  [] 1  [] 2  [] 3  [] 4  [] NC  [] N/A                         Incorporating physical activity into lifestyle:   State effect of exercise on blood glucose levels.  Identifies personal exercise plan.    Discussed safety tips while exercising.                 Rating  [] 1  [x] 2  [] 3  [] 4  [] NC  [] N/A       Rating  [] 1  [] 2  [x] 3  [] 4  [] NC  [] N/A          Rating  [] 1  [] 2  [x] 3  [] 4  [] NC  [] N/A       Rating  [] 1  [] 2  [] 3  [] 4  [] NC  [] N/A                Using medications safely: State effect of diabetes medicines on diabetes;   Name diabetes medication taking, action, timing & side effects       Rating  [] 1  [x] 2  [] 3  [] 4  [] NC  [] N/A          Rating  [] 1  [] 2  [x] 3  [] 4  [] NC  [] N/A          Rating  [] 1  [] 2  [x] 3  [] 4  [] NC  [] N/A          Rating  [] 1  [] 2  [] 3  [] 4  [] NC  [] N/A                Monitoring blood glucose, interpreting and using results: Identify recommended blood glucose targets, personal targets, appropriate techniques and problem solving.    Rating  [] 1  [x] 2  [] 3  [] 4  [] NC  [] N/A       Rating  [] 1  [] 2  [x] 3  [] 4  [] NC  [] N/A          Rating  [] 1  [] 2  [x] 3  [] 4  [] NC  [] N/A       Rating  [] 1  [] 2  [] 3  [] 4  [] NC  [] N/A        Given handout on target range. Discussed A1C target range.       Prevention, detection & treatment of acute complications: List symptoms of hyper- and hypoglycemia; Describe how to treat low blood sugar & actions for lowering high blood glucose levels  Rating  [] 1  [x] 2  [] 3  [] 4  [] NC  [] N/A       Rating  [] 1  [] 2  [x] 3  [] 4  [] NC  [] N/A       Rating  [] 1  [] 2  [x] 3  [] 4  [] NC  [] N/A       Rating  [] 1  [] 2  [] 3  [] 4  [] NC  [] N/A             Prevention, detection & treatment of chronic complications: Define the natural course of diabetes & describe the relationship of blood glucose levels to long term complications of diabetes                      Rating  [] 1  [x] 2  [] 3  [] 4  [] NC  [] N/A       Rating  [] 1  [] 2  [x] 3  [] 4  [] NC  [] N/A       Rating  [] 1  [] 2  [x] 3  [] 4  [] NC  [] N/A       Rating  [] 1  [] 2  [] 3  [] 4  [] NC  [] N/A          Developing strategies to address psychosocial issues: Describe feelings about living with diabetes;  Identify support needed & support network.  Rating  [] 1  [x] 2  [] 3  [] 4  [] NC  [] N/A          Rating  [] 1  [] 2  [x] 3  [] 4  [] NC  [] N/A       Rating  [] 1  [] 2  [x] 3  [] 4  [] NC  [] N/A       Rating  [] 1  [] 2  [] 3  [] 4  [] NC  [] N/A

## 2018-10-26 ENCOUNTER — HOSPITAL ENCOUNTER (OUTPATIENT)
Dept: NUTRITION | Age: 68
Discharge: HOME | End: 2018-10-26
Payer: MEDICARE

## 2018-10-26 VITALS — WEIGHT: 252 LBS | BODY MASS INDEX: 39.55 KG/M2 | HEIGHT: 67 IN

## 2018-10-26 PROCEDURE — 97802 MEDICAL NUTRITION INDIV IN: CPT

## 2019-03-12 ENCOUNTER — OFFICE VISIT (OUTPATIENT)
Dept: CARDIOLOGY | Age: 69
End: 2019-03-12
Payer: MEDICARE

## 2019-03-12 VITALS
HEART RATE: 71 BPM | WEIGHT: 248 LBS | BODY MASS INDEX: 37.59 KG/M2 | HEIGHT: 68 IN | OXYGEN SATURATION: 97 % | DIASTOLIC BLOOD PRESSURE: 76 MMHG | SYSTOLIC BLOOD PRESSURE: 111 MMHG | RESPIRATION RATE: 18 BRPM

## 2019-03-12 DIAGNOSIS — Z86.73 PERSONAL HISTORY OF TIA (TRANSIENT ISCHEMIC ATTACK): ICD-10-CM

## 2019-03-12 DIAGNOSIS — I50.32 CHRONIC DIASTOLIC HEART FAILURE (HCC): Primary | ICD-10-CM

## 2019-03-12 DIAGNOSIS — E66.01 CLASS 2 SEVERE OBESITY WITH SERIOUS COMORBIDITY AND BODY MASS INDEX (BMI) OF 37.0 TO 37.9 IN ADULT, UNSPECIFIED OBESITY TYPE (HCC): ICD-10-CM

## 2019-03-12 DIAGNOSIS — E78.5 DYSLIPIDEMIA: ICD-10-CM

## 2019-03-12 DIAGNOSIS — G47.33 OSA (OBSTRUCTIVE SLEEP APNEA): ICD-10-CM

## 2019-03-12 DIAGNOSIS — I25.10 MILD CAD: ICD-10-CM

## 2019-03-12 PROCEDURE — 99214 OFFICE O/P EST MOD 30 MIN: CPT | Performed by: INTERNAL MEDICINE

## 2019-03-12 PROCEDURE — 1101F PT FALLS ASSESS-DOCD LE1/YR: CPT | Performed by: INTERNAL MEDICINE

## 2019-03-12 PROCEDURE — G8427 DOCREV CUR MEDS BY ELIG CLIN: HCPCS | Performed by: INTERNAL MEDICINE

## 2019-03-12 PROCEDURE — G8482 FLU IMMUNIZE ORDER/ADMIN: HCPCS | Performed by: INTERNAL MEDICINE

## 2019-03-12 PROCEDURE — G8598 ASA/ANTIPLAT THER USED: HCPCS | Performed by: INTERNAL MEDICINE

## 2019-03-12 PROCEDURE — 4040F PNEUMOC VAC/ADMIN/RCVD: CPT | Performed by: INTERNAL MEDICINE

## 2019-03-12 PROCEDURE — 3017F COLORECTAL CA SCREEN DOC REV: CPT | Performed by: INTERNAL MEDICINE

## 2019-03-12 PROCEDURE — 1036F TOBACCO NON-USER: CPT | Performed by: INTERNAL MEDICINE

## 2019-03-12 PROCEDURE — G8417 CALC BMI ABV UP PARAM F/U: HCPCS | Performed by: INTERNAL MEDICINE

## 2019-03-12 PROCEDURE — 1123F ACP DISCUSS/DSCN MKR DOCD: CPT | Performed by: INTERNAL MEDICINE

## 2019-07-29 DIAGNOSIS — R53.83 FATIGUE, UNSPECIFIED TYPE: ICD-10-CM

## 2019-07-29 DIAGNOSIS — R53.83 TIRED: Primary | ICD-10-CM

## 2019-08-26 ENCOUNTER — OFFICE VISIT (OUTPATIENT)
Dept: CARDIOLOGY | Age: 69
End: 2019-08-26
Payer: MEDICARE

## 2019-08-26 VITALS
DIASTOLIC BLOOD PRESSURE: 81 MMHG | RESPIRATION RATE: 18 BRPM | OXYGEN SATURATION: 96 % | HEART RATE: 69 BPM | HEIGHT: 70 IN | BODY MASS INDEX: 35.13 KG/M2 | SYSTOLIC BLOOD PRESSURE: 126 MMHG | WEIGHT: 245.4 LBS

## 2019-08-26 DIAGNOSIS — E66.01 CLASS 2 SEVERE OBESITY WITH SERIOUS COMORBIDITY AND BODY MASS INDEX (BMI) OF 37.0 TO 37.9 IN ADULT, UNSPECIFIED OBESITY TYPE (HCC): ICD-10-CM

## 2019-08-26 DIAGNOSIS — E78.5 DYSLIPIDEMIA: ICD-10-CM

## 2019-08-26 DIAGNOSIS — I50.32 CHRONIC DIASTOLIC HEART FAILURE (HCC): Primary | ICD-10-CM

## 2019-08-26 DIAGNOSIS — I25.10 MILD CAD: ICD-10-CM

## 2019-08-26 DIAGNOSIS — G47.33 OSA (OBSTRUCTIVE SLEEP APNEA): ICD-10-CM

## 2019-08-26 PROCEDURE — G8417 CALC BMI ABV UP PARAM F/U: HCPCS | Performed by: INTERNAL MEDICINE

## 2019-08-26 PROCEDURE — 1036F TOBACCO NON-USER: CPT | Performed by: INTERNAL MEDICINE

## 2019-08-26 PROCEDURE — 4040F PNEUMOC VAC/ADMIN/RCVD: CPT | Performed by: INTERNAL MEDICINE

## 2019-08-26 PROCEDURE — 3017F COLORECTAL CA SCREEN DOC REV: CPT | Performed by: INTERNAL MEDICINE

## 2019-08-26 PROCEDURE — G8427 DOCREV CUR MEDS BY ELIG CLIN: HCPCS | Performed by: INTERNAL MEDICINE

## 2019-08-26 PROCEDURE — 1123F ACP DISCUSS/DSCN MKR DOCD: CPT | Performed by: INTERNAL MEDICINE

## 2019-08-26 PROCEDURE — G8598 ASA/ANTIPLAT THER USED: HCPCS | Performed by: INTERNAL MEDICINE

## 2019-08-26 PROCEDURE — 93000 ELECTROCARDIOGRAM COMPLETE: CPT | Performed by: INTERNAL MEDICINE

## 2019-08-26 PROCEDURE — 99214 OFFICE O/P EST MOD 30 MIN: CPT | Performed by: INTERNAL MEDICINE

## 2019-08-26 RX ORDER — NITROGLYCERIN 0.4 MG/1
0.4 TABLET SUBLINGUAL EVERY 5 MIN PRN
Qty: 25 TABLET | Refills: 3 | Status: SHIPPED | OUTPATIENT
Start: 2019-08-26 | End: 2022-04-22

## 2019-10-22 ENCOUNTER — HOSPITAL ENCOUNTER (OUTPATIENT)
Dept: SLEEP CENTER | Age: 69
Discharge: HOME | End: 2019-10-22
Payer: MEDICARE

## 2019-10-22 DIAGNOSIS — G47.33 OSA (OBSTRUCTIVE SLEEP APNEA): ICD-10-CM

## 2019-10-22 PROCEDURE — 95810 POLYSOM 6/> YRS 4/> PARAM: CPT

## 2019-10-24 DIAGNOSIS — G47.33 OSA (OBSTRUCTIVE SLEEP APNEA): Primary | ICD-10-CM

## 2019-10-24 LAB — STATUS: NORMAL

## 2019-12-16 ENCOUNTER — HOSPITAL ENCOUNTER (OUTPATIENT)
Dept: SLEEP CENTER | Age: 69
Discharge: HOME | End: 2019-12-16
Payer: MEDICARE

## 2019-12-16 DIAGNOSIS — G47.33 OSA (OBSTRUCTIVE SLEEP APNEA): ICD-10-CM

## 2019-12-16 PROCEDURE — 95811 POLYSOM 6/>YRS CPAP 4/> PARM: CPT

## 2019-12-17 LAB — STATUS: NORMAL

## 2020-05-19 ENCOUNTER — HOSPITAL ENCOUNTER (OUTPATIENT)
Age: 70
Discharge: HOME | End: 2020-05-19
Payer: MEDICARE

## 2020-05-19 ENCOUNTER — OFFICE VISIT (OUTPATIENT)
Dept: CARDIOLOGY | Age: 70
End: 2020-05-19
Payer: MEDICARE

## 2020-05-19 VITALS
RESPIRATION RATE: 18 BRPM | BODY MASS INDEX: 37.85 KG/M2 | OXYGEN SATURATION: 96 % | HEART RATE: 84 BPM | HEIGHT: 70 IN | SYSTOLIC BLOOD PRESSURE: 134 MMHG | WEIGHT: 264.4 LBS | DIASTOLIC BLOOD PRESSURE: 77 MMHG

## 2020-05-19 LAB
ANION GAP SERPL CALCULATED.3IONS-SCNC: 13 MMOL/L (ref 9–17)
BNP INTERPRETATION: NORMAL
BUN BLDV-MCNC: 14 MG/DL (ref 8–23)
BUN/CREAT BLD: 19 (ref 9–20)
CALCIUM SERPL-MCNC: 9.9 MG/DL (ref 8.6–10.4)
CHLORIDE BLD-SCNC: 101 MMOL/L (ref 98–107)
CO2: 26 MMOL/L (ref 20–31)
CREAT SERPL-MCNC: 0.75 MG/DL (ref 0.7–1.2)
ESTIMATED AVERAGE GLUCOSE: 120 MG/DL
GFR AFRICAN AMERICAN: >60 ML/MIN
GFR NON-AFRICAN AMERICAN: >60 ML/MIN
GFR SERPL CREATININE-BSD FRML MDRD: ABNORMAL ML/MIN/{1.73_M2}
GFR SERPL CREATININE-BSD FRML MDRD: ABNORMAL ML/MIN/{1.73_M2}
GLUCOSE BLD-MCNC: 136 MG/DL (ref 70–99)
HBA1C MFR BLD: 5.8 % (ref 4.8–5.9)
HCT VFR BLD CALC: 39.5 % (ref 40.7–50.3)
HEMOGLOBIN: 12.7 G/DL (ref 13–17)
MCH RBC QN AUTO: 30 PG (ref 25.2–33.5)
MCHC RBC AUTO-ENTMCNC: 32.2 G/DL (ref 28.4–34.8)
MCV RBC AUTO: 93.2 FL (ref 82.6–102.9)
NRBC AUTOMATED: 0 PER 100 WBC
PDW BLD-RTO: 13.6 % (ref 11.8–14.4)
PLATELET # BLD: 215 K/UL (ref 138–453)
PMV BLD AUTO: 9 FL (ref 8.1–13.5)
POTASSIUM SERPL-SCNC: 4 MMOL/L (ref 3.7–5.3)
PRO-BNP: 72 PG/ML
RBC # BLD: 4.24 M/UL (ref 4.21–5.77)
SODIUM BLD-SCNC: 140 MMOL/L (ref 135–144)
WBC # BLD: 7.2 K/UL (ref 3.5–11.3)

## 2020-05-19 PROCEDURE — 85027 COMPLETE CBC AUTOMATED: CPT

## 2020-05-19 PROCEDURE — 83036 HEMOGLOBIN GLYCOSYLATED A1C: CPT

## 2020-05-19 PROCEDURE — 83880 ASSAY OF NATRIURETIC PEPTIDE: CPT

## 2020-05-19 PROCEDURE — 1036F TOBACCO NON-USER: CPT | Performed by: INTERNAL MEDICINE

## 2020-05-19 PROCEDURE — 36415 COLL VENOUS BLD VENIPUNCTURE: CPT

## 2020-05-19 PROCEDURE — 1123F ACP DISCUSS/DSCN MKR DOCD: CPT | Performed by: INTERNAL MEDICINE

## 2020-05-19 PROCEDURE — 4040F PNEUMOC VAC/ADMIN/RCVD: CPT | Performed by: INTERNAL MEDICINE

## 2020-05-19 PROCEDURE — 3017F COLORECTAL CA SCREEN DOC REV: CPT | Performed by: INTERNAL MEDICINE

## 2020-05-19 PROCEDURE — 80048 BASIC METABOLIC PNL TOTAL CA: CPT

## 2020-05-19 PROCEDURE — 3044F HG A1C LEVEL LT 7.0%: CPT | Performed by: INTERNAL MEDICINE

## 2020-05-19 PROCEDURE — 99211 OFF/OP EST MAY X REQ PHY/QHP: CPT | Performed by: INTERNAL MEDICINE

## 2020-05-19 PROCEDURE — G8417 CALC BMI ABV UP PARAM F/U: HCPCS | Performed by: INTERNAL MEDICINE

## 2020-05-19 PROCEDURE — G8427 DOCREV CUR MEDS BY ELIG CLIN: HCPCS | Performed by: INTERNAL MEDICINE

## 2020-05-19 PROCEDURE — 99214 OFFICE O/P EST MOD 30 MIN: CPT | Performed by: INTERNAL MEDICINE

## 2020-05-19 PROCEDURE — 2022F DILAT RTA XM EVC RTNOPTHY: CPT | Performed by: INTERNAL MEDICINE

## 2020-05-19 RX ORDER — BENAZEPRIL HYDROCHLORIDE 5 MG/1
5 TABLET, FILM COATED ORAL DAILY
Qty: 90 TABLET | Refills: 3 | Status: SHIPPED | OUTPATIENT
Start: 2020-05-19 | End: 2021-09-07 | Stop reason: SDUPTHER

## 2020-05-19 NOTE — PROGRESS NOTES
apparent distress, and appears well nourished / developed. NEUROLOGIC:  Awake and orientated to person, place and time. HEENT: Sclera non-icteric, normocephalic, neck supple, no elevation of JVP, normal carotid pulses with no bruits and thyroid normal size. LUNGS:  No increased work of breathing. Poor air entry bilaterally. No significant wheezing. .  Cardiovascular: Normal rate, regular rhythm, normal heart sounds. Exam reveals no gallop and no friction rubs. 2/6 systolic murmur, 5th intercostal space on the LEFT in the mid-clavicular line (cardiac apex)  ABDOMEN:  Normal bowel sounds, non-distended and non-tender to palpation. Extremities: No significant edema. No cyanosis or clubbing. 2+ radial and carotid pulses. Distal extremity pulses: 2+ bilaterally. DATA:    Lab Results   Component Value Date    ALT 51 (H) 08/03/2018    AST 37 08/03/2018    ALKPHOS 83 08/03/2018    BILITOT 0.89 08/03/2018     Lab Results   Component Value Date    CREATININE 0.69 (L) 08/03/2018    BUN 13 08/03/2018     08/03/2018    K 4.0 08/03/2018    CL 99 08/03/2018    CO2 27 08/03/2018     Lab Results   Component Value Date    TSH 4.01 02/08/2016     Lab Results   Component Value Date    WBC 9.5 08/03/2018    HGB 12.3 (L) 08/03/2018    HCT 37.1 (L) 08/03/2018    MCV 89.0 08/03/2018     08/03/2018         Assessment:      Diagnosis Orders   1. Chronic diastolic heart failure (HCC)  Basic Metabolic Panel    CBC    Brain Natriuretic Peptide   2. Mild CAD     3. Dyslipidemia     4. DAINA (obstructive sleep apnea)     5. Class 2 severe obesity with serious comorbidity and body mass index (BMI) of 37.0 to 37.9 in adult, unspecified obesity type ([facility] [address]     6. Type 2 diabetes mellitus without complication, with long-term current use of insulin ([facility])  Hemoglobin A1C     Plan:     Chronic Diastolic Heart Failure:  ACE Inibitor/ARB: Decrease Benazepril 5 mg daily    Because of low blood pressure.   Diuretics: Continue furosemide (lasix)  40 mg daily  Nonpharmacologic management of Heart Failure: I advised him to try and keep his legs up whenever possible and to limit salt in his diet. Laboratory testing: BMP, CBC and BNP      Mild to Moderate Atherosclerotic Heart Disease: Cardiac cath 7/21/2016: Mild to moderate three vessel coronary artery disease without any significant focal stenosis. Stress test on 8/22/2018 showed no significant ischemia. Medical therapy. Antiplatelet Agent: Patient stopped due to bleeding too easily  aspirin 81 mg dailyI told him she should go back on low-dose aspirin and take it on a full stomach. He has multiple risk factor for CAD and nonobstructive CAD by cardiac cath. Beta Blocker: Not indicated. Normal ejection fraction    no angina or history of myocardial infarction. Statin Therapy: Continue atorvastatin (Lipitor) 40 mg nightly. · Dyslipidemia:  · Statin Therapy: Continue Lipitor as above. · Follow-up repeat lipid panel. · Obstructive Sleep Apnea: He has a CPAP titration study back in December 2019. · Morbid Obesity:   · I also briefly discussed both diet and exercise strategies for him to continue to loses weight and he was very receptive to this. I also printed out some simple lifestyle changes that he can make in his diet to help him lose weight. · Type 2 Diabetes:   · Continue current treatment. · Laboratory testing: A1C    Finally, I recommended that he continue his other medications and follow up with you as previously scheduled. FOLLOW UP:   I told Mr. Marylen Friar to call my office if he had any problems, but otherwise told him to Return in about 6 months (around 11/19/2020). However, I would be happy to see him sooner should the need arise. Sincerely,   Vane Acosta MD, MS, F.A.C.C.   [facility] [place]) [facility], [address] [facility], [facility], [address] [place] [address]  Phone: [phone], Fax: [phone]     I believe that the risk of significant morbidity and mortality

## 2020-12-15 PROBLEM — I25.10 CORONARY ATHEROSCLEROSIS: Status: ACTIVE | Noted: 2020-12-15

## 2021-04-09 ENCOUNTER — OFFICE VISIT (OUTPATIENT)
Dept: PRIMARY CARE CLINIC | Age: 71
End: 2021-04-09
Payer: MEDICARE

## 2021-04-09 VITALS
BODY MASS INDEX: 40.25 KG/M2 | HEART RATE: 72 BPM | SYSTOLIC BLOOD PRESSURE: 126 MMHG | RESPIRATION RATE: 18 BRPM | DIASTOLIC BLOOD PRESSURE: 84 MMHG | WEIGHT: 257 LBS

## 2021-04-09 DIAGNOSIS — Z79.4 TYPE 2 DIABETES MELLITUS WITHOUT COMPLICATION, WITH LONG-TERM CURRENT USE OF INSULIN (HCC): Primary | ICD-10-CM

## 2021-04-09 DIAGNOSIS — E11.9 TYPE 2 DIABETES MELLITUS WITHOUT COMPLICATION, WITH LONG-TERM CURRENT USE OF INSULIN (HCC): Primary | ICD-10-CM

## 2021-04-09 LAB — HBA1C MFR BLD: 6.5 %

## 2021-04-09 PROCEDURE — 3044F HG A1C LEVEL LT 7.0%: CPT | Performed by: FAMILY MEDICINE

## 2021-04-09 PROCEDURE — 2022F DILAT RTA XM EVC RTNOPTHY: CPT | Performed by: FAMILY MEDICINE

## 2021-04-09 PROCEDURE — 99213 OFFICE O/P EST LOW 20 MIN: CPT | Performed by: FAMILY MEDICINE

## 2021-04-09 PROCEDURE — 83036 HEMOGLOBIN GLYCOSYLATED A1C: CPT | Performed by: FAMILY MEDICINE

## 2021-04-09 PROCEDURE — G8427 DOCREV CUR MEDS BY ELIG CLIN: HCPCS | Performed by: FAMILY MEDICINE

## 2021-04-09 PROCEDURE — G8417 CALC BMI ABV UP PARAM F/U: HCPCS | Performed by: FAMILY MEDICINE

## 2021-04-09 PROCEDURE — 1123F ACP DISCUSS/DSCN MKR DOCD: CPT | Performed by: FAMILY MEDICINE

## 2021-04-09 PROCEDURE — 4040F PNEUMOC VAC/ADMIN/RCVD: CPT | Performed by: FAMILY MEDICINE

## 2021-04-09 PROCEDURE — 1036F TOBACCO NON-USER: CPT | Performed by: FAMILY MEDICINE

## 2021-04-09 PROCEDURE — 3017F COLORECTAL CA SCREEN DOC REV: CPT | Performed by: FAMILY MEDICINE

## 2021-04-09 NOTE — PROGRESS NOTES
Ezequiel Sue is a 69 y.o. male here for routine follow up of diabetes. He has been checking  his blood glucose levels regularly. He denies numbness and tingling in the hands and feet. He has been compliant with exercise, but not really diet. He has been compliant with his medications. He is tolerating medications. Hypertension: Patient here for follow-up of elevated blood pressure. He is exercising by walking around the store and is not adherent to low salt diet. Blood pressure is well controlled at home. Cardiac symptoms chest pain. He said this is occasional,  Nothing like he had before. Patient denies none. Cardiovascular risk factors: advanced age (older than 54 for men, 72 for women), diabetes mellitus, hypertension, male gender and obesity (BMI >= 30 kg/m2). Use of agents associated with hypertension: none. Depression: Patient here for a follow up of depression. He denies current suicidal and homicidal plan or intent. Risk factors: none Previous treatment includes BuSpar . He complains of the following side effects from the treatment: none. He said that he does not have depression. Patient has complaints of frequent diarrhea. He has been taking Imodium, which he said does help sometimes. Allergies:  Sulfa antibiotics and Pcn [penicillins]    Past Medical History:    Past Medical History:   Diagnosis Date    Aortic aneurysm, thoracic ([facility] [address] 7/18/2016    Ataxia     Gait ataxia x 1 yr ago (approx. 2010)    CH[facility]congestive heart failure) [facility])     COPD (chronic obstructive pulmonary disease) ([facility] [address] 4/4/2016    H/O cardiovascular stress test 08/28/2018    Equivocal myocardial perfusion study. There is a small/ moderate perfusion defect of mild intestiy in the inferoseptal and septal regions during stress imaging which is most consistent with ischemia but may be due to artifact.  Overall, these results are most consistent with a low risk for CAD.     H/O echocardiogram 06/30/2016    EF >60%. Mild LV hypertrophy with a normal LV cavity size. Aortic leaflet calcification with moderate aortic stenosis with a mean greadient of 20 mmHg. Mild pulmonary hypertension estimated right ventricular systolic pressure of 37 mmHg. Mild tricuspid regurgitation. Moderate diastolic dysfunction.  H/O echocardiogram 08/28/2018    EF >55%. The LV wall thickness is mildly increased. The pt has a sigmoid interventricular septum without evidence of outflow tract obstruction. Mild to modereate mitral regurg. Mild to tricuspid regurg. Evidence of mild (grade I) diastolic dysfunction is seen. The aortic root is considered to be at the upper limits of normal in size when corrected for body surface area.  Heart failure ([address] 2003    History of echocardiogram 11/20/2014    Global left ventricular systolic function appears to be hyperdynamic with EF of  > 70% Left atrium is mildly dilated (29-33) with left atrial volume index of 31 ml/m2. Moderate regurgitation, mild tricuspid regurgitation. Evidence of moderate (gradeI II )  diastolic dysfunction is seen.  History of stress test 12/30/15    Normal. EF 63%. Low risk for significant CAD.  Hx of cardiac cath 07/21/2016    LMCA: Mild irregularites 10-20%. LAD: Mild irregularities 40-50%. LCx: Mild irregularities 30-40%. RCA: Mild irregularities 30-40%.     Hypertension     Obesity due to excess calories 4/4/2016    DAINA (obstructive sleep apnea) 4/4/2016    Polyneuropathy     Dec 2010    Smoking 4/4/2016    TIA (transient ischemic attack) 2003 & 2004    Type 2 diabetes mellitus without complication [facility])        Past Surgical History:    Past Surgical History:   Procedure Laterality Date    COLONOSCOPY      many many years ago     TOOTH EXTRACTION      yrs ago    UPPER GASTROINTESTINAL ENDOSCOPY N/A 5/16/2018    EGD BIOPSY performed by Frankie Washington MD at [facility]  5/16/2018    EGD DILATION SAVORY performed by Janet Goldberg MD at [address] [place] [address]  2018    EGD POLYP SNARE performed by Janet Goldberg MD at [address] [place] [address]  2018    -javier,dilation,gastric polyp       Social History:   Social History     Tobacco Use    Smoking status: Former Smoker     Packs/day: 0.00     Years: 50.00     Pack years: 0.00     Quit date: 2019     Years since quittin.2    Smokeless tobacco: Never Used    Tobacco comment: on patches   Substance Use Topics    Alcohol use: No       Family History:   Family History   Problem Relation Age of Onset    Heart Disease Mother     Ovarian Cancer Mother     Heart Disease Father     Stroke Father     Other Sister         rheumatic fever         Review of Systems:  Constitutional: negative for fever or chills  Eyes: negative for visual disturbance   ENT: negative for sore throat or nasal congestion  Respiratory: negative for cough, shortness of breath and sputum  Cardiovascular: negative for chest pain,pnd,claudication or palpitations  Gastrointestinal: negative for abd pain, derrick,nausea, vomiting, diarrhea or constipation  Genitourinary: negative for dysuria,,hematuria urgency or frequency  Musculoskeletal:negative for arthralgias, muscle weakness and stiff joints   Integument/breast: negative for skin rash or lesions  Neurological: negative for   numbness and tingling. Psych: negative for anxiety, depression, suicidial ideation and suicidal attempt. Objective:  Physical Exam:  /84 (Site: Left Upper Arm, Position: Sitting)   Pulse 72   Resp 18   Wt 257 lb (116.6 kg)   BMI 40.25 kg/m²   GEN:   He is alert and oriented  ENT:    ENT exam normal, no neck nodes or sinus tenderness  NECK:   neck supple and non tender without mass, no thyromegaly or thyroid nodules, no cervical lymphadenopathy  EYES:   No gross abnormalities.   CVS:     CVS exam BP noted to be well controlled today in office, S1, S2 normal, no gallop, 2/6 murmur, chest clear, no JVD, no HSM, no edema  PULM:   chest clear, no wheezing, rales, normal symmetric air entry, no tachypnea, retractions or cyanosis  ABD:   Abdomen soft, non-tender. BS normal. No masses,  No organomegaly  MUSC: no joint tenderness, deformity or swelling  Skin : no  rash or lesions  EXT: {EXTREMITIES EXAM:30576::\"Extremities: + 2 pedal pulses, no edema or calf tenderness, and warm to touch. FEET:  no open sores are present bilaterally  NEURO: monofilament normal bilaterally DTR -diminished        Diagnostic Data:  Lab Results   Component Value Date    GLUCOSE 136 (H) 05/19/2020    LABA1C 6.0 12/15/2020    BUN 14 05/19/2020     05/19/2020    K 4.0 05/19/2020    CALCIUM 9.9 05/19/2020     05/19/2020    CO2 26 05/19/2020       Assessment:  1. Type 2 diabetes mellitus without complication, with long-term current use of insulin ([facility])      Plan   Diagnosis Orders   1. Type 2 diabetes mellitus without complication, with long-term current use of insulin (HCC)  POCT glycosylated hemoglobin (Hb A1C)       · Check BS 1 times per day  · Medication change: d/c metformin due to diarrhea  · Encouraged low fat, low sodium and diabetic, 1800 calorie diet.   · Goal for blood pressure control is 120/80  · Recommended regular exercise as tolerated, 5 times per week  · Labs reviewed hba1c 6.5  · Labs ordered:   ascivd risk  Orders Placed This Encounter   Procedures    POCT glycosylated hemoglobin (Hb A1C)       Current Outpatient Medications   Medication Sig Dispense Refill    atorvastatin (LIPITOR) 40 MG tablet Take 1 tablet by mouth daily 90 tablet 0    potassium chloride (KLOR-CON M) 20 MEQ TBCR extended release tablet Take 1 tablet by mouth 3 times daily 270 tablet 0    potassium chloride (KLOR-CON M10) 10 MEQ extended release tablet Take 1 tablet by mouth 3 times daily Take along with 20 meq tablet for a total of 30 meq three times daily 270 tablet 0    glipiZIDE (GLUCOTROL) 5 MG tablet Take 1 tablet by mouth daily 90 tablet 0    busPIRone (BUSPAR) 15 MG tablet Take 15 mg by mouth daily 90 tablet 0    blood glucose test strips (ONETOUCH ULTRA) strip USE A NEW STRIP EACH TIME TO CHECK GLUCOSE BEFORE MEAL(S) AND AT BEDTIME 350 each 0    Lancets (ONETOUCH DELICA PLUS CGMLFY67H) MISC USE 1  TO CHECK GLUCOSE BEFORE MEAL(S) AND EVERY DAY AT BEDTIME 100 each 0    furosemide (LASIX) 20 MG tablet Take 2 tablets by mouth daily 180 tablet 0    pantoprazole (PROTONIX) 40 MG tablet Take 1 tablet by mouth 2 times daily 180 tablet 3    NONFORMULARY Tumeric      benazepril (LOTENSIN) 5 MG tablet Take 1 tablet by mouth daily 90 tablet 3    nitroGLYCERIN (NITROSTAT) 0.4 MG SL tablet Place 1 tablet under the tongue every 5 minutes as needed for Chest pain (Patient not taking: Reported on 12/15/2020) 25 tablet 3    Insulin Pen Needle (PEN NEEDLES 5/16\") 30G X 8 MM MISC 1 each by Does not apply route daily (Patient not taking: Reported on 8/26/2019) 100 each 0    tamsulosin (FLOMAX) 0.4 MG capsule Take 1 capsule by mouth daily (Patient not taking: Reported on 5/19/2020) 90 capsule 0    acetaminophen (TYLENOL) 500 MG tablet Take 1,000 mg by mouth daily as needed       ascorbic acid (VITAMIN C) 500 MG tablet Take 500 mg by mouth daily 3 in am and 2 at hs      Glucosamine-Chondroitin (GLUCOSAMINE CHONDR COMPLEX PO) Take by mouth      Cinnamon 500 MG CAPS Take 2,500 mg by mouth daily.  aspirin 81 MG EC tablet Take 81 mg by mouth daily.  Cyanocobalamin (B-12 PO) Take 1,000 mg by mouth. Pt states to be taking 2500 mg daily at this time.  Omega-3 Fatty Acids (FISH OIL) 1000 MG CAPS Take 3,000 mg by mouth daily.  loratadine (CLARITIN) 10 MG tablet Take 5 mg by mouth daily        No current facility-administered medications for this visit. No follow-ups on file.       Electronically signed by Cynthia Webster MD on 4/9/2021 at 2:34 PM

## 2021-04-09 NOTE — PATIENT INSTRUCTIONS
SURVEY:    You may be receiving a survey from [employer] regarding your visit today. You may get this in the mail, through your MyChart, or in your email. Please complete the survey to enable us to provide the highest quality of care to you and your family. If you cannot score us a very good (5 Stars) on any question, please call the office to discuss how we could of made your experience exceptional.    Thank you!     Dr. Mandi Banerjee, [facility], [address] [place] [address]    Phone: [phone]  Fax: [phone]    Office Hours:   Felecia Polanco, F: 8-5 Wednesday: 9-11

## 2021-05-03 RX ORDER — BUSPIRONE HYDROCHLORIDE 15 MG/1
15 TABLET ORAL DAILY
Qty: 90 TABLET | Refills: 0 | Status: SHIPPED | OUTPATIENT
Start: 2021-05-03 | End: 2021-08-13 | Stop reason: SDUPTHER

## 2021-06-21 DIAGNOSIS — E11.9 TYPE 2 DIABETES MELLITUS WITHOUT COMPLICATION, WITHOUT LONG-TERM CURRENT USE OF INSULIN (HCC): ICD-10-CM

## 2021-06-21 RX ORDER — ATORVASTATIN CALCIUM 40 MG/1
40 TABLET, FILM COATED ORAL DAILY
Qty: 90 TABLET | Refills: 0 | Status: SHIPPED | OUTPATIENT
Start: 2021-06-21 | End: 2021-09-07 | Stop reason: SDUPTHER

## 2021-06-21 RX ORDER — GLIPIZIDE 5 MG/1
5 TABLET ORAL DAILY
Qty: 90 TABLET | Refills: 0 | Status: SHIPPED | OUTPATIENT
Start: 2021-06-21 | End: 2021-09-21 | Stop reason: SDUPTHER

## 2021-06-21 RX ORDER — FUROSEMIDE 20 MG/1
40 TABLET ORAL DAILY
Qty: 180 TABLET | Refills: 0 | Status: SHIPPED | OUTPATIENT
Start: 2021-06-21 | End: 2021-09-07 | Stop reason: SDUPTHER

## 2021-07-06 RX ORDER — POTASSIUM CHLORIDE 1500 MG/1
20 TABLET, FILM COATED, EXTENDED RELEASE ORAL 3 TIMES DAILY
Qty: 270 TABLET | Refills: 0 | Status: SHIPPED | OUTPATIENT
Start: 2021-07-06 | End: 2021-10-21 | Stop reason: SDUPTHER

## 2021-07-06 RX ORDER — POTASSIUM CHLORIDE 750 MG/1
10 TABLET, EXTENDED RELEASE ORAL 3 TIMES DAILY
Qty: 270 TABLET | Refills: 0 | Status: SHIPPED | OUTPATIENT
Start: 2021-07-06 | End: 2021-10-21 | Stop reason: SDUPTHER

## 2021-08-13 RX ORDER — BUSPIRONE HYDROCHLORIDE 15 MG/1
15 TABLET ORAL DAILY
Qty: 90 TABLET | Refills: 0 | Status: SHIPPED | OUTPATIENT
Start: 2021-08-13 | End: 2021-11-09 | Stop reason: SDUPTHER

## 2021-08-26 ENCOUNTER — TELEPHONE (OUTPATIENT)
Dept: PRIMARY CARE CLINIC | Age: 71
End: 2021-08-26

## 2021-08-26 NOTE — TELEPHONE ENCOUNTER
Received a call from patient's wife requesting a new Disability parking placard for this patient. Patient will stop by the office to  the letter.

## 2021-08-26 NOTE — LETTER
[facility]  [address] [place] [address]  Phone: [phone]  Fax: [phone]    Jace Christina MD         August 26, 2021     Patient: Berry George   YOB: 1950   Date of Visit: 8/26/2021       To Whom It May Concern: It is my medical opinion that Rekha Geronimo requires a disability parking placard for the following reasons:  He cannot walk 200 feet without stopping to rest.  Duration of need: 5 years    If you have any questions or concerns, please don't hesitate to call.     Sincerely,        Jace Christina MD

## 2021-09-07 RX ORDER — ATORVASTATIN CALCIUM 40 MG/1
40 TABLET, FILM COATED ORAL DAILY
Qty: 90 TABLET | Refills: 0 | Status: SHIPPED | OUTPATIENT
Start: 2021-09-07 | End: 2022-01-03 | Stop reason: SDUPTHER

## 2021-09-07 RX ORDER — FUROSEMIDE 20 MG/1
40 TABLET ORAL DAILY
Qty: 180 TABLET | Refills: 0 | Status: SHIPPED | OUTPATIENT
Start: 2021-09-07 | End: 2022-04-22

## 2021-09-07 RX ORDER — BENAZEPRIL HYDROCHLORIDE 5 MG/1
5 TABLET, FILM COATED ORAL DAILY
Qty: 90 TABLET | Refills: 3 | Status: SHIPPED | OUTPATIENT
Start: 2021-09-07 | End: 2022-10-10 | Stop reason: SDUPTHER

## 2021-09-21 DIAGNOSIS — E11.9 TYPE 2 DIABETES MELLITUS WITHOUT COMPLICATION, WITHOUT LONG-TERM CURRENT USE OF INSULIN (HCC): ICD-10-CM

## 2021-09-21 RX ORDER — GLIPIZIDE 5 MG/1
5 TABLET ORAL DAILY
Qty: 90 TABLET | Refills: 0 | Status: SHIPPED | OUTPATIENT
Start: 2021-09-21 | End: 2021-12-23 | Stop reason: SDUPTHER

## 2021-10-01 ENCOUNTER — OFFICE VISIT (OUTPATIENT)
Dept: PRIMARY CARE CLINIC | Age: 71
End: 2021-10-01
Payer: MEDICARE

## 2021-10-01 VITALS
DIASTOLIC BLOOD PRESSURE: 76 MMHG | BODY MASS INDEX: 40.66 KG/M2 | WEIGHT: 259.6 LBS | HEART RATE: 63 BPM | SYSTOLIC BLOOD PRESSURE: 116 MMHG

## 2021-10-01 DIAGNOSIS — I50.32 CHRONIC DIASTOLIC HEART FAILURE (HCC): ICD-10-CM

## 2021-10-01 DIAGNOSIS — Z23 NEED FOR INFLUENZA VACCINATION: ICD-10-CM

## 2021-10-01 DIAGNOSIS — I71.20 THORACIC AORTIC ANEURYSM WITHOUT RUPTURE: ICD-10-CM

## 2021-10-01 DIAGNOSIS — I10 ESSENTIAL HYPERTENSION: ICD-10-CM

## 2021-10-01 DIAGNOSIS — E11.9 TYPE 2 DIABETES MELLITUS WITHOUT COMPLICATION, WITHOUT LONG-TERM CURRENT USE OF INSULIN (HCC): Primary | ICD-10-CM

## 2021-10-01 LAB — HBA1C MFR BLD: 7.2 %

## 2021-10-01 PROCEDURE — G8484 FLU IMMUNIZE NO ADMIN: HCPCS | Performed by: FAMILY MEDICINE

## 2021-10-01 PROCEDURE — 3017F COLORECTAL CA SCREEN DOC REV: CPT | Performed by: FAMILY MEDICINE

## 2021-10-01 PROCEDURE — PBSHW INFLUENZA, QUADV, ADJUVANTED, 65 YRS +, IM, PF, PREFILL SYR, 0.5ML (FLUAD): Performed by: FAMILY MEDICINE

## 2021-10-01 PROCEDURE — 1036F TOBACCO NON-USER: CPT | Performed by: FAMILY MEDICINE

## 2021-10-01 PROCEDURE — 1123F ACP DISCUSS/DSCN MKR DOCD: CPT | Performed by: FAMILY MEDICINE

## 2021-10-01 PROCEDURE — G0008 ADMIN INFLUENZA VIRUS VAC: HCPCS | Performed by: FAMILY MEDICINE

## 2021-10-01 PROCEDURE — 2022F DILAT RTA XM EVC RTNOPTHY: CPT | Performed by: FAMILY MEDICINE

## 2021-10-01 PROCEDURE — 99214 OFFICE O/P EST MOD 30 MIN: CPT | Performed by: FAMILY MEDICINE

## 2021-10-01 PROCEDURE — G8417 CALC BMI ABV UP PARAM F/U: HCPCS | Performed by: FAMILY MEDICINE

## 2021-10-01 PROCEDURE — G8427 DOCREV CUR MEDS BY ELIG CLIN: HCPCS | Performed by: FAMILY MEDICINE

## 2021-10-01 PROCEDURE — 3051F HG A1C>EQUAL 7.0%<8.0%: CPT | Performed by: FAMILY MEDICINE

## 2021-10-01 PROCEDURE — PBSHW POCT GLYCOSYLATED HEMOGLOBIN (HGB A1C): Performed by: FAMILY MEDICINE

## 2021-10-01 PROCEDURE — 4040F PNEUMOC VAC/ADMIN/RCVD: CPT | Performed by: FAMILY MEDICINE

## 2021-10-01 PROCEDURE — 83036 HEMOGLOBIN GLYCOSYLATED A1C: CPT | Performed by: FAMILY MEDICINE

## 2021-10-01 ASSESSMENT — PATIENT HEALTH QUESTIONNAIRE - PHQ9
SUM OF ALL RESPONSES TO PHQ9 QUESTIONS 1 & 2: 1
1. LITTLE INTEREST OR PLEASURE IN DOING THINGS: 0
2. FEELING DOWN, DEPRESSED OR HOPELESS: 1
SUM OF ALL RESPONSES TO PHQ QUESTIONS 1-9: 1

## 2021-10-01 NOTE — PROGRESS NOTES
Brianna Stinson is a 66 y.o. male here for routine follow up of diabetes. He has not been checking  his blood glucose levels regularly. He no not really numbness and tingling in the hands and feet. He has not been compliant with diet and exercise. He has been compliant with his medications. He is tolerating medications. Hypertension: Patient here for follow-up of elevated blood pressure. He is exercising and is adherent to low salt diet. Blood pressure sometimes well controlled at home. Cardiac symptoms none. Patient denies chest pain, fatigue and palpitations. Cardiovascular risk factors: advanced age (older than 54 for men, 72 for women), diabetes mellitus, hypertension, male gender and obesity (BMI >= 30 kg/m2). Use of agents associated with hypertension: none. Depression: Patient here for a follow up of depression. Patient states feeling pretty good. He denies current suicidal and homicidal plan or intent. Previous treatment includes BuSpar . He complains of the following side effects from the treatment: none. Allergies:  Sulfa antibiotics and Pcn [penicillins]    Past Medical History:    Past Medical History:   Diagnosis Date    Aortic aneurysm, thoracic ([address] 7/18/2016    Ataxia     Gait ataxia x 1 yr ago (approx. 2010)    CHF (congestive heart failure) ([facility])     COPD (chronic obstructive pulmonary disease) ([address] 4/4/2016    H/O cardiovascular stress test 08/28/2018    Equivocal myocardial perfusion study. There is a small/ moderate perfusion defect of mild intestiy in the inferoseptal and septal regions during stress imaging which is most consistent with ischemia but may be due to artifact. Overall, these results are most consistent with a low risk for CAD.     H/O echocardiogram 06/30/2016    EF >60%. Mild LV hypertrophy with a normal LV cavity size. Aortic leaflet calcification with moderate aortic stenosis with a mean greadient of 20 mmHg.  Mild pulmonary hypertension estimated right ventricular systolic pressure of 37 mmHg. Mild tricuspid regurgitation. Moderate diastolic dysfunction.  H/O echocardiogram 08/28/2018    EF >55%. The LV wall thickness is mildly increased. The pt has a sigmoid interventricular septum without evidence of outflow tract obstruction. Mild to modereate mitral regurg. Mild to tricuspid regurg. Evidence of mild (grade I) diastolic dysfunction is seen. The aortic root is considered to be at the upper limits of normal in size when corrected for body surface area.  Heart failure ([address] 2003    History of echocardiogram 11/20/2014    Global left ventricular systolic function appears to be hyperdynamic with EF of  > 70% Left atrium is mildly dilated (29-33) with left atrial volume index of 31 ml/m2. Moderate regurgitation, mild tricuspid regurgitation. Evidence of moderate (gradeI II )  diastolic dysfunction is seen.  History of stress test 12/30/15    Normal. EF 63%. Low risk for significant CAD.  Hx of cardiac cath 07/21/2016    LMCA: Mild irregularites 10-20%. LAD: Mild irregularities 40-50%. LCx: Mild irregularities 30-40%. RCA: Mild irregularities 30-40%.     Hypertension     Obesity due to excess calories 4/4/2016    DAINA (obstructive sleep apnea) 4/4/2016    Polyneuropathy     Dec 2010    Smoking 4/4/2016    TIA (transient ischemic attack) 2003 & 2004    Type 2 diabetes mellitus without complication [facility])        Past Surgical History:    Past Surgical History:   Procedure Laterality Date    COLONOSCOPY      many many years ago     TOOTH EXTRACTION      yrs ago    UPPER GASTROINTESTINAL ENDOSCOPY N/A 5/16/2018    EGD BIOPSY performed by Vidal Painter MD at [address] [place] [address]  5/16/2018    EGD DILATION SAVORY performed by Vidal Painter MD at [address] [place] [address]  5/16/2018    EGD POLYP SNARE performed by Vidal Painter MD at [facility] ENDOSCOPY  2018    -bx,dilation,gastric polyp       Social History:   Social History     Tobacco Use    Smoking status: Former Smoker     Packs/day: 0.00     Years: 50.00     Pack years: 0.00     Quit date: 2019     Years since quittin.7    Smokeless tobacco: Never Used    Tobacco comment: on patches   Substance Use Topics    Alcohol use: No       Family History:   Family History   Problem Relation Age of Onset    Heart Disease Mother     Ovarian Cancer Mother     Heart Disease Father     Stroke Father     Other Sister         rheumatic fever         Review of Systems:  Constitutional: negative for fever or chills  Eyes: negative for visual disturbance   ENT: negative for sore throat or nasal congestion  Respiratory: negative for cough, shortness of breath and sputum  Cardiovascular: negative for chest pain,pnd,claudication or palpitations  Gastrointestinal: negative for abd pain, derrick,nausea, vomiting, diarrhea or constipation  Genitourinary: negative for dysuria,,hematuria urgency or frequency  Musculoskeletal:negative for arthralgias, muscle weakness and stiff joints ,has chronic back pain  Integument/breast: negative for skin rash or lesions  Neurological: negative for   numbness and tingling. Psych: negative for anxiety, depression well controlled, no suicidial ideation and suicidal attempt. Objective:  Physical Exam:  /76 (Site: Right Upper Arm, Position: Sitting)   Pulse 63   Wt 259 lb 9.6 oz (117.8 kg)   BMI 40.66 kg/m²   GEN:   He is alert and oriented  ENT:    ENT exam normal, no neck nodes or sinus tenderness  NECK:   neck supple and non tender without mass, no thyromegaly or thyroid nodules, no cervical lymphadenopathy  EYES:   No gross abnormalities.   CVS:     CVS exam BP noted to be well controlled today in office, S1, S2 normal, no gallop, no murmur, chest clear, no JVD, no HSM, no edema  PULM:   chest clear, no wheezing, rales, normal symmetric air entry, no tachypnea, retractions or cyanosis  ABD:   exam deferred  MUSC: has deformed non tender joints  Skin : no  rash or lesions  EXT: {EXTREMITIES EXAM:20816::\"Extremities: + 2 pedal pulses, no edema or calf tenderness, and warm to touch. FEET:  no open sores are present bilaterally  NEURO: monofilament normal bilaterally, DTR -normal        Diagnostic Data:  Lab Results   Component Value Date    GLUCOSE 136 (H) 05/19/2020    LABA1C 6.5 04/09/2021    BUN 14 05/19/2020     05/19/2020    K 4.0 05/19/2020    CALCIUM 9.9 05/19/2020     05/19/2020    CO2 26 05/19/2020       Assessment:  1. Type 2 diabetes mellitus without complication, without long-term current use of insulin ([facility]    2. Chronic diastolic heart failure ([facility]    3. Essential hypertension      Plan   Diagnosis Orders   1. Type 2 diabetes mellitus without complication, without long-term current use of insulin (HCC) - well controlled with glipizide    2. Chronic diastolic heart failure (HCC) - stable with lotensin,lasix    3. Essential hypertension -well controlled with lotensin        · Check BS 1 times per day  · Medication change: none  · Encouraged low fat and diabetic, 1800 calorie diet. · Goal for blood pressure control is 120/80  · Recommended regular exercise as tolerated, 5 times per week  · Labs reviewed -hba1c  · Labs ordered: bmp,ct chest for f/u on thoracic aneurism  · Flu vaccine  ascivd risk  No orders of the defined types were placed in this encounter.       Current Outpatient Medications   Medication Sig Dispense Refill    glipiZIDE (GLUCOTROL) 5 MG tablet Take 1 tablet by mouth daily 90 tablet 0    benazepril (LOTENSIN) 5 MG tablet Take 1 tablet by mouth daily 90 tablet 3    atorvastatin (LIPITOR) 40 MG tablet Take 1 tablet by mouth daily 90 tablet 0    furosemide (LASIX) 20 MG tablet Take 2 tablets by mouth daily 180 tablet 0    busPIRone (BUSPAR) 15 MG tablet Take 15 mg by mouth daily 90 tablet 0    potassium chloride (KLOR-CON M) 20 MEQ TBCR extended release tablet Take 1 tablet by mouth 3 times daily 270 tablet 0    potassium chloride (KLOR-CON M10) 10 MEQ extended release tablet Take 1 tablet by mouth 3 times daily Take along with 20 meq tablet for a total of 30 meq three times daily 270 tablet 0    blood glucose test strips (ONETOUCH ULTRA) strip USE A NEW STRIP EACH TIME TO CHECK GLUCOSE BEFORE MEAL(S) AND AT BEDTIME 350 each 0    pantoprazole (PROTONIX) 40 MG tablet Take 1 tablet by mouth 2 times daily 180 tablet 3    NONFORMULARY Tumeric      acetaminophen (TYLENOL) 500 MG tablet Take 1,000 mg by mouth daily as needed       ascorbic acid (VITAMIN C) 500 MG tablet Take 500 mg by mouth daily 3 in am and 2 at hs      Glucosamine-Chondroitin (GLUCOSAMINE CHONDR COMPLEX PO) Take by mouth      Cinnamon 500 MG CAPS Take 2,500 mg by mouth daily.  aspirin 81 MG EC tablet Take 81 mg by mouth daily.  Cyanocobalamin (B-12 PO) Take 1,000 mg by mouth. Pt states to be taking 2500 mg daily at this time.  Omega-3 Fatty Acids (FISH OIL) 1000 MG CAPS Take 3,000 mg by mouth daily.  loratadine (CLARITIN) 10 MG tablet Take 5 mg by mouth daily       Lancets (ONETOUCH DELICA PLUS [id number]) MISC USE 1  TO CHECK GLUCOSE BEFORE MEAL(S) AND EVERY DAY AT BEDTIME (Patient not taking: Reported on 10/1/2021) 100 each 0    nitroGLYCERIN (NITROSTAT) 0.4 MG SL tablet Place 1 tablet under the tongue every 5 minutes as needed for Chest pain (Patient not taking: Reported on 12/15/2020) 25 tablet 3    Insulin Pen Needle (PEN NEEDLES 5/16\") 30G X 8 MM MISC 1 each by Does not apply route daily (Patient not taking: Reported on 8/26/2019) 100 each 0    tamsulosin (FLOMAX) 0.4 MG capsule Take 1 capsule by mouth daily (Patient not taking: Reported on 5/19/2020) 90 capsule 0     No current facility-administered medications for this visit. No follow-ups on file.       Electronically signed by Magen Saenz MD on

## 2021-10-21 RX ORDER — POTASSIUM CHLORIDE 1500 MG/1
20 TABLET, FILM COATED, EXTENDED RELEASE ORAL 3 TIMES DAILY
Qty: 270 TABLET | Refills: 0 | Status: SHIPPED | OUTPATIENT
Start: 2021-10-21 | End: 2022-02-17 | Stop reason: SDUPTHER

## 2021-10-21 RX ORDER — POTASSIUM CHLORIDE 750 MG/1
10 TABLET, EXTENDED RELEASE ORAL 3 TIMES DAILY
Qty: 270 TABLET | Refills: 0 | Status: SHIPPED | OUTPATIENT
Start: 2021-10-21 | End: 2022-02-17 | Stop reason: SDUPTHER

## 2021-10-28 DIAGNOSIS — K21.9 GASTROESOPHAGEAL REFLUX DISEASE: ICD-10-CM

## 2021-10-28 RX ORDER — PANTOPRAZOLE SODIUM 40 MG/1
40 TABLET, DELAYED RELEASE ORAL 2 TIMES DAILY
Qty: 180 TABLET | Refills: 3 | Status: SHIPPED | OUTPATIENT
Start: 2021-10-28 | End: 2022-02-01 | Stop reason: SDUPTHER

## 2021-11-02 ENCOUNTER — HOSPITAL ENCOUNTER (OUTPATIENT)
Age: 71
End: 2021-11-02
Payer: MEDICARE

## 2021-11-03 ENCOUNTER — APPOINTMENT (OUTPATIENT)
Dept: CT IMAGING | Age: 71
End: 2021-11-03
Payer: MEDICARE

## 2021-11-09 RX ORDER — BUSPIRONE HYDROCHLORIDE 15 MG/1
15 TABLET ORAL DAILY
Qty: 90 TABLET | Refills: 0 | Status: SHIPPED | OUTPATIENT
Start: 2021-11-09 | End: 2022-02-17 | Stop reason: SDUPTHER

## 2021-11-15 ENCOUNTER — HOSPITAL ENCOUNTER (OUTPATIENT)
Dept: CT IMAGING | Age: 71
Discharge: HOME | End: 2021-11-17
Payer: MEDICARE

## 2021-11-15 ENCOUNTER — HOSPITAL ENCOUNTER (OUTPATIENT)
Age: 71
Discharge: HOME | End: 2021-11-15
Payer: MEDICARE

## 2021-11-15 DIAGNOSIS — I10 ESSENTIAL HYPERTENSION: ICD-10-CM

## 2021-11-15 DIAGNOSIS — I71.20 THORACIC AORTIC ANEURYSM WITHOUT RUPTURE: ICD-10-CM

## 2021-11-15 DIAGNOSIS — E11.9 TYPE 2 DIABETES MELLITUS WITHOUT COMPLICATION, WITHOUT LONG-TERM CURRENT USE OF INSULIN (HCC): ICD-10-CM

## 2021-11-15 LAB
ANION GAP SERPL CALCULATED.3IONS-SCNC: 10 MMOL/L (ref 9–17)
BUN BLDV-MCNC: 14 MG/DL (ref 8–23)
BUN/CREAT BLD: 20 (ref 9–20)
CALCIUM SERPL-MCNC: 9.1 MG/DL (ref 8.6–10.4)
CHLORIDE BLD-SCNC: 107 MMOL/L (ref 98–107)
CO2: 26 MMOL/L (ref 20–31)
CREAT SERPL-MCNC: 0.69 MG/DL (ref 0.7–1.2)
GFR AFRICAN AMERICAN: >60 ML/MIN
GFR NON-AFRICAN AMERICAN: >60 ML/MIN
GFR SERPL CREATININE-BSD FRML MDRD: ABNORMAL ML/MIN/{1.73_M2}
GFR SERPL CREATININE-BSD FRML MDRD: ABNORMAL ML/MIN/{1.73_M2}
GLUCOSE BLD-MCNC: 159 MG/DL (ref 70–99)
POTASSIUM SERPL-SCNC: 4.2 MMOL/L (ref 3.7–5.3)
SODIUM BLD-SCNC: 143 MMOL/L (ref 135–144)

## 2021-11-15 PROCEDURE — 80048 BASIC METABOLIC PNL TOTAL CA: CPT

## 2021-11-15 PROCEDURE — 36415 COLL VENOUS BLD VENIPUNCTURE: CPT

## 2021-12-02 ENCOUNTER — HOSPITAL ENCOUNTER (OUTPATIENT)
Dept: CT IMAGING | Age: 71
Discharge: HOME | End: 2021-12-04
Payer: MEDICARE

## 2021-12-02 ENCOUNTER — HOSPITAL ENCOUNTER (OUTPATIENT)
Age: 71
Discharge: HOME | End: 2021-12-02
Payer: MEDICARE

## 2021-12-02 DIAGNOSIS — I71.20 THORACIC AORTIC ANEURYSM WITHOUT RUPTURE: Primary | ICD-10-CM

## 2021-12-02 DIAGNOSIS — I71.20 THORACIC AORTIC ANEURYSM WITHOUT RUPTURE: ICD-10-CM

## 2021-12-02 LAB
BUN BLDV-MCNC: 13 MG/DL (ref 8–23)
CREAT SERPL-MCNC: 0.73 MG/DL (ref 0.7–1.2)
GFR AFRICAN AMERICAN: >60 ML/MIN
GFR NON-AFRICAN AMERICAN: >60 ML/MIN
GFR SERPL CREATININE-BSD FRML MDRD: NORMAL ML/MIN/{1.73_M2}
GFR SERPL CREATININE-BSD FRML MDRD: NORMAL ML/MIN/{1.73_M2}

## 2021-12-02 PROCEDURE — 71275 CT ANGIOGRAPHY CHEST: CPT

## 2021-12-02 PROCEDURE — 84520 ASSAY OF UREA NITROGEN: CPT

## 2021-12-02 PROCEDURE — 36415 COLL VENOUS BLD VENIPUNCTURE: CPT

## 2021-12-02 PROCEDURE — 82565 ASSAY OF CREATININE: CPT

## 2021-12-02 PROCEDURE — 6360000004 HC RX CONTRAST MEDICATION: Performed by: FAMILY MEDICINE

## 2021-12-02 RX ADMIN — IOPAMIDOL 75 ML: 755 INJECTION, SOLUTION INTRAVENOUS at 16:32

## 2021-12-23 DIAGNOSIS — E11.9 TYPE 2 DIABETES MELLITUS WITHOUT COMPLICATION, WITHOUT LONG-TERM CURRENT USE OF INSULIN (HCC): ICD-10-CM

## 2021-12-23 RX ORDER — GLIPIZIDE 5 MG/1
5 TABLET ORAL DAILY
Qty: 90 TABLET | Refills: 0 | Status: SHIPPED | OUTPATIENT
Start: 2021-12-23 | End: 2022-03-22 | Stop reason: SDUPTHER

## 2022-01-03 RX ORDER — ATORVASTATIN CALCIUM 40 MG/1
40 TABLET, FILM COATED ORAL DAILY
Qty: 90 TABLET | Refills: 0 | Status: SHIPPED | OUTPATIENT
Start: 2022-01-03 | End: 2022-04-22 | Stop reason: SDUPTHER

## 2022-01-20 ENCOUNTER — OFFICE VISIT (OUTPATIENT)
Dept: PRIMARY CARE CLINIC | Age: 72
End: 2022-01-20
Payer: MEDICARE

## 2022-01-20 VITALS
DIASTOLIC BLOOD PRESSURE: 78 MMHG | BODY MASS INDEX: 38.95 KG/M2 | RESPIRATION RATE: 18 BRPM | WEIGHT: 257 LBS | SYSTOLIC BLOOD PRESSURE: 132 MMHG | HEIGHT: 68 IN | HEART RATE: 93 BPM

## 2022-01-20 DIAGNOSIS — Z00.00 ROUTINE GENERAL MEDICAL EXAMINATION AT A HEALTH CARE FACILITY: ICD-10-CM

## 2022-01-20 DIAGNOSIS — M79.605 BILATERAL LEG PAIN: ICD-10-CM

## 2022-01-20 DIAGNOSIS — E66.01 SEVERE OBESITY (BMI 35.0-35.9 WITH COMORBIDITY) (HCC): ICD-10-CM

## 2022-01-20 DIAGNOSIS — M54.50 ACUTE LOW BACK PAIN, UNSPECIFIED BACK PAIN LATERALITY, UNSPECIFIED WHETHER SCIATICA PRESENT: ICD-10-CM

## 2022-01-20 DIAGNOSIS — M79.604 BILATERAL LEG PAIN: ICD-10-CM

## 2022-01-20 DIAGNOSIS — I50.32 CHRONIC DIASTOLIC HEART FAILURE (HCC): ICD-10-CM

## 2022-01-20 DIAGNOSIS — J44.9 CHRONIC OBSTRUCTIVE PULMONARY DISEASE, UNSPECIFIED COPD TYPE (HCC): ICD-10-CM

## 2022-01-20 DIAGNOSIS — Z00.00 ENCOUNTER FOR MEDICARE ANNUAL WELLNESS EXAM: Primary | ICD-10-CM

## 2022-01-20 DIAGNOSIS — I71.20 THORACIC AORTIC ANEURYSM WITHOUT RUPTURE: ICD-10-CM

## 2022-01-20 DIAGNOSIS — E11.9 TYPE 2 DIABETES MELLITUS WITHOUT COMPLICATION, WITHOUT LONG-TERM CURRENT USE OF INSULIN (HCC): ICD-10-CM

## 2022-01-20 PROCEDURE — 3046F HEMOGLOBIN A1C LEVEL >9.0%: CPT | Performed by: FAMILY MEDICINE

## 2022-01-20 PROCEDURE — G8484 FLU IMMUNIZE NO ADMIN: HCPCS | Performed by: FAMILY MEDICINE

## 2022-01-20 PROCEDURE — 3017F COLORECTAL CA SCREEN DOC REV: CPT | Performed by: FAMILY MEDICINE

## 2022-01-20 PROCEDURE — G0439 PPPS, SUBSEQ VISIT: HCPCS | Performed by: FAMILY MEDICINE

## 2022-01-20 PROCEDURE — 1123F ACP DISCUSS/DSCN MKR DOCD: CPT | Performed by: FAMILY MEDICINE

## 2022-01-20 PROCEDURE — 4040F PNEUMOC VAC/ADMIN/RCVD: CPT | Performed by: FAMILY MEDICINE

## 2022-01-20 SDOH — ECONOMIC STABILITY: FOOD INSECURITY: WITHIN THE PAST 12 MONTHS, THE FOOD YOU BOUGHT JUST DIDN'T LAST AND YOU DIDN'T HAVE MONEY TO GET MORE.: NEVER TRUE

## 2022-01-20 SDOH — ECONOMIC STABILITY: TRANSPORTATION INSECURITY
IN THE PAST 12 MONTHS, HAS THE LACK OF TRANSPORTATION KEPT YOU FROM MEDICAL APPOINTMENTS OR FROM GETTING MEDICATIONS?: NO

## 2022-01-20 SDOH — ECONOMIC STABILITY: TRANSPORTATION INSECURITY
IN THE PAST 12 MONTHS, HAS LACK OF TRANSPORTATION KEPT YOU FROM MEETINGS, WORK, OR FROM GETTING THINGS NEEDED FOR DAILY LIVING?: NO

## 2022-01-20 SDOH — ECONOMIC STABILITY: FOOD INSECURITY: WITHIN THE PAST 12 MONTHS, YOU WORRIED THAT YOUR FOOD WOULD RUN OUT BEFORE YOU GOT MONEY TO BUY MORE.: NEVER TRUE

## 2022-01-20 ASSESSMENT — PATIENT HEALTH QUESTIONNAIRE - PHQ9
8. MOVING OR SPEAKING SO SLOWLY THAT OTHER PEOPLE COULD HAVE NOTICED. OR THE OPPOSITE, BEING SO FIGETY OR RESTLESS THAT YOU HAVE BEEN MOVING AROUND A LOT MORE THAN USUAL: 1
1. LITTLE INTEREST OR PLEASURE IN DOING THINGS: 1
3. TROUBLE FALLING OR STAYING ASLEEP: 0
9. THOUGHTS THAT YOU WOULD BE BETTER OFF DEAD, OR OF HURTING YOURSELF: 0
SUM OF ALL RESPONSES TO PHQ9 QUESTIONS 1 & 2: 2
SUM OF ALL RESPONSES TO PHQ QUESTIONS 1-9: 4
SUM OF ALL RESPONSES TO PHQ QUESTIONS 1-9: 4
2. FEELING DOWN, DEPRESSED OR HOPELESS: 1
6. FEELING BAD ABOUT YOURSELF - OR THAT YOU ARE A FAILURE OR HAVE LET YOURSELF OR YOUR FAMILY DOWN: 1
10. IF YOU CHECKED OFF ANY PROBLEMS, HOW DIFFICULT HAVE THESE PROBLEMS MADE IT FOR YOU TO DO YOUR WORK, TAKE CARE OF THINGS AT HOME, OR GET ALONG WITH OTHER PEOPLE: 0
5. POOR APPETITE OR OVEREATING: 0
SUM OF ALL RESPONSES TO PHQ QUESTIONS 1-9: 4
7. TROUBLE CONCENTRATING ON THINGS, SUCH AS READING THE NEWSPAPER OR WATCHING TELEVISION: 0
4. FEELING TIRED OR HAVING LITTLE ENERGY: 0
SUM OF ALL RESPONSES TO PHQ QUESTIONS 1-9: 4

## 2022-01-20 ASSESSMENT — SOCIAL DETERMINANTS OF HEALTH (SDOH): HOW HARD IS IT FOR YOU TO PAY FOR THE VERY BASICS LIKE FOOD, HOUSING, MEDICAL CARE, AND HEATING?: SOMEWHAT HARD

## 2022-01-20 NOTE — PROGRESS NOTES
Patient has no concerns at this time. Wants to know if he can have any medicine for his legs? Medicare Annual Wellness Visit  Name: Mirna Martines Date: 2022   MRN: [id number] Sex: Male   Age: 66 y.o. Ethnicity: Non- / Non    : 1950 Race: White (non-)      Aviva Murphy is here for Medicare AWV    Screenings for behavioral, psychosocial and functional/safety risks, and cognitive dysfunction are all negative except as indicated below. These results, as well as other patient data from the [address] form, are documented in Flowsheets linked to this Encounter. Allergies   Allergen Reactions    Sulfa Antibiotics     Pcn [Penicillins] Rash         Prior to Visit Medications    Medication Sig Taking?  Authorizing Provider   atorvastatin (LIPITOR) 40 MG tablet Take 1 tablet by mouth daily Yes Kala Lucia MD   glipiZIDE (GLUCOTROL) 5 MG tablet Take 1 tablet by mouth daily Yes Kala Lucia MD   busPIRone (BUSPAR) 15 MG tablet Take 15 mg by mouth daily Yes Kala Lucia MD   pantoprazole (PROTONIX) 40 MG tablet Take 1 tablet by mouth 2 times daily Yes Kala Lucia MD   potassium chloride (KLOR-CON M) 20 MEQ TBCR extended release tablet Take 1 tablet by mouth 3 times daily Yes Kala Lucia MD   potassium chloride (KLOR-CON M10) 10 MEQ extended release tablet Take 1 tablet by mouth 3 times daily Take along with 20 meq tablet for a total of 30 meq three times daily Yes Kala Lucia MD   benazepril (LOTENSIN) 5 MG tablet Take 1 tablet by mouth daily Yes Kala Lucia MD   furosemide (LASIX) 20 MG tablet Take 2 tablets by mouth daily Yes Kala Lucia MD   blood glucose test strips (ONETOUCH ULTRA) strip USE A NEW STRIP EACH TIME TO CHECK GLUCOSE BEFORE MEAL(S) AND AT BEDTIME Yes Kala Lucia MD   NONFORMULARY Tumeric Yes Historical Provider, MD   nitroGLYCERIN (NITROSTAT) 0.4 MG SL tablet Place 1 tablet under the tongue every 5 minutes as needed for Chest pain Yes Zeb Weber MD   acetaminophen (TYLENOL) 500 MG tablet Take 1,000 mg by mouth daily as needed  Yes Historical Provider, MD   ascorbic acid (VITAMIN C) 500 MG tablet Take 500 mg by mouth daily 3 in am and 2 at hs Yes Historical Provider, MD   Glucosamine-Chondroitin (GLUCOSAMINE CHONDR COMPLEX PO) Take by mouth Yes Historical Provider, MD   Cinnamon 500 MG CAPS Take 2,500 mg by mouth daily. Yes Historical Provider, MD   aspirin 81 MG EC tablet Take 81 mg by mouth daily. Yes Historical Provider, MD   Cyanocobalamin (B-12 PO) Take 1,000 mg by mouth. Pt states to be taking 2500 mg daily at this time. Yes Historical Provider, MD   Omega-3 Fatty Acids (FISH OIL) 1000 MG CAPS Take 3,000 mg by mouth daily. Yes Historical Provider, MD   loratadine (CLARITIN) 10 MG tablet Take 5 mg by mouth daily  Yes Historical Provider, MD   Lancets ([address]) MISC USE 1  TO CHECK GLUCOSE BEFORE MEAL(S) AND EVERY DAY AT BEDTIME  Patient not taking: Reported on 10/1/2021  Danika Kellogg MD   Insulin Pen Needle (PEN NEEDLES 5/16\") 30G X 8 MM MISC 1 each by Does not apply route daily  Patient not taking: Reported on 8/26/2019  Danika Kellogg MD   tamsulosin (FLOMAX) 0.4 MG capsule Take 1 capsule by mouth daily  Patient not taking: Reported on 1/20/2022  Danika Kellogg MD         Past Medical History:   Diagnosis Date    Aortic aneurysm, thoracic ([facility] 7/18/2016    Ataxia     Gait ataxia x 1 yr ago (approx. 2010)    CHF (congestive heart failure) (HCC)     COPD (chronic obstructive pulmonary disease) ([facility] 4/4/2016    H/O cardiovascular stress test 08/28/2018    Equivocal myocardial perfusion study. There is a small/ moderate perfusion defect of mild intestiy in the inferoseptal and septal regions during stress imaging which is most consistent with ischemia but may be due to artifact.  Overall, these results are most consistent with a low risk for CAD.     H/O echocardiogram 06/30/2016    EF >60%. Mild LV hypertrophy with a normal LV cavity size. Aortic leaflet calcification with moderate aortic stenosis with a mean greadient of 20 mmHg. Mild pulmonary hypertension estimated right ventricular systolic pressure of 37 mmHg. Mild tricuspid regurgitation. Moderate diastolic dysfunction.  H/O echocardiogram 08/28/2018    EF >55%. The LV wall thickness is mildly increased. The pt has a sigmoid interventricular septum without evidence of outflow tract obstruction. Mild to modereate mitral regurg. Mild to tricuspid regurg. Evidence of mild (grade I) diastolic dysfunction is seen. The aortic root is considered to be at the upper limits of normal in size when corrected for body surface area.  Heart failure ([address] 2003    History of echocardiogram 11/20/2014    Global left ventricular systolic function appears to be hyperdynamic with EF of  > 70% Left atrium is mildly dilated (29-33) with left atrial volume index of 31 ml/m2. Moderate regurgitation, mild tricuspid regurgitation. Evidence of moderate (gradeI II )  diastolic dysfunction is seen.  History of stress test 12/30/15    Normal. EF 63%. Low risk for significant CAD.  Hx of cardiac cath 07/21/2016    LMCA: Mild irregularites 10-20%. LAD: Mild irregularities 40-50%. LCx: Mild irregularities 30-40%. RCA: Mild irregularities 30-40%.     Hypertension     Obesity due to excess calories 4/4/2016    DAINA (obstructive sleep apnea) 4/4/2016    Polyneuropathy     Dec 2010    Smoking 4/4/2016    TIA (transient ischemic attack) 2003 & 2004    Type 2 diabetes mellitus without complication [facility])        Past Surgical History:   Procedure Laterality Date    COLONOSCOPY      many many years ago     TOOTH EXTRACTION      yrs ago    UPPER GASTROINTESTINAL ENDOSCOPY N/A 5/16/2018    EGD BIOPSY performed by Betzy De La Garza MD at [facility]  5/16/2018    EGD DILATION FREEMAN performed by Isra Hanson MD at [place] [address]  5/16/2018    EGD POLYP SNARE performed by Isra Hanson MD at [place] [address]  05/16/2018    -bx,dilation,gastric polyp         Family History   Problem Relation Age of Onset    Heart Disease Mother     Ovarian Cancer Mother     Heart Disease Father     Stroke Father     Other Sister         rheumatic fever       CareTeam (Including outside providers/suppliers regularly involved in providing care):   Patient Care Team:  Gisele Ormond, MD as PCP - Codi Stanley MD as PCP - [facility]  Shelton Negro RN as     Wt Readings from Last 3 Encounters:   01/20/22 257 lb (116.6 kg)   10/01/21 259 lb 9.6 oz (117.8 kg)   04/09/21 257 lb (116.6 kg)     Vitals:    01/20/22 1553   BP: 132/78   Site: Left Upper Arm   Position: Sitting   Pulse: 93   Resp: 18   Weight: 257 lb (116.6 kg)   Height: 5' 8\" (1.727 m)     Body mass index is 39.08 kg/m². Based upon direct observation of the patient, evaluation of cognition reveals recent and remote memory intact.     General Appearance: alert and oriented to person, place and time, well developed and well- nourished, in no acute distress,obese  Skin: warm and dry, no rash or erythema  Head: normocephalic and atraumatic  Eyes: pupils equal, round, and reactive to light, extraocular eye movements intact, conjunctivae normal  ENT: tympanic membrane, external ear and ear canal normal bilaterally, nose without deformity, nasal mucosa and turbinates normal without polyps  Neck: supple and non-tender without mass, no thyromegaly or thyroid nodules, no cervical lymphadenopathy  Pulmonary/Chest: clear to auscultation bilaterally- no wheezes, rales or rhonchi, normal air movement, no respiratory distress  Cardiovascular: normal rate, regular rhythm, normal S1 and S2, 2/6 murmur, rubs, clicks, or gallops, distal pulses intact, no carotid bruits, has leg edema  Abdomen: soft, non-tender, non-distended, normal bowel sounds, no masses or organomegaly  Extremities: no cyanosis, clubbing , has bilat leg  edema  Musculoskeletal: normal range of motion, no joint swelling, deformity or tenderness  Neurologic: reflexes normal and symmetric, no cranial nerve deficit, slow gait, coordination and speech normal    Patient's complete Health Risk Assessment and screening values have been reviewed and are found in Flowsheets. The following problems were reviewed today and where indicated follow up appointments were made and/or referrals ordered. Positive Risk Factor Screenings with Interventions:              General Health and ACP:  General  In general, how would you say your health is?: Very Good  In the past 7 days, have you experienced any of the following?  New or Increased Pain, New or Increased Fatigue, Loneliness, Social Isolation, Stress or Anger?: (!) New or Increased Pain (Patient states increased pain)  Do you get the social and emotional support that you need?: Yes  Do you have a Living Will?: (!) No  Advance Directives     Power of  Living Will ACP-Advance Directive ACP-Power of     Not on File Not on File Not on File Not on File      General Health Risk Interventions:  · No Living Will: [address] addressed with patient today    Health Habits/Nutrition:  Health Habits/Nutrition  Do you exercise for at least 20 minutes 2-3 times per week?: Yes  Have you lost any weight without trying in the past 3 months?: No  Do you eat only one meal per day?: (!) Yes  Have you seen the dentist within the past year?: (!) No  Body mass index: (!) 39.07  Health Habits/Nutrition Interventions:  · Inadequate physical activity:  patient agrees to exercise for at least 150 minutes/week    Hearing/Vision:  No exam data present  Hearing/Vision  Do you or your family notice any trouble with your hearing that hasn't been managed with hearing aids?:  (Patient did not answer)  Do you have difficulty driving, watching TV, or doing any of your daily activities because of your eyesight?:  (Patient states yes and no)  Have you had an eye exam within the past year?: (!) No  Hearing/Vision Interventions:  · none    Safety:  Safety  Do you have working smoke detectors?: Yes  Have all throw rugs been removed or fastened?: (!) No  Do you have non-slip mats or surfaces in all bathtubs/showers?: (!) No  Do all of your stairways have a railing or banister?: (!) No  Are your doorways, halls and stairs free of clutter?: (!) No  Do you always fasten your seatbelt when you are in a car?: Yes  Safety Interventions:  · Home safety tips provided    ADL:  ADLs  In the past 7 days, did you need help from others to perform any of the following everyday activities? Eating, dressing, grooming, bathing, toileting, or walking/balance?: (!) Walking/Balance (Patient states walking)  In the past 7 days, did you need help from others to take care of any of the following?  Laundry, housekeeping, banking/finances, shopping, telephone use, food preparation, transportation, or taking medications?: (!) Shopping  ADL Interventions:  · Patient declines any further evaluation/treatment for this issue      Personalized Preventive Plan   Current Health Maintenance Status  Immunization History   Administered Date(s) Administered    CHEYENNEID-19, Ayanna Patterson, Primary or Immunocompromised, PF, 100mcg/0.5mL 02/11/2021, 03/16/2021    Influenza Vaccine, unspecified formulation 09/30/2014, 11/08/2016    Influenza Virus Vaccine 11/02/2015    Influenza, Quadv, IM, PF (6 mo and older Fluzone, Flulaval, Fluarix, and 3 yrs and older Afluria) 12/05/2018, 12/15/2020    Influenza, Quadv, adjuvanted, 65 yrs +, IM, PF (Fluad) 10/01/2021    Influenza, Triv, 3 Years and older, IM (Afluria (5 yrs and older) 12/04/2017    Influenza, Triv, inactivated, subunit, adjuvanted, IM (Fluad 65 yrs and older) 10/03/2019    Pneumococcal Conjugate 13-valent ([id number]) 05/09/2017    Pneumococcal Polysaccharide ([id number]) 11/21/2014        Health Maintenance   Topic Date Due    AAA screen  Never done    Diabetic microalbuminuria test  Never done    Diabetic retinal exam  Never done    DTaP/Tdap/Td vaccine (1 - Tdap) Never done    Colon cancer screen colonoscopy  Never done    Shingles Vaccine (1 of 2) Never done    Diabetic foot exam  11/20/2015    Lipid screen  09/01/2019    COVID-19 Vaccine (3 - Booster for Moderna series) 09/16/2021    Hepatitis C screen  11/08/2026 (Originally 1950)    A1C test (Diabetic or Prediabetic)  10/01/2022    Depression Monitoring  10/01/2022    Potassium monitoring  11/15/2022    Creatinine monitoring  12/02/2022    Flu vaccine  Completed    Pneumococcal 65+ years Vaccine  Completed    Hepatitis A vaccine  Aged Out    Hib vaccine  Aged Out    Meningococcal (ACWY) vaccine  Aged Out     Recommendations for [employer] Due: see orders and patient instructions/AVS.  . Recommended screening schedule for the next 5-10 years is provided to the patient in written form: see Patient Instructions/AVS.    Pino Killian was seen today for medicare awv. Diagnoses and all orders for this visit:    Encounter for Medicare annual wellness exam  Declines colon cancer screening  Chronic obstructive pulmonary disease, unspecified COPD type ([address]  stable  Chronic diastolic heart failure ([address]  Well controlled  Thoracic aortic aneurysm without rupture ([address]  stable  Type 2 diabetes mellitus without complication, without long-term current use of insulin ([address]  Check urine for microalbumin                Advance Care Planning   Advanced Care Planning: Discussed the patients choices for care and treatment in case of a health event that adversely affects decision-making abilities. Also discussed the patients long-term treatment options.  Reviewed with the patient the [facility]ANCIS[facility] Care Power of  Living Will Declaration forms  Reviewed the process of designating a competent adult as an Agent (or -in-fact) that could take make health care decisions for the patient if incompetent. Patient was asked to complete the declaration forms, either acknowledge the forms by a public notary or an eligible witness and provide a signed copy to the practice office.   Time spent (minutes): 15

## 2022-01-20 NOTE — PATIENT INSTRUCTIONS
Personalized Preventive Plan for Efraín Ring - 1/20/2022  Medicare offers a range of preventive health benefits. Some of the tests and screenings are paid in full while other may be subject to a deductible, co-insurance, and/or copay. Some of these benefits include a comprehensive review of your medical history including lifestyle, illnesses that may run in your family, and various assessments and screenings as appropriate. After reviewing your medical record and screening and assessments performed today your provider may have ordered immunizations, labs, imaging, and/or referrals for you. A list of these orders (if applicable) as well as your Preventive Care list are included within your After Visit Summary for your review. Other Preventive Recommendations:    · A preventive eye exam performed by an eye specialist is recommended every 1-2 years to screen for glaucoma; cataracts, macular degeneration, and other eye disorders. · A preventive dental visit is recommended every 6 months. · Try to get at least 150 minutes of exercise per week or 10,000 steps per day on a pedometer . · Order or download the FREE \"Exercise & Physical Activity: Your Everyday Guide\" from [employer]. Call 0-642.436.6406 or search [employer] online. · You need 3089-9831 mg of calcium and 7203-1292 IU of vitamin D per day. It is possible to meet your calcium requirement with diet alone, but a vitamin D supplement is usually necessary to meet this goal.  · When exposed to the sun, use a sunscreen that protects against both UVA and UVB radiation with an SPF of 30 or greater. Reapply every 2 to 3 hours or after sweating, drying off with a towel, or swimming. · Always wear a seat belt when traveling in a car. Always wear a helmet when riding a bicycle or motorcycle.

## 2022-02-01 DIAGNOSIS — K21.9 GASTROESOPHAGEAL REFLUX DISEASE: ICD-10-CM

## 2022-02-01 RX ORDER — PANTOPRAZOLE SODIUM 40 MG/1
40 TABLET, DELAYED RELEASE ORAL 2 TIMES DAILY
Qty: 180 TABLET | Refills: 3 | Status: SHIPPED | OUTPATIENT
Start: 2022-02-01

## 2022-02-17 RX ORDER — POTASSIUM CHLORIDE 1500 MG/1
20 TABLET, FILM COATED, EXTENDED RELEASE ORAL 3 TIMES DAILY
Qty: 270 TABLET | Refills: 0 | Status: SHIPPED | OUTPATIENT
Start: 2022-02-17

## 2022-02-17 RX ORDER — BUSPIRONE HYDROCHLORIDE 15 MG/1
15 TABLET ORAL DAILY
Qty: 90 TABLET | Refills: 0 | Status: SHIPPED | OUTPATIENT
Start: 2022-02-17 | End: 2022-05-23 | Stop reason: SDUPTHER

## 2022-02-17 RX ORDER — POTASSIUM CHLORIDE 750 MG/1
10 TABLET, EXTENDED RELEASE ORAL 3 TIMES DAILY
Qty: 270 TABLET | Refills: 0 | Status: SHIPPED | OUTPATIENT
Start: 2022-02-17 | End: 2022-02-22 | Stop reason: SDUPTHER

## 2022-02-21 ENCOUNTER — NURSE TRIAGE (OUTPATIENT)
Dept: OTHER | Facility: CLINIC | Age: 72
End: 2022-02-21

## 2022-02-21 NOTE — TELEPHONE ENCOUNTER
Received call from Abilio Jackson at [facility] with [employer]. Subjective: Caller states \"Open wounds\"     Current Symptoms: Wounds to the LLE- between the calf and the ankle. \"A couple of\" wounds that are open and \"then some smaller ones scattered around\". Total estimated of 4-5 wounds with largest 1/2 cm large. Skin color \"purply/red\" around the lower calf. LLE swelling from the foot to the calf. Onset: 1 week ago; worsening    Pain Severity: Mild; Constant    Temperature: Denies fever    What has been tried: Tylenol extra strength    Recommended disposition: See HCP within 4 hours (Or PCP triage). Care advice provided, patient verbalizes understanding; denies any other questions or concerns; instructed to call back for any new or worsening symptoms. Writer provided warm transfer to [facility] for 2nd level triage. Attention Provider: Thank you for allowing me to participate in the care of your patient. The patient was connected to triage in response to information provided to the ECC/PSC. Please do not respond through this encounter as the response is not directed to a shared pool.         Reason for Disposition   [1] Looks infected (spreading redness, pus) AND [2] large red area (> 2 in. or 5 cm)    Protocols used: [facility]

## 2022-02-22 ENCOUNTER — OFFICE VISIT (OUTPATIENT)
Dept: PRIMARY CARE CLINIC | Age: 72
End: 2022-02-22
Payer: MEDICARE

## 2022-02-22 VITALS
RESPIRATION RATE: 18 BRPM | BODY MASS INDEX: 39.14 KG/M2 | SYSTOLIC BLOOD PRESSURE: 134 MMHG | DIASTOLIC BLOOD PRESSURE: 86 MMHG | WEIGHT: 257.4 LBS

## 2022-02-22 DIAGNOSIS — S80.812A CAT SCRATCH OF LEFT LOWER LEG, INITIAL ENCOUNTER: Primary | ICD-10-CM
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
## 2022-02-22 PROCEDURE — 3017F COLORECTAL CA SCREEN DOC REV: CPT | Performed by: FAMILY MEDICINE

## 2022-02-22 PROCEDURE — 99211 OFF/OP EST MAY X REQ PHY/QHP: CPT | Performed by: FAMILY MEDICINE

## 2022-02-22 PROCEDURE — G8417 CALC BMI ABV UP PARAM F/U: HCPCS | Performed by: FAMILY MEDICINE

## 2022-02-22 PROCEDURE — 99213 OFFICE O/P EST LOW 20 MIN: CPT | Performed by: FAMILY MEDICINE

## 2022-02-22 PROCEDURE — 1123F ACP DISCUSS/DSCN MKR DOCD: CPT | Performed by: FAMILY MEDICINE

## 2022-02-22 PROCEDURE — G8484 FLU IMMUNIZE NO ADMIN: HCPCS | Performed by: FAMILY MEDICINE

## 2022-02-22 PROCEDURE — 4040F PNEUMOC VAC/ADMIN/RCVD: CPT | Performed by: FAMILY MEDICINE

## 2022-02-22 PROCEDURE — 1036F TOBACCO NON-USER: CPT | Performed by: FAMILY MEDICINE

## 2022-02-22 PROCEDURE — G8427 DOCREV CUR MEDS BY ELIG CLIN: HCPCS | Performed by: FAMILY MEDICINE

## 2022-02-22 RX ORDER — AZITHROMYCIN 250 MG/1
250 TABLET, FILM COATED ORAL SEE ADMIN INSTRUCTIONS
Qty: 6 TABLET | Refills: 0 | Status: SHIPPED | OUTPATIENT
Start: 2022-02-22 | End: 2022-02-27

## 2022-02-22 NOTE — PROGRESS NOTES
Wound Infection and Cellulitis   for 2 weeks  Nature of Onset and Mechanism -  Improving, gradual  Location - Left leg  Characteristics/Radiation/Quality -   Severity (0-10) - 6, worsens up to 10  Aggravating Factors - nothing in particular  Relieving Factors/Treatment tried - peroxide, tylenol - little improvement    Allergies:    Sulfa antibiotics and Pcn [penicillins]    Past Medical History:    Past Medical History:   Diagnosis Date    Aortic aneurysm, thoracic ([facility] 7/18/2016    Ataxia     Gait ataxia x 1 yr ago (approx. 2010)    CHF (congestive heart failure) ([facility])     COPD (chronic obstructive pulmonary disease) ([facility] 4/4/2016    H/O cardiovascular stress test 08/28/2018    Equivocal myocardial perfusion study. There is a small/ moderate perfusion defect of mild intestiy in the inferoseptal and septal regions during stress imaging which is most consistent with ischemia but may be due to artifact. Overall, these results are most consistent with a low risk for CAD.     H/O echocardiogram 06/30/2016    EF >60%. Mild LV hypertrophy with a normal LV cavity size. Aortic leaflet calcification with moderate aortic stenosis with a mean greadient of 20 mmHg. Mild pulmonary hypertension estimated right ventricular systolic pressure of 37 mmHg. Mild tricuspid regurgitation. Moderate diastolic dysfunction.  H/O echocardiogram 08/28/2018    EF >55%. The LV wall thickness is mildly increased. The pt has a sigmoid interventricular septum without evidence of outflow tract obstruction. Mild to modereate mitral regurg. Mild to tricuspid regurg. Evidence of mild (grade I) diastolic dysfunction is seen. The aortic root is considered to be at the upper limits of normal in size when corrected for body surface area.      Heart failure ([address] 2003    History of echocardiogram 11/20/2014    Global left ventricular systolic function appears to be hyperdynamic with EF of  > 70% Left atrium is mildly dilated (29-33) with left atrial volume index of 31 ml/m2. Moderate regurgitation, mild tricuspid regurgitation. Evidence of moderate (gradeI II )  diastolic dysfunction is seen.  History of stress test 12/30/15    Normal. EF 63%. Low risk for significant CAD.  Hx of cardiac cath 07/21/2016    LMCA: Mild irregularites 10-20%. LAD: Mild irregularities 40-50%. LCx: Mild irregularities 30-40%. RCA: Mild irregularities 30-40%.     Hypertension     Obesity due to excess calories 4/4/2016    DAINA (obstructive sleep apnea) 4/4/2016    Polyneuropathy     Dec 2010    Smoking 4/4/2016    TIA (transient ischemic attack) 2003 & 2004    Type 2 diabetes mellitus without complication [facility])        Past Surgical History:    Past Surgical History:   Procedure Laterality Date    COLONOSCOPY      many many years ago     TOOTH EXTRACTION      yrs ago    UPPER GASTROINTESTINAL ENDOSCOPY N/A 5/16/2018    EGD BIOPSY performed by Jose Acosta MD at [facility]  5/16/2018    EGD DILATION SAVORY performed by Jose Acosta MD at [facility]  5/16/2018    EGD POLYP SNARE performed by Jose Acosta MD at [facility]  05/16/2018    -bx,dilation,gastric polyp       Social History:   Social History     Tobacco Use    Smoking status: Former Smoker     Packs/day: 0.00     Years: 50.00     Pack years: 0.00     Quit date: 01/2019     Years since quitting: 3.1    Smokeless tobacco: Never Used    Tobacco comment: on patches   Substance Use Topics    Alcohol use: No       Family History:   Family History   Problem Relation Age of Onset    Heart Disease Mother     Ovarian Cancer Mother     Heart Disease Father     Stroke Father     Other Sister         rheumatic fever       Review of Systems:  Constitutional: negative for fevers or chills  Integument/breast:has scratch marks and open wounds both legs- has cats at home  Neurological: negative for unilateral weakness, numbness or tingling. Objective:  Physical Exam:  GEN:   A & O x3, no apparent distress  EXT: has multiple scratch marks with open wounds lt lower leg and few scratch marks rt lower leg  NEURO: Motor and sensory grossly intact  No inguinal lymphadenopathy    Assessment:  1. Cat scratch of left lower leg, initial encounter          Plan:  No orders of the defined types were placed in this encounter. Return if symptoms worsen or fail to improve.    Orders Placed This Encounter   Medications    azithromycin (ZITHROMAX) 250 MG tablet     Sig: Take 1 tablet by mouth See Admin Instructions for 5 days 500mg on day 1 followed by 250mg on days 2 - 5     Dispense:  6 tablet     Refill:  0   discussed avoidance of cat scratches  Medication directions and side effects discussed    Electronically signed by Mannie Cueva MD on 2/22/2022 at 12:03 PM

## 2022-03-22 DIAGNOSIS — E11.9 TYPE 2 DIABETES MELLITUS WITHOUT COMPLICATION, WITHOUT LONG-TERM CURRENT USE OF INSULIN (HCC): ICD-10-CM

## 2022-03-22 RX ORDER — GLIPIZIDE 5 MG/1
5 TABLET ORAL DAILY
Qty: 90 TABLET | Refills: 0 | Status: SHIPPED | OUTPATIENT
Start: 2022-03-22 | End: 2022-07-07 | Stop reason: SDUPTHER

## 2022-04-22 ENCOUNTER — OFFICE VISIT (OUTPATIENT)
Dept: PRIMARY CARE CLINIC | Age: 72
End: 2022-04-22
Payer: MEDICARE

## 2022-04-22 VITALS
DIASTOLIC BLOOD PRESSURE: 79 MMHG | RESPIRATION RATE: 18 BRPM | WEIGHT: 253.6 LBS | HEART RATE: 84 BPM | BODY MASS INDEX: 38.56 KG/M2 | SYSTOLIC BLOOD PRESSURE: 121 MMHG

## 2022-04-22 DIAGNOSIS — J44.9 CHRONIC OBSTRUCTIVE PULMONARY DISEASE, UNSPECIFIED COPD TYPE (HCC): ICD-10-CM

## 2022-04-22 DIAGNOSIS — I50.32 CHRONIC DIASTOLIC HEART FAILURE (HCC): ICD-10-CM

## 2022-04-22 DIAGNOSIS — E11.40 TYPE 2 DIABETES MELLITUS WITH DIABETIC NEUROPATHY, WITHOUT LONG-TERM CURRENT USE OF INSULIN (HCC): Primary | ICD-10-CM

## 2022-04-22 LAB — HBA1C MFR BLD: 7.1 %

## 2022-04-22 PROCEDURE — 99214 OFFICE O/P EST MOD 30 MIN: CPT | Performed by: FAMILY MEDICINE

## 2022-04-22 PROCEDURE — PBSHW POCT GLYCOSYLATED HEMOGLOBIN (HGB A1C): Performed by: FAMILY MEDICINE

## 2022-04-22 PROCEDURE — 3023F SPIROM DOC REV: CPT | Performed by: FAMILY MEDICINE

## 2022-04-22 PROCEDURE — 3051F HG A1C>EQUAL 7.0%<8.0%: CPT | Performed by: FAMILY MEDICINE

## 2022-04-22 PROCEDURE — 99211 OFF/OP EST MAY X REQ PHY/QHP: CPT | Performed by: FAMILY MEDICINE

## 2022-04-22 PROCEDURE — 3017F COLORECTAL CA SCREEN DOC REV: CPT | Performed by: FAMILY MEDICINE

## 2022-04-22 PROCEDURE — G8427 DOCREV CUR MEDS BY ELIG CLIN: HCPCS | Performed by: FAMILY MEDICINE

## 2022-04-22 PROCEDURE — PBSHW PBB SHADOW CHARGE: Performed by: FAMILY MEDICINE

## 2022-04-22 PROCEDURE — 2022F DILAT RTA XM EVC RTNOPTHY: CPT | Performed by: FAMILY MEDICINE

## 2022-04-22 PROCEDURE — 83036 HEMOGLOBIN GLYCOSYLATED A1C: CPT | Performed by: FAMILY MEDICINE

## 2022-04-22 PROCEDURE — 1036F TOBACCO NON-USER: CPT | Performed by: FAMILY MEDICINE

## 2022-04-22 PROCEDURE — 4040F PNEUMOC VAC/ADMIN/RCVD: CPT | Performed by: FAMILY MEDICINE

## 2022-04-22 PROCEDURE — G8417 CALC BMI ABV UP PARAM F/U: HCPCS | Performed by: FAMILY MEDICINE

## 2022-04-22 PROCEDURE — 1123F ACP DISCUSS/DSCN MKR DOCD: CPT | Performed by: FAMILY MEDICINE

## 2022-04-22 RX ORDER — ATORVASTATIN CALCIUM 40 MG/1
40 TABLET, FILM COATED ORAL DAILY
Qty: 90 TABLET | Refills: 0 | Status: SHIPPED | OUTPATIENT
Start: 2022-04-22 | End: 2022-08-05 | Stop reason: SDUPTHER

## 2022-04-22 RX ORDER — POTASSIUM CHLORIDE 20 MEQ/1
TABLET, EXTENDED RELEASE ORAL
COMMUNITY
Start: 2022-02-17 | End: 2022-05-16 | Stop reason: SDUPTHER

## 2022-04-22 RX ORDER — BLOOD-GLUCOSE METER
1 KIT MISCELLANEOUS DAILY
Qty: 1 KIT | Refills: 0 | Status: SHIPPED | OUTPATIENT
Start: 2022-04-22

## 2022-04-22 ASSESSMENT — PATIENT HEALTH QUESTIONNAIRE - PHQ9
SUM OF ALL RESPONSES TO PHQ QUESTIONS 1-9: 8
1. LITTLE INTEREST OR PLEASURE IN DOING THINGS: 0
3. TROUBLE FALLING OR STAYING ASLEEP: 3
SUM OF ALL RESPONSES TO PHQ QUESTIONS 1-9: 8
10. IF YOU CHECKED OFF ANY PROBLEMS, HOW DIFFICULT HAVE THESE PROBLEMS MADE IT FOR YOU TO DO YOUR WORK, TAKE CARE OF THINGS AT HOME, OR GET ALONG WITH OTHER PEOPLE: 1
SUM OF ALL RESPONSES TO PHQ QUESTIONS 1-9: 8
SUM OF ALL RESPONSES TO PHQ9 QUESTIONS 1 & 2: 1
4. FEELING TIRED OR HAVING LITTLE ENERGY: 1
7. TROUBLE CONCENTRATING ON THINGS, SUCH AS READING THE NEWSPAPER OR WATCHING TELEVISION: 0
5. POOR APPETITE OR OVEREATING: 2
6. FEELING BAD ABOUT YOURSELF - OR THAT YOU ARE A FAILURE OR HAVE LET YOURSELF OR YOUR FAMILY DOWN: 0
9. THOUGHTS THAT YOU WOULD BE BETTER OFF DEAD, OR OF HURTING YOURSELF: 0
SUM OF ALL RESPONSES TO PHQ QUESTIONS 1-9: 8
2. FEELING DOWN, DEPRESSED OR HOPELESS: 1
8. MOVING OR SPEAKING SO SLOWLY THAT OTHER PEOPLE COULD HAVE NOTICED. OR THE OPPOSITE, BEING SO FIGETY OR RESTLESS THAT YOU HAVE BEEN MOVING AROUND A LOT MORE THAN USUAL: 1

## 2022-04-22 NOTE — PATIENT INSTRUCTIONS
SURVEY:    You may be receiving a survey from [employer] regarding your visit today. You may get this in the mail, through your MyChart, or in your email. Please complete the survey to enable us to provide the highest quality of care to you and your family. If you cannot score us a very good (5 Stars) on any question, please call the office to discuss how we could of made your experience exceptional.    Thank you!     Dr. Ryan Xiao, LIBAN Natarajan, RN   Bree Lewis, [address] [place] [address] Clifford Moya    Phone: [phone]  Fax: [phone]    Office Hours:   Willem Arzola [place], F: 8-5 Wednesday: 9-11

## 2022-04-22 NOTE — PROGRESS NOTES
Jose J Ford is a 66 y.o. male here for routine follow up of diabetes. He has not been checking  his blood glucose levels regularly. He denies numbness and tingling in the hands and feet. He has not been mostly compliant with diet and exercise. He has been compliant with his medications. He is tolerating medications. Hypertension: Patient here for follow-up of elevated blood pressure. He is not exercising and is not adherent to low salt diet. Blood pressure is not well controlled at home. Patient does not take his blood pressure at home. Cardiac symptoms chest pain. He states he gets chest pain every once and a while. Patient denies fatigue and palpitations. Cardiovascular risk factors: advanced age (older than 54 for men, 72 for women), diabetes mellitus, hypertension, male gender and obesity (BMI >= 30 kg/m2). Use of agents associated with hypertension: none. Congestive Heart Failure  Patient presents for re-evaluation of congestive heart failure. Patient's current complaints are chest pain. He denies fatigue and palpitations. He states he is compliant most of the time with his medications. He is compliant some of the time with his medications. Depression: Patient here for a follow up of depression. Patient is well controlled with the medication. He denies current suicidal and homicidal plan or intent. Risk factors: none Previous treatment includes BuSpar . He complains of the following side effects from the treatment: none. Patient has been getting diarrhea. He has been taking anti diarrhea medication at home. Allergies:  Sulfa antibiotics and Pcn [penicillins]    Past Medical History:    Past Medical History:   Diagnosis Date    Aortic aneurysm[facility] ([facility] 7/18/2016    Ataxia     Gait ataxia x 1 yr ago (approx.  2010)    [facility]     COPD (chronic obstructive pulmonary disease) ([facility] [address] 4/4/2016    H/O cardiovascular stress test 08/28/2018    Equivocal myocardial perfusion study. There is a small/ moderate perfusion defect of mild intestiy in the inferoseptal and septal regions during stress imaging which is most consistent with ischemia but may be due to artifact. Overall, these results are most consistent with a low risk for CAD.     H/O echocardiogram 06/30/2016    EF >60%. Mild LV hypertrophy with a normal LV cavity size. Aortic leaflet calcification with moderate aortic stenosis with a mean greadient of 20 mmHg. Mild pulmonary hypertension estimated right ventricular systolic pressure of 37 mmHg. Mild tricuspid regurgitation. Moderate diastolic dysfunction.  H/O echocardiogram 08/28/2018    EF >55%. The LV wall thickness is mildly increased. The pt has a sigmoid interventricular septum without evidence of outflow tract obstruction. Mild to modereate mitral regurg. Mild to tricuspid regurg. Evidence of mild (grade I) diastolic dysfunction is seen. The aortic root is considered to be at the upper limits of normal in size when corrected for body surface area.  Heart failure ([address] 2003    History of echocardiogram 11/20/2014    Global left ventricular systolic function appears to be hyperdynamic with EF of  > 70% Left atrium is mildly dilated (29-33) with left atrial volume index of 31 ml/m2. Moderate regurgitation, mild tricuspid regurgitation. Evidence of moderate (gradeI II )  diastolic dysfunction is seen.  History of stress test 12/30/15    Normal. EF 63%. Low risk for significant CAD.  Hx of cardiac cath 07/21/2016    LMCA: Mild irregularites 10-20%. LAD: Mild irregularities 40-50%. LCx: Mild irregularities 30-40%. RCA: Mild irregularities 30-40%.     Hypertension     Obesity due to excess calories 4/4/2016    DAINA (obstructive sleep apnea) 4/4/2016    Polyneuropathy     Dec 2010    Smoking 4/4/2016    TIA (transient ischemic attack) 2003 & 2004    Type 2 diabetes mellitus without complication (HCC)        Past Surgical History:    Past Surgical History:   Procedure Laterality Date    COLONOSCOPY      many many years ago     TOOTH EXTRACTION      yrs ago    UPPER GASTROINTESTINAL ENDOSCOPY N/A 5/16/2018    EGD BIOPSY performed by Viet Hinojosa MD at [address] [place] [address]  5/16/2018    EGD DILATION SAVORY performed by Viet Hinojosa MD at [address] [place] [address]  5/16/2018    EGD POLYP SNARE performed by Viet Hinojosa MD at [address] [place] [address]  05/16/2018    -bx,dilation,gastric polyp       Social History:   Social History     Tobacco Use    Smoking status: Former Smoker     Packs/day: 0.00     Years: 50.00     Pack years: 0.00     Quit date: 01/2019     Years since quitting: 3.3    Smokeless tobacco: Never Used    Tobacco comment: on patches   Substance Use Topics    Alcohol use: No       Family History:   Family History   Problem Relation Age of Onset    Heart Disease Mother     Ovarian Cancer Mother     Heart Disease Father     Stroke Father     Other Sister         rheumatic fever         Review of Systems:  Constitutional: negative for fever or chills  Eyes: negative for visual disturbance   ENT: negative for sore throat or nasal congestion  Respiratory: negative for cough, shortness of breath and sputum  Cardiovascular: negative for chest pain,pnd,claudication or palpitations  Gastrointestinal: negative for abd pain, derrick,nausea, vomiting,  or constipation,has diarrhea with fatty foods  Genitourinary: negative for dysuria,,hematuria urgency or frequency  Musculoskeletal:negative for arthralgias, muscle weakness and stiff joints ,has chronic back pain  Integument/breast: negative for skin rash or lesions  Neurological: negative for   numbness and tingling. Psych: negative for anxiety, depression, suicidial ideation and suicidal attempt.            Objective:  Physical Exam:  /79 (Site: Right Upper Arm, Position: Sitting)   Pulse 84   Resp 18   Wt 253 lb 9.6 oz (115 kg)   BMI 38.56 kg/m²   GEN:   He is alert and oriented  ENT:    ENT exam normal, no neck nodes or sinus tenderness  NECK:   neck supple and non tender without mass, no thyromegaly or thyroid nodules, no cervical lymphadenopathy  EYES:   No gross abnormalities. CVS:     CVS exam BP noted to be well controlled today in office, S1, S2 normal, no gallop, 2/6  murmur,  no JVD, no HSM, no edema  PULM:   chest clear, no wheezing, rales, normal symmetric air entry, no tachypnea, retractions or cyanosis  ABD:   Abdomen soft, non-tender. BS normal. No masses,  No organomegaly  MUSC: no joint tenderness,   Skin : has intertrigo  Axillary region on both side  EXT: {EXTREMITIES EXAM:20816::\"Extremities: + 2 pedal pulses, no edema or calf tenderness, and warm to touch. FEET:  no open sores are present bilaterally  NEURO: monofilament normal bilaterally DTR -diminished bilat        Diagnostic Data:  Lab Results   Component Value Date    GLUCOSE 159 (H) 11/15/2021    LABA1C 7.2 10/01/2021    BUN 13 12/02/2021     11/15/2021    K 4.2 11/15/2021    CALCIUM 9.1 11/15/2021     11/15/2021    CO2 26 11/15/2021       Assessment:  1. Type 2 diabetes mellitus with diabetic neuropathy, without long-term current use of insulin ([address]    2. Chronic diastolic heart failure ([address]    3. Chronic obstructive pulmonary disease, unspecified COPD type ([address]      Plan   Diagnosis Orders   1. Type 2 diabetes mellitus with diabetic neuropathy, without long-term current use of insulin ([address]     2. Chronic diastolic heart failure ([address]     3. Chronic obstructive pulmonary disease, unspecified COPD type (HCC)         · Check BS 1 times per day  ·   · Encouraged low fat, low sodium and diabetic, 1800 calorie diet.   · Goal for blood pressure control is 120/80  · Recommended regular exercise as tolerated, 5 times per week  · Labs reviewed -hba1c  · Nystatin powder bid  ascivd risk  No orders of the defined types were placed in this encounter. Current Outpatient Medications   Medication Sig Dispense Refill    atorvastatin (LIPITOR) 40 MG tablet Take 1 tablet by mouth daily 90 tablet 0    glucose monitoring (FREESTYLE FREEDOM) kit 1 kit by Does not apply route daily 1 kit 0    potassium chloride (KLOR-CON M) 20 MEQ extended release tablet TAKE 1 TABLET BY MOUTH THREE TIMES DAILY      glipiZIDE (GLUCOTROL) 5 MG tablet Take 1 tablet by mouth daily 90 tablet 0    busPIRone (BUSPAR) 15 MG tablet Take 15 mg by mouth daily 90 tablet 0    potassium chloride (KLOR-CON M) 20 MEQ TBCR extended release tablet Take 1 tablet by mouth 3 times daily 270 tablet 0    pantoprazole (PROTONIX) 40 MG tablet Take 1 tablet by mouth 2 times daily 180 tablet 3    benazepril (LOTENSIN) 5 MG tablet Take 1 tablet by mouth daily 90 tablet 3    blood glucose test strips (ONETOUCH ULTRA) strip USE A NEW STRIP EACH TIME TO CHECK GLUCOSE BEFORE MEAL(S) AND AT BEDTIME 350 each 0    NONFORMULARY Tumeric      acetaminophen (TYLENOL) 500 MG tablet Take 1,000 mg by mouth daily as needed       ascorbic acid (VITAMIN C) 500 MG tablet Take 500 mg by mouth daily 3 in am and 2 at hs      Glucosamine-Chondroitin (GLUCOSAMINE CHONDR COMPLEX PO) Take by mouth      Cinnamon 500 MG CAPS Take 2,500 mg by mouth daily.  aspirin 81 MG EC tablet Take 81 mg by mouth daily.  Cyanocobalamin (B-12 PO) Take 1,000 mg by mouth. Pt states to be taking 2500 mg daily at this time.  Omega-3 Fatty Acids (FISH OIL) 1000 MG CAPS Take 3,000 mg by mouth daily.  loratadine (CLARITIN) 10 MG tablet Take 5 mg by mouth daily        No current facility-administered medications for this visit. No follow-ups on file.       Electronically signed by Jessica Benjamin MD on 4/22/2022 at 3:59 PM

## 2022-04-24 RX ORDER — NYSTATIN 100000 [USP'U]/G
POWDER TOPICAL
Qty: 60 G | Refills: 2 | Status: SHIPPED | OUTPATIENT
Start: 2022-04-24

## 2022-04-25 ENCOUNTER — TELEPHONE (OUTPATIENT)
Dept: PRIMARY CARE CLINIC | Age: 72
End: 2022-04-25

## 2022-04-25 DIAGNOSIS — E11.40 TYPE 2 DIABETES MELLITUS WITH DIABETIC NEUROPATHY, WITHOUT LONG-TERM CURRENT USE OF INSULIN (HCC): Primary | ICD-10-CM

## 2022-04-25 RX ORDER — BLOOD-GLUCOSE METER
KIT MISCELLANEOUS
COMMUNITY
End: 2022-04-25 | Stop reason: SDUPTHER

## 2022-04-25 RX ORDER — GLUCOSAMINE HCL/CHONDROITIN SU 500-400 MG
CAPSULE ORAL
Qty: 100 STRIP | Refills: 2 | Status: SHIPPED | OUTPATIENT
Start: 2022-04-25 | End: 2022-04-28 | Stop reason: SDUPTHER

## 2022-04-25 RX ORDER — BLOOD-GLUCOSE METER
1 KIT MISCELLANEOUS ONCE
Qty: 1 KIT | Refills: 0 | Status: SHIPPED | OUTPATIENT
Start: 2022-04-25 | End: 2022-04-28 | Stop reason: SDUPTHER

## 2022-04-25 NOTE — TELEPHONE ENCOUNTER
Received a phone call from Ney Dotson, pharmacist at [facility]. Patient's insurance will only cover accuchek or [employer] blood glucose monitor.

## 2022-04-28 DIAGNOSIS — E11.40 TYPE 2 DIABETES MELLITUS WITH DIABETIC NEUROPATHY, WITHOUT LONG-TERM CURRENT USE OF INSULIN (HCC): ICD-10-CM

## 2022-04-28 RX ORDER — GLUCOSAMINE HCL/CHONDROITIN SU 500-400 MG
CAPSULE ORAL
Qty: 100 STRIP | Refills: 2 | Status: SHIPPED | OUTPATIENT
Start: 2022-04-28

## 2022-05-16 RX ORDER — POTASSIUM CHLORIDE 20 MEQ/1
TABLET, EXTENDED RELEASE ORAL
Qty: 90 TABLET | Refills: 1 | Status: SHIPPED | OUTPATIENT
Start: 2022-05-16 | End: 2022-08-15 | Stop reason: SDUPTHER

## 2022-05-23 RX ORDER — BUSPIRONE HYDROCHLORIDE 15 MG/1
15 TABLET ORAL DAILY
Qty: 90 TABLET | Refills: 0 | Status: SHIPPED | OUTPATIENT
Start: 2022-05-23 | End: 2022-08-28 | Stop reason: SDUPTHER

## 2022-07-07 DIAGNOSIS — E11.9 TYPE 2 DIABETES MELLITUS WITHOUT COMPLICATION, WITHOUT LONG-TERM CURRENT USE OF INSULIN (HCC): ICD-10-CM

## 2022-07-07 RX ORDER — GLIPIZIDE 5 MG/1
5 TABLET ORAL DAILY
Qty: 90 TABLET | Refills: 0 | Status: SHIPPED | OUTPATIENT
Start: 2022-07-07 | End: 2022-10-10 | Stop reason: SDUPTHER

## 2022-07-18 NOTE — PROGRESS NOTES
Attempted PAT phone call; no voicemail set up, unable to leave message. Pt resting in bed, equal chest rise and fall noted.  Sitter outside of room

## 2022-07-22 ENCOUNTER — OFFICE VISIT (OUTPATIENT)
Dept: PRIMARY CARE CLINIC | Age: 72
End: 2022-07-22
Payer: MEDICARE

## 2022-07-22 ENCOUNTER — ANESTHESIA EVENT (OUTPATIENT)
Dept: OPERATING ROOM | Age: 72
End: 2022-07-22
Payer: MEDICARE

## 2022-07-22 VITALS
BODY MASS INDEX: 38.19 KG/M2 | SYSTOLIC BLOOD PRESSURE: 132 MMHG | DIASTOLIC BLOOD PRESSURE: 77 MMHG | RESPIRATION RATE: 18 BRPM | HEART RATE: 98 BPM | WEIGHT: 251.2 LBS

## 2022-07-22 DIAGNOSIS — I50.32 CHRONIC DIASTOLIC HEART FAILURE (HCC): ICD-10-CM

## 2022-07-22 DIAGNOSIS — E66.01 SEVERE OBESITY (BMI 35.0-35.9 WITH COMORBIDITY) (HCC): ICD-10-CM

## 2022-07-22 DIAGNOSIS — E11.40 TYPE 2 DIABETES MELLITUS WITH DIABETIC NEUROPATHY, WITHOUT LONG-TERM CURRENT USE OF INSULIN (HCC): Primary | ICD-10-CM

## 2022-07-22 LAB — HBA1C MFR BLD: 6.9 %

## 2022-07-22 PROCEDURE — 3017F COLORECTAL CA SCREEN DOC REV: CPT | Performed by: FAMILY MEDICINE

## 2022-07-22 PROCEDURE — 83036 HEMOGLOBIN GLYCOSYLATED A1C: CPT | Performed by: FAMILY MEDICINE

## 2022-07-22 PROCEDURE — 3044F HG A1C LEVEL LT 7.0%: CPT | Performed by: FAMILY MEDICINE

## 2022-07-22 PROCEDURE — 1123F ACP DISCUSS/DSCN MKR DOCD: CPT | Performed by: FAMILY MEDICINE

## 2022-07-22 PROCEDURE — PBSHW POCT GLYCOSYLATED HEMOGLOBIN (HGB A1C): Performed by: FAMILY MEDICINE

## 2022-07-22 PROCEDURE — G8427 DOCREV CUR MEDS BY ELIG CLIN: HCPCS | Performed by: FAMILY MEDICINE

## 2022-07-22 PROCEDURE — 1036F TOBACCO NON-USER: CPT | Performed by: FAMILY MEDICINE

## 2022-07-22 PROCEDURE — G8417 CALC BMI ABV UP PARAM F/U: HCPCS | Performed by: FAMILY MEDICINE

## 2022-07-22 PROCEDURE — 2022F DILAT RTA XM EVC RTNOPTHY: CPT | Performed by: FAMILY MEDICINE

## 2022-07-22 PROCEDURE — 99213 OFFICE O/P EST LOW 20 MIN: CPT | Performed by: FAMILY MEDICINE

## 2022-07-22 RX ORDER — GABAPENTIN 300 MG/1
CAPSULE ORAL
COMMUNITY
Start: 2022-06-27

## 2022-07-22 NOTE — PATIENT INSTRUCTIONS
SURVEY:    You may be receiving a survey from [employer] regarding your visit today. You may get this in the mail, through your MyChart, or in your email. Please complete the survey to enable us to provide the highest quality of care to you and your family. If you cannot score us a very good (5 Stars) on any question, please call the office to discuss how we could of made your experience exceptional.    Thank you!     Dr. Darrick Chauhan, N  [facility], [address] [place] [address], [address] [place] [address]    Phone: [phone]  Fax: [phone]    Office Hours:   Osiris Whitman, [address] [facility], F: 8-5

## 2022-07-22 NOTE — PROGRESS NOTES
Phuc Low is a 67 y.o. male here for routine follow up of diabetes. He has not been checking  his blood glucose levels regularly. He denies numbness and tingling in the hands and feet. He has been compliant with diet and exercise. He has been compliant with his medications. He is tolerating medications. Hypertension: Patient here for follow-up of elevated blood pressure. He is somewhat exercising and is adherent to low salt diet. Blood pressure is not well controlled at home. Patient does not check his blood pressure at home. Cardiac symptoms- patient gets occasionally check tightness. Patient denies fatigue and palpitations. Cardiovascular risk factors: advanced age (older than 54 for men, 72 for women), diabetes mellitus, hypertension, and obesity (BMI >= 30 kg/m2). Use of agents associated with hypertension: none. Congestive Heart Failure  Patient presents for re-evaluation of congestive heart failure. Patient's current complaints are chest tightness He denies fatigue and palpitations. He states he is compliant most of the time with his medications. He states he is compliant most of the time with his diet. Allergies:  Sulfa antibiotics and Pcn [penicillins]    Past Medical History:    Past Medical History:   Diagnosis Date    Aortic aneurysm, thoracic ([facility] [address] 7/18/2016    Ataxia     Gait ataxia x 1 yr ago (approx. 2010)    CHF (congestive heart failure) ([facility])     COP[facility] 4/4/2016    H/O cardiovascular stress test 08/28/2018    Equivocal myocardial perfusion study. There is a small/ moderate perfusion defect of mild intestiy in the inferoseptal and septal regions during stress imaging which is most consistent with ischemia but may be due to artifact. Overall, these results are most consistent with a low risk for CAD. H/O echocardiogram 06/30/2016    EF >60%. Mild LV hypertrophy with a normal LV cavity size.  Aortic leaflet calcification with moderate aortic stenosis with a mean greadient of 20 mmHg. Mild pulmonary hypertension estimated right ventricular systolic pressure of 37 mmHg. Mild tricuspid regurgitation. Moderate diastolic dysfunction. H/O echocardiogram 08/28/2018    EF >55%. The LV wall thickness is mildly increased. The pt has a sigmoid interventricular septum without evidence of outflow tract obstruction. Mild to modereate mitral regurg. Mild to tricuspid regurg. Evidence of mild (grade I) diastolic dysfunction is seen. The aortic root is considered to be at the upper limits of normal in size when corrected for body surface area. Heart failure ([address] 2003    History of echocardiogram 11/20/2014    Global left ventricular systolic function appears to be hyperdynamic with EF of  > 70% Left atrium is mildly dilated (29-33) with left atrial volume index of 31 ml/m2. Moderate regurgitation, mild tricuspid regurgitation. Evidence of moderate (gradeI II )  diastolic dysfunction is seen. History of stress test 12/30/15    Normal. EF 63%. Low risk for significant CAD. Hx of cardiac cath 07/21/2016    LMCA: Mild irregularites 10-20%. LAD: Mild irregularities 40-50%. LCx: Mild irregularities 30-40%. RCA: Mild irregularities 30-40%.     Hypertension     Obesity due to excess calories 4/4/2016    DAINA (obstructive sleep apnea) 4/4/2016    Polyneuropathy     Dec 2010    Smoking 4/4/2016    TIA (transient ischemic attack) 2003 & 2004    Type 2 diabetes mellitus without complication [facility])        Past Surgical History:    Past Surgical History:   Procedure Laterality Date    COLONOSCOPY      many many years ago     TOOTH EXTRACTION      yrs ago    UPPER GASTROINTESTINAL ENDOSCOPY N/A 5/16/2018    EGD BIOPSY performed by Chantal Hewitt MD at [facility]  5/16/2018    EGD DILATION SAVORY performed by Chantal Hewitt MD at [facility]  5/16/2018    EGD POLYP SNARE performed by Willa Monroy MD at [address] [place] [address]  05/16/2018    -bx,dilation,gastric polyp       Social History:   Social History     Tobacco Use    Smoking status: Former     Packs/day: 0.00     Years: 50.00     Pack years: 0.00     Types: Cigarettes     Quit date: 01/2019     Years since quitting: 3.5    Smokeless tobacco: Never    Tobacco comments:     on patches   Substance Use Topics    Alcohol use: No       Family History:   Family History   Problem Relation Age of Onset    Heart Disease Mother     Ovarian Cancer Mother     Heart Disease Father     Stroke Father     Other Sister         rheumatic fever         Review of Systems:  Constitutional: negative for fever or chills  Eyes: negative for visual disturbance   ENT: negative for sore throat or nasal congestion  Respiratory: negative for cough, shortness of breath and sputum  Cardiovascular: negative for chest pain,pnd,claudication or palpitations  Gastrointestinal: negative for abd pain, derrick,nausea, vomiting, diarrhea or constipation  Genitourinary: negative for dysuria,,hematuria urgency or frequency  Musculoskeletal:negative for arthralgias, muscle weakness and stiff joints   Integument/breast: negative for skin rash or lesions  Neurological: negative for   numbness and tingling. Psych: negative for anxiety, depression, suicidial ideation and suicidal attempt. Objective:  Physical Exam:  /77 (Site: Right Upper Arm, Position: Sitting)   Pulse 98   Resp 18   Wt 251 lb 3.2 oz (113.9 kg)   BMI 38.19 kg/m²   GEN:   He is alert and oriented  ENT:    ENT exam normal, no neck nodes or sinus tenderness  NECK:   neck supple and non tender without mass, no thyromegaly or thyroid nodules, no cervical lymphadenopathy  EYES:   No gross abnormalities.   CVS:     CVS exam BP noted to be well controlled today in office, s1,s2,2/6 murmer  PULM:   chest clear, no wheezing, rales, normal symmetric air entry  ABD:   Abdomen soft, non-tender. BS normal. No masses,  No organomegaly  MUSC: has deformed joints  Skin : no  rash or lesions  EXT: {EXTREMITIES EXAM:20816::\"Extremities: + 2 pedal pulses, 1 +edema o,no calf tenderness, and warm to touch  FEET:  no open sores are present bilaterally  NEURO: monofilament decreased bilaterally DTR -diminished        Diagnostic Data:  Lab Results   Component Value Date    GLUCOSE 159 (H) 11/15/2021    LABA1C 6.9 07/22/2022    BUN 13 12/02/2021     11/15/2021    K 4.2 11/15/2021    CALCIUM 9.1 11/15/2021     11/15/2021    CO2 26 11/15/2021       Assessment:  1. Type 2 diabetes mellitus with diabetic neuropathy, without long-term current use of insulin ([facility]    2. Chronic diastolic heart failure ([facility]    3. Severe obesity (BMI 35.0-35.9 with comorbidity) ([facility] [facility]      Plan   Diagnosis Orders   1. Type 2 diabetes mellitus with diabetic neuropathy, without long-term current use of insulin ([facility])  POCT glycosylated hemoglobin (Hb A1C)      2. Chronic diastolic heart failure ([facility]        3. Severe obesity (BMI 35.0-35.9 with comorbidity) ([facility])            Check BS 1 times per day  Encouraged low fat, low sodium, and diabetic, 1800 calorie diet.   Goal for blood pressure control is 120/80  Recommended regular exercise as tolerated, 5 times per week  Labs reviewed hba1c 6.9    ascivd risk  Orders Placed This Encounter   Procedures    POCT glycosylated hemoglobin (Hb A1C)       Current Outpatient Medications   Medication Sig Dispense Refill    potassium chloride (KLOR-CON M) 20 MEQ extended release tablet TAKE 1 TABLET BY MOUTH THREE TIMES DAILY 90 tablet 1    gabapentin (NEURONTIN) 300 MG capsule TAKE 1 CAPSULE BY MOUTH THREE TIMES DAILY      glipiZIDE (GLUCOTROL) 5 MG tablet Take 1 tablet by mouth daily 90 tablet 0    busPIRone (BUSPAR) 15 MG tablet Take 15 mg by mouth daily 90 tablet 0    blood glucose monitor strips Test 1 times a day 100 strip 2    Blood Glucose Monitoring Suppl w/Device KIT 1 kit by Does not apply route Daily Please check blood glucose levels daily. 1 kit 0    Lancets Misc. MISC 1 applicator by Does not apply route daily 100 each 2    nystatin (MYCOSTATIN) 869726 UNIT/GM powder Apply 3 times daily. 60 g 2    atorvastatin (LIPITOR) 40 MG tablet Take 1 tablet by mouth daily 90 tablet 0    glucose monitoring (FREESTYLE FREEDOM) kit 1 kit by Does not apply route daily 1 kit 0    potassium chloride (KLOR-CON M) 20 MEQ TBCR extended release tablet Take 1 tablet by mouth 3 times daily 270 tablet 0    pantoprazole (PROTONIX) 40 MG tablet Take 1 tablet by mouth 2 times daily 180 tablet 3    benazepril (LOTENSIN) 5 MG tablet Take 1 tablet by mouth daily 90 tablet 3    NONFORMULARY Tumeric      acetaminophen (TYLENOL) 500 MG tablet Take 1,000 mg by mouth daily as needed       ascorbic acid (VITAMIN C) 500 MG tablet Take 500 mg by mouth daily 3 in am and 2 at hs      Glucosamine-Chondroitin (GLUCOSAMINE CHONDR COMPLEX PO) Take by mouth      Cinnamon 500 MG CAPS Take 2,500 mg by mouth daily. aspirin 81 MG EC tablet Take 81 mg by mouth daily. Cyanocobalamin (B-12 PO) Take 1,000 mg by mouth. Pt states to be taking 2500 mg daily at this time. Omega-3 Fatty Acids (FISH OIL) 1000 MG CAPS Take 3,000 mg by mouth daily. loratadine (CLARITIN) 10 MG tablet Take 5 mg by mouth daily        No current facility-administered medications for this visit. No follow-ups on file.       Electronically signed by Helen Marrufo MD on 7/22/2022 at 3:45 PM

## 2022-07-22 NOTE — PROGRESS NOTES
Attempted PAT phone call; no answer; no voicemail box set up. Dr Janeth Sales office made aware of the attempts to contact patient with no success.

## 2022-07-24 PROBLEM — H25.12 AGE-RELATED NUCLEAR CATARACT OF LEFT EYE: Chronic | Status: ACTIVE | Noted: 2022-07-24

## 2022-07-25 ENCOUNTER — HOSPITAL ENCOUNTER (OUTPATIENT)
Age: 72
Setting detail: OUTPATIENT SURGERY
Discharge: HOME | End: 2022-07-25
Attending: OPHTHALMOLOGY | Admitting: OPHTHALMOLOGY
Payer: MEDICARE

## 2022-07-25 ENCOUNTER — ANESTHESIA (OUTPATIENT)
Dept: OPERATING ROOM | Age: 72
End: 2022-07-25
Payer: MEDICARE

## 2022-07-25 VITALS
TEMPERATURE: 96.9 F | SYSTOLIC BLOOD PRESSURE: 124 MMHG | WEIGHT: 248 LBS | RESPIRATION RATE: 16 BRPM | BODY MASS INDEX: 37.59 KG/M2 | OXYGEN SATURATION: 94 % | HEART RATE: 82 BPM | HEIGHT: 68 IN | DIASTOLIC BLOOD PRESSURE: 81 MMHG

## 2022-07-25 PROBLEM — H25.12 AGE-RELATED NUCLEAR CATARACT OF LEFT EYE: Chronic | Status: RESOLVED | Noted: 2022-07-24 | Resolved: 2022-07-25

## 2022-07-25 PROCEDURE — 3600000003 HC SURGERY LEVEL 3 BASE: Performed by: OPHTHALMOLOGY

## 2022-07-25 PROCEDURE — 2709999900 HC NON-CHARGEABLE SUPPLY: Performed by: OPHTHALMOLOGY

## 2022-07-25 PROCEDURE — 6370000000 HC RX 637 (ALT 250 FOR IP): Performed by: OPHTHALMOLOGY

## 2022-07-25 PROCEDURE — 2580000003 HC RX 258: Performed by: NURSE ANESTHETIST, CERTIFIED REGISTERED

## 2022-07-25 PROCEDURE — 6360000002 HC RX W HCPCS: Performed by: NURSE ANESTHETIST, CERTIFIED REGISTERED

## 2022-07-25 PROCEDURE — 3700000001 HC ADD 15 MINUTES (ANESTHESIA): Performed by: OPHTHALMOLOGY

## 2022-07-25 PROCEDURE — 7100000011 HC PHASE II RECOVERY - ADDTL 15 MIN: Performed by: OPHTHALMOLOGY

## 2022-07-25 PROCEDURE — 2500000003 HC RX 250 WO HCPCS: Performed by: OPHTHALMOLOGY

## 2022-07-25 PROCEDURE — 3700000000 HC ANESTHESIA ATTENDED CARE: Performed by: OPHTHALMOLOGY

## 2022-07-25 PROCEDURE — V2632 POST CHMBR INTRAOCULAR LENS: HCPCS | Performed by: OPHTHALMOLOGY

## 2022-07-25 PROCEDURE — 7100000010 HC PHASE II RECOVERY - FIRST 15 MIN: Performed by: OPHTHALMOLOGY

## 2022-07-25 PROCEDURE — 3600000013 HC SURGERY LEVEL 3 ADDTL 15MIN: Performed by: OPHTHALMOLOGY

## 2022-07-25 PROCEDURE — 6360000002 HC RX W HCPCS: Performed by: OPHTHALMOLOGY

## 2022-07-25 DEVICE — LENS INTRAOCULAR BCNVX 20.5+ DIOPT 6X12.5 MM ACRYL ENVISTA: Type: IMPLANTABLE DEVICE | Site: EYE | Status: FUNCTIONAL

## 2022-07-25 RX ORDER — SODIUM CHLORIDE 0.9 % (FLUSH) 0.9 %
5-40 SYRINGE (ML) INJECTION PRN
Status: DISCONTINUED | OUTPATIENT
Start: 2022-07-25 | End: 2022-07-25 | Stop reason: HOSPADM

## 2022-07-25 RX ORDER — TROPICAMIDE 10 MG/ML
1 SOLUTION/ DROPS OPHTHALMIC SEE ADMIN INSTRUCTIONS
Status: DISCONTINUED | OUTPATIENT
Start: 2022-07-25 | End: 2022-07-25 | Stop reason: HOSPADM

## 2022-07-25 RX ORDER — SODIUM CHLORIDE 9 MG/ML
INJECTION, SOLUTION INTRAVENOUS PRN
Status: DISCONTINUED | OUTPATIENT
Start: 2022-07-25 | End: 2022-07-25 | Stop reason: HOSPADM

## 2022-07-25 RX ORDER — PROPARACAINE HYDROCHLORIDE 5 MG/ML
1 SOLUTION/ DROPS OPHTHALMIC SEE ADMIN INSTRUCTIONS
Status: DISCONTINUED | OUTPATIENT
Start: 2022-07-25 | End: 2022-07-25 | Stop reason: HOSPADM

## 2022-07-25 RX ORDER — SODIUM CHLORIDE, SODIUM LACTATE, POTASSIUM CHLORIDE, CALCIUM CHLORIDE 600; 310; 30; 20 MG/100ML; MG/100ML; MG/100ML; MG/100ML
INJECTION, SOLUTION INTRAVENOUS CONTINUOUS
Status: DISCONTINUED | OUTPATIENT
Start: 2022-07-25 | End: 2022-07-25 | Stop reason: HOSPADM

## 2022-07-25 RX ORDER — FENTANYL CITRATE 50 UG/ML
INJECTION, SOLUTION INTRAMUSCULAR; INTRAVENOUS PRN
Status: DISCONTINUED | OUTPATIENT
Start: 2022-07-25 | End: 2022-07-25 | Stop reason: SDUPTHER

## 2022-07-25 RX ORDER — SODIUM CHLORIDE 0.9 % (FLUSH) 0.9 %
5-40 SYRINGE (ML) INJECTION EVERY 12 HOURS SCHEDULED
Status: DISCONTINUED | OUTPATIENT
Start: 2022-07-25 | End: 2022-07-25 | Stop reason: HOSPADM

## 2022-07-25 RX ORDER — TETRACAINE HYDROCHLORIDE 5 MG/ML
SOLUTION OPHTHALMIC PRN
Status: DISCONTINUED | OUTPATIENT
Start: 2022-07-25 | End: 2022-07-25 | Stop reason: ALTCHOICE

## 2022-07-25 RX ORDER — SODIUM CHLORIDE 0.9 % (FLUSH) 0.9 %
5-40 SYRINGE (ML) INJECTION PRN
Status: DISCONTINUED | OUTPATIENT
Start: 2022-07-25 | End: 2022-07-25 | Stop reason: SDUPTHER

## 2022-07-25 RX ORDER — PHENYLEPHRINE HCL 2.5 %
1 DROPS OPHTHALMIC (EYE) SEE ADMIN INSTRUCTIONS
Status: DISCONTINUED | OUTPATIENT
Start: 2022-07-25 | End: 2022-07-25 | Stop reason: HOSPADM

## 2022-07-25 RX ORDER — SODIUM CHLORIDE 9 MG/ML
INJECTION, SOLUTION INTRAVENOUS CONTINUOUS
Status: DISCONTINUED | OUTPATIENT
Start: 2022-07-25 | End: 2022-07-25 | Stop reason: HOSPADM

## 2022-07-25 RX ORDER — LIDOCAINE HYDROCHLORIDE 10 MG/ML
INJECTION, SOLUTION EPIDURAL; INFILTRATION; INTRACAUDAL; PERINEURAL PRN
Status: DISCONTINUED | OUTPATIENT
Start: 2022-07-25 | End: 2022-07-25 | Stop reason: ALTCHOICE

## 2022-07-25 RX ORDER — TETRACAINE HYDROCHLORIDE 5 MG/ML
1 SOLUTION OPHTHALMIC SEE ADMIN INSTRUCTIONS
Status: DISCONTINUED | OUTPATIENT
Start: 2022-07-25 | End: 2022-07-25 | Stop reason: HOSPADM

## 2022-07-25 RX ORDER — MIDAZOLAM HYDROCHLORIDE 1 MG/ML
INJECTION INTRAMUSCULAR; INTRAVENOUS PRN
Status: DISCONTINUED | OUTPATIENT
Start: 2022-07-25 | End: 2022-07-25 | Stop reason: SDUPTHER

## 2022-07-25 RX ORDER — SODIUM CHLORIDE 0.9 % (FLUSH) 0.9 %
5-40 SYRINGE (ML) INJECTION EVERY 12 HOURS SCHEDULED
Status: DISCONTINUED | OUTPATIENT
Start: 2022-07-25 | End: 2022-07-25 | Stop reason: SDUPTHER

## 2022-07-25 RX ORDER — SODIUM CHLORIDE 9 MG/ML
INJECTION, SOLUTION INTRAVENOUS PRN
Status: DISCONTINUED | OUTPATIENT
Start: 2022-07-25 | End: 2022-07-25 | Stop reason: SDUPTHER

## 2022-07-25 RX ADMIN — PHENYLEPHRINE HYDROCHLORIDE 1 DROP: 25 SOLUTION/ DROPS OPHTHALMIC at 14:17

## 2022-07-25 RX ADMIN — SODIUM CHLORIDE, POTASSIUM CHLORIDE, SODIUM LACTATE AND CALCIUM CHLORIDE: 600; 310; 30; 20 INJECTION, SOLUTION INTRAVENOUS at 14:05

## 2022-07-25 RX ADMIN — PHENYLEPHRINE HYDROCHLORIDE 1 DROP: 25 SOLUTION/ DROPS OPHTHALMIC at 13:59

## 2022-07-25 RX ADMIN — PROPARACAINE HYDROCHLORIDE 1 DROP: 5 SOLUTION/ DROPS OPHTHALMIC at 13:59

## 2022-07-25 RX ADMIN — TROPICAMIDE 1 DROP: 10 SOLUTION/ DROPS OPHTHALMIC at 14:17

## 2022-07-25 RX ADMIN — MIDAZOLAM 1 MG: 1 INJECTION INTRAMUSCULAR; INTRAVENOUS at 14:38

## 2022-07-25 RX ADMIN — TETRACAINE HYDROCHLORIDE 1 DROP: 5 SOLUTION OPHTHALMIC at 14:28

## 2022-07-25 RX ADMIN — TROPICAMIDE 1 DROP: 10 SOLUTION/ DROPS OPHTHALMIC at 13:59

## 2022-07-25 RX ADMIN — TROPICAMIDE 1 DROP: 10 SOLUTION/ DROPS OPHTHALMIC at 14:09

## 2022-07-25 RX ADMIN — FENTANYL CITRATE 50 MCG: 50 INJECTION INTRAMUSCULAR; INTRAVENOUS at 14:35

## 2022-07-25 RX ADMIN — PHENYLEPHRINE HYDROCHLORIDE 1 DROP: 25 SOLUTION/ DROPS OPHTHALMIC at 14:09

## 2022-07-25 RX ADMIN — PROPARACAINE HYDROCHLORIDE 1 DROP: 5 SOLUTION/ DROPS OPHTHALMIC at 14:09

## 2022-07-25 RX ADMIN — MIDAZOLAM 1 MG: 1 INJECTION INTRAMUSCULAR; INTRAVENOUS at 14:35

## 2022-07-25 RX ADMIN — PROPARACAINE HYDROCHLORIDE 1 DROP: 5 SOLUTION/ DROPS OPHTHALMIC at 14:17

## 2022-07-25 RX ADMIN — FENTANYL CITRATE 50 MCG: 50 INJECTION INTRAMUSCULAR; INTRAVENOUS at 14:49

## 2022-07-25 ASSESSMENT — LIFESTYLE VARIABLES: SMOKING_STATUS: 0

## 2022-07-25 ASSESSMENT — PAIN - FUNCTIONAL ASSESSMENT: PAIN_FUNCTIONAL_ASSESSMENT: NONE - DENIES PAIN

## 2022-07-25 NOTE — PROGRESS NOTES
Patient verbalizes readiness for discharge at this time. Discharge Criteria    Inpatients must meet Criteria 1 through 7. All other patients are either YES or N/A. If a NO is chosen then Anesthesia or Surgeon must be notified. 1.  Minimum 30 minutes after last dose of sedative medication, minimum 120 minutes after last dose of reversal agent. Yes      2. Systolic BP stable within 20 mmHg for 30 minutes & systolic BP between 90 & 090 or within 10 mmHg of baseline. Yes      3. Pulse between 60 and 100 or within 10 bpm of baseline. Yes      4. Spontaneous respiratory rate >/= 10 per minute. Yes      5. SaO2 >/= 95 or  >/= baseline. Yes      6. Able to cough and swallow or return to baseline function. Yes      7. Alert and oriented or return to baseline mental status. Yes      8. Demonstrates controlled, coordinated movements, ambulates with steady gait, or return to baseline activity function. Yes      9. Minimal or no pain or nausea, or at a level tolerable and acceptable to patient. Yes      10. Takes and retains oral fluids as allowed. Yes      11. Procedural / perioperative site stable. Minimal or no bleeding. Yes          12. If GI endoscopy procedure, minimal or no abdominal distention or passing flatus. N/A      13. Written discharge instructions and emergency telephone number provided. Yes      14. Accompanied by a responsible adult.     Yes

## 2022-07-25 NOTE — DISCHARGE INSTRUCTIONS
SAME DAY SURGERY DISCHARGE INSTRUCTIONS    1. Do not drive or operate hazardous machinery for 24 hours. 2.  Do not make important personal or business decisions for 24 hours. 3.  Do not drink alcoholic beverages for 24 hours. 4.  Do not smoke tobacco products for 24 hours. 5.  Limit your activities for 24 hours. Do not engage in heavy work until your surgeon gives you permission. 6.  Report the following signs or any questions regarding your physical condition to your surgeon immediately:    Excessive swelling of, or around the wound area. Redness. Temperature of 100 degrees (F) or above. Excessive pain. 7.  Call your surgeon for any questions regarding your surgery. CATARACT DISCHARGE INSTRUCTIONS    Do not remove eye patch/shield today. Protect the operated eye during sleep by covering it with clear plastic shield. Tape the shield securely to the face before retiring . Do this for one week after surgery. Avoid bumping the operated eye during the daytime. Sensitivity to light and watering of the eye is normal during the first month. Wearing of dark glasses will help these symptoms and this is optional.    Minor crusting and discharge adherent to the lid margins will persist till the incision heals. Cleanse the lids by application of a warm compress several times a day as needed. Use of either the operated eye or unoperated eye is not harmful. Until the new glasses are prescribed, the operated eye may be out of focus and may not see details clearly. Vision maybe clearer in the operated eye without glasses. You may do everything necessary to care for yourself, including hair care, tooth brushing, dressing, etc. Light work,including stooping over and lifting, is not harmful. Please phone if any problems arise during the healing period. The office number is [phone].       Take surgery bag and all eye drops to Dr. Yayo Gonsales office tomorrow at 9:20am.    Pattie Alcazar may resume your normal diet.     Start your eye drops tomorrow after your post-op appointment:    Ofloxacin/Polytrim one drop to the operated eye 4 times daily    Prednisolone one drop to the operated eye 4 times daily

## 2022-07-25 NOTE — ANESTHESIA PRE PROCEDURE
Department of Anesthesiology  Preprocedure Note       Name:  Melody Calle   Age:  67 y.o.  :  1950                                          MRN:  [id number]         Date:  2022      Surgeon: Bridger Mcbride):  Varun Baez DO    Procedure: Procedure(s):  EYE TRABECULAR MICRO BYPASS  EYE CATARACT EMULSIFICATION IOL IMPLANT    Medications prior to admission:   Prior to Admission medications    Medication Sig Start Date End Date Taking? Authorizing Provider   potassium chloride (KLOR-CON M) 20 MEQ extended release tablet TAKE 1 TABLET BY MOUTH THREE TIMES DAILY 22   Magen Saenz MD   gabapentin (NEURONTIN) 300 MG capsule TAKE 1 CAPSULE BY MOUTH THREE TIMES DAILY 22   Historical Provider, MD   glipiZIDE (GLUCOTROL) 5 MG tablet Take 1 tablet by mouth daily 22   Magen Saenz MD   busPIRone (BUSPAR) 15 MG tablet Take 15 mg by mouth daily 22   Magen Saenz MD   blood glucose monitor strips Test 1 times a day 22   Magen Saenz MD   Blood Glucose Monitoring Suppl w/Device KIT 1 kit by Does not apply route Daily Please check blood glucose levels daily. 22   Magen Saenz MD   Lancets Misc. MISC 1 applicator by Does not apply route daily 22   Magen Saenz MD   nystatin (MYCOSTATIN) 821795 UNIT/GM powder Apply 3 times daily.  22   Magen Saenz MD   atorvastatin (LIPITOR) 40 MG tablet Take 1 tablet by mouth daily 22   Magen Saenz MD   glucose monitoring (FREESTYLE FREEDOM) kit 1 kit by Does not apply route daily 22   Magen Saenz MD   potassium chloride (KLOR-CON M) 20 MEQ TBCR extended release tablet Take 1 tablet by mouth 3 times daily 22   Magen Saenz MD   pantoprazole (PROTONIX) 40 MG tablet Take 1 tablet by mouth 2 times daily 22   Magen Saenz MD   benazepril (LOTENSIN) 5 MG tablet Take 1 tablet by mouth daily 21   Magen Saenz MD   NONFORMULARY Tumeric    Historical Provider, MD acetaminophen (TYLENOL) 500 MG tablet Take 1,000 mg by mouth daily as needed     Historical Provider, MD   ascorbic acid (VITAMIN C) 500 MG tablet Take 500 mg by mouth daily 3 in am and 2 at hs    Historical Provider, MD   Glucosamine-Chondroitin (GLUCOSAMINE CHONDR COMPLEX PO) Take by mouth    Historical Provider, MD   Cinnamon 500 MG CAPS Take 2,500 mg by mouth daily. Historical Provider, MD   aspirin 81 MG EC tablet Take 81 mg by mouth daily. Historical Provider, MD   Cyanocobalamin (B-12 PO) Take 1,000 mg by mouth. Pt states to be taking 2500 mg daily at this time. Historical Provider, MD   Omega-3 Fatty Acids (FISH OIL) 1000 MG CAPS Take 3,000 mg by mouth daily.     Historical Provider, MD   loratadine (CLARITIN) 10 MG tablet Take 5 mg by mouth daily     Historical Provider, MD       Current medications:    Current Facility-Administered Medications   Medication Dose Route Frequency Provider Last Rate Last Admin    lactated ringers infusion   IntraVENous Continuous Jose Ashjanelle, APRN - CRNA 100 mL/hr at 07/25/22 1405 New Bag at 07/25/22 1405    0.9 % sodium chloride infusion   IntraVENous Continuous Yvonne Susan, DO        sodium chloride flush 0.9 % injection 5-40 mL  5-40 mL IntraVENous 2 times per day Yvonne Susan, DO        sodium chloride flush 0.9 % injection 5-40 mL  5-40 mL IntraVENous PRN Yvonne Susan, DO        0.9 % sodium chloride infusion   IntraVENous PRN Yvonne Susan, DO        proparacaine (ALCAINE) 0.5 % ophthalmic solution 1 drop  1 drop Left Eye See Admin Instructions Yvonne Davis, DO   1 drop at 07/25/22 1417    tetracaine (TETRAVISC) 0.5 % ophthalmic solution 1 drop  1 drop Left Eye See Admin Instructions Yvonne Davis, DO        tropicamide (MYDRIACYL) 1 % ophthalmic solution 1 drop  1 drop Left Eye See Admin Instructions Yvonne Davis, DO   1 drop at 07/25/22 1417    phenylephrine (MYDFRIN) 2.5 % ophthalmic solution 1 drop  1 drop Left Eye See Admin Instructions Jil Cristobal, DO   1 drop at 07/25/22 1417       Allergies: Allergies   Allergen Reactions    Sulfa Antibiotics     Pcn [Penicillins] Rash       Problem List:    Patient Active Problem List   Diagnosis Code    Chest pain R07.9    Obesity E66.9    Personal history of TIA (transient ischemic attack) Z86.73    Mitral regurgitation I34.0    Cellulitis L03.90    Bilateral leg pain M79.604, M79.605    Bilateral foot pain M79.671, M79.672    Low back pain M54.50    Essential hypertension I10    Chronic diastolic heart failure ([facility]) I50.32    DAINA (obstructive sleep apnea) G47.33    Obesity due to excess calories E66.09    Smoking F17.200    Chronic pain G89.29    H/O echocardiogram Z92.89    Chronic obstructive pulmonary disease ([facility]) J44.9    Aortic aneurysm, thoracic ([facility]) I71.2    Thoracic aortic aneurysm without rupture ([facility]) I71.2    Depression F32. A    Esophageal dysphagia R13.19    Gastroesophageal reflux disease K21.9    Gastric polyp K31.7    Type 2 diabetes mellitus without complication, without long-term current use of insulin ([facility]) E11.9    Dyslipidemia E78.5    Coronary atherosclerosis I25.10    Type 2 diabetes mellitus with diabetic neuropathy E11.40    Age-related nuclear cataract of left eye H25.12       Past Medical History:        Diagnosis Date    [facility] 7/18/2016    Ataxia     Gait ataxia x 1 yr ago (approx. 2010)    CHF (congestive heart failure) ([facility])     COPD (chronic obstructive pulmonary disease) ([facility] 4/4/2016    H/O cardiovascular stress test 08/28/2018    Equivocal myocardial perfusion study. There is a small/ moderate perfusion defect of mild intestiy in the inferoseptal and septal regions during stress imaging which is most consistent with ischemia but may be due to artifact. Overall, these results are most consistent with a low risk for CAD.     H/O echocardiogram 06/30/2016    EF >60%.  Mild LV hypertrophy with a normal LV cavity size. Aortic leaflet calcification with moderate aortic stenosis with a mean greadient of 20 mmHg. Mild pulmonary hypertension estimated right ventricular systolic pressure of 37 mmHg. Mild tricuspid regurgitation. Moderate diastolic dysfunction.  H/O echocardiogram 08/28/2018    EF >55%. The LV wall thickness is mildly increased. The pt has a sigmoid interventricular septum without evidence of outflow tract obstruction. Mild to modereate mitral regurg. Mild to tricuspid regurg. Evidence of mild (grade I) diastolic dysfunction is seen. The aortic root is considered to be at the upper limits of normal in size when corrected for body surface area.  Heart failure ([address] 2003    History of echocardiogram 11/20/2014    Global left ventricular systolic function appears to be hyperdynamic with EF of  > 70% Left atrium is mildly dilated (29-33) with left atrial volume index of 31 ml/m2. Moderate regurgitation, mild tricuspid regurgitation. Evidence of moderate (gradeI II )  diastolic dysfunction is seen.  History of stress test 12/30/15    Normal. EF 63%. Low risk for significant CAD.  Hx of cardiac cath 07/21/2016    LMCA: Mild irregularites 10-20%. LAD: Mild irregularities 40-50%. LCx: Mild irregularities 30-40%. RCA: Mild irregularities 30-40%.     Hypertension     Obesity due to excess calories 4/4/2016    DAINA (obstructive sleep apnea) 4/4/2016    Polyneuropathy     Dec 2010    Smoking 4/4/2016    TIA (transient ischemic attack) 2003 & 2004    Type 2 diabetes mellitus without complication [facility])        Past Surgical History:        Procedure Laterality Date    COLONOSCOPY      many many years ago     TOOTH EXTRACTION      yrs ago    UPPER GASTROINTESTINAL ENDOSCOPY N/A 5/16/2018    EGD BIOPSY performed by Rafita Cantor MD at [address] [place] [address]  5/16/2018    EGD DILATION SAVORY performed by Rafita Cantor MD at [facility] ENDOSCOPY  5/16/2018    EGD POLYP SNARE performed by Avery Amin MD at [address]  05/16/2018    -bx,dilation,gastric polyp       Social History:    Social History     Tobacco Use    Smoking status: Former     Packs/day: 0.00     Years: 50.00     Pack years: 0.00     Types: Cigarettes     Quit date: 01/2019     Years since quitting: 3.5    Smokeless tobacco: Never    Tobacco comments:     on patches   Substance Use Topics    Alcohol use: No                                Counseling given: Not Answered  Tobacco comments: on patches      Vital Signs (Current):   Vitals:    07/25/22 1355 07/25/22 1401   BP:  (!) 133/102   Pulse:  87   Resp:  22   Temp:  36.8 °C (98.2 °F)   TempSrc:  Temporal   SpO2:  96%   Weight: 248 lb (112.5 kg)    Height: 5' 8\" (1.727 m)                                               BP Readings from Last 3 Encounters:   07/25/22 (!) 133/102   07/22/22 132/77   04/22/22 121/79       NPO Status: Time of last liquid consumption: 0900 (sips with meds)                        Time of last solid consumption: 2300                        Date of last liquid consumption: 07/25/22                        Date of last solid food consumption: 07/24/22    BMI:   Wt Readings from Last 3 Encounters:   07/25/22 248 lb (112.5 kg)   07/22/22 251 lb 3.2 oz (113.9 kg)   04/22/22 253 lb 9.6 oz (115 kg)     Body mass index is 37.71 kg/m².     CBC:   Lab Results   Component Value Date/Time    WBC 7.2 05/19/2020 02:28 PM    RBC 4.24 05/19/2020 02:28 PM    HGB 12.7 05/19/2020 02:28 PM    HCT 39.5 05/19/2020 02:28 PM    MCV 93.2 05/19/2020 02:28 PM    RDW 13.6 05/19/2020 02:28 PM     05/19/2020 02:28 PM       CMP:   Lab Results   Component Value Date/Time     11/15/2021 12:49 PM    K 4.2 11/15/2021 12:49 PM     11/15/2021 12:49 PM    CO2 26 11/15/2021 12:49 PM    BUN 13 12/02/2021 01:59 PM    CREATININE 0.73 12/02/2021 01:59 PM    GFRAA >60 12/02/2021 01:59 PM    LABGLOM >60 12/02/2021 01:59 PM    GLUCOSE 159 11/15/2021 12:49 PM    PROT 6.0 08/03/2018 06:16 AM    CALCIUM 9.1 11/15/2021 12:49 PM    BILITOT 0.89 08/03/2018 06:16 AM    ALKPHOS 83 08/03/2018 06:16 AM    AST 37 08/03/2018 06:16 AM    ALT 51 08/03/2018 06:16 AM       POC Tests: No results for input(s): POCGLU, POCNA, POCK, POCCL, POCBUN, POCHEMO, POCHCT in the last 72 hours. Coags:   Lab Results   Component Value Date/Time    PROTIME 10.7 06/30/2016 11:22 AM    INR 1.0 06/30/2016 11:22 AM    APTT 27.6 11/01/2013 10:30 PM       HCG (If Applicable): No results found for: PREGTESTUR, PREGSERUM, HCG, HCGQUANT     ABGs: No results found for: PHART, PO2ART, NLP5XSK, VSR1PPT, BEART, I4URIDCG     Type & Screen (If Applicable):  No results found for: LABABO, LABRH    Drug/Infectious Status (If Applicable):  No results found for: HIV, HEPCAB    COVID-19 Screening (If Applicable): No results found for: COVID19        Anesthesia Evaluation  Patient summary reviewed and Nursing notes reviewed no history of anesthetic complications:   Airway: Mallampati: II  TM distance: >3 FB   Neck ROM: full  Mouth opening: > = 3 FB   Dental:    (+) upper dentures and lower dentures      Pulmonary:normal exam    (+) COPD:      (-) sleep apnea and not a current smoker                           Cardiovascular:  Exercise tolerance: poor (<4 METS),   (+) hypertension:, hyperlipidemia      ECG reviewed      Echocardiogram reviewed               ROS comment: Summary  Global left ventricular systolic function appears preserved with an  estimated ejection fraction of >55%. The left ventricular cavity size is within normal limits and the left  ventricular wall thickness is mildly increased. The patient has a sigmoid interventricular septum without evidence of  outflow tract obstruction. Normal mitral valve structure with mild to moderate mitral regurgitation. Normal tricuspid valve structure with mild tricuspid regurgitation.   Evidence of mild (grade I) diastolic dysfunction is seen. The aortic root is considered to be at the upper limits of normal in size  when corrected for body surface area.     Compared to the previous study of 6/30/16, no significant change was seen. Neuro/Psych:   (+) TIA (no residual), depression/anxiety             GI/Hepatic/Renal:   (+) GERD: well controlled, morbid obesity          Endo/Other:    (+) Diabetes, . Abdominal:   (+) obese,           Vascular: Other Findings:           Anesthesia Plan      MAC     ASA 3       Induction: intravenous. Anesthetic plan and risks discussed with patient and spouse. Plan discussed with CRNA.                     ALFREDO Vilchis - JANN   7/25/2022

## 2022-07-25 NOTE — H&P
Can Olson is a 67y.o. year old who presents for elective outpatient ophthalmic surgery with Sadiq Paige DO. The patient complains of decreased vision, glare and halos around lights, and trouble with vision for activities of daily living. Past Medical History:   Diagnosis Date    Aortic aneurysm, thoracic ([address] 7/18/2016    Ataxia     Gait ataxia x 1 yr ago (approx. 2010)    CHF (congestive heart failure) ([facility])     COPD (chronic obstructive pulmonary disease) ([address] 4/4/2016    H/O cardiovascular stress test 08/28/2018    Equivocal myocardial perfusion study. There is a small/ moderate perfusion defect of mild intestiy in the inferoseptal and septal regions during stress imaging which is most consistent with ischemia but may be due to artifact. Overall, these results are most consistent with a low risk for CAD. H/O echocardiogram 06/30/2016    EF >60%. Mild LV hypertrophy with a normal LV cavity size. Aortic leaflet calcification with moderate aortic stenosis with a mean greadient of 20 mmHg. Mild pulmonary hypertension estimated right ventricular systolic pressure of 37 mmHg. Mild tricuspid regurgitation. Moderate diastolic dysfunction. H/O echocardiogram 08/28/2018    EF >55%. The LV wall thickness is mildly increased. The pt has a sigmoid interventricular septum without evidence of outflow tract obstruction. Mild to modereate mitral regurg. Mild to tricuspid regurg. Evidence of mild (grade I) diastolic dysfunction is seen. The aortic root is considered to be at the upper limits of normal in size when corrected for body surface area. Heart failure ([address] 2003    History of echocardiogram 11/20/2014    Global left ventricular systolic function appears to be hyperdynamic with EF of  > 70% Left atrium is mildly dilated (29-33) with left atrial volume index of 31 ml/m2. Moderate regurgitation, mild tricuspid regurgitation.  Evidence of moderate (gradeI II )  diastolic dysfunction is seen. History of stress test 12/30/15    Normal. EF 63%. Low risk for significant CAD. Hx of cardiac cath 07/21/2016    LMCA: Mild irregularites 10-20%. LAD: Mild irregularities 40-50%. LCx: Mild irregularities 30-40%. RCA: Mild irregularities 30-40%. Hypertension     Obesity due to excess calories 4/4/2016    DAINA (obstructive sleep apnea) 4/4/2016    Polyneuropathy     Dec 2010    Smoking 4/4/2016    TIA (transient ischemic attack) 2003 & 2004    Type 2 diabetes mellitus without complication [facility])        Past Surgical History:   Procedure Laterality Date    COLONOSCOPY      many many years ago     TOOTH EXTRACTION      yrs ago    UPPER GASTROINTESTINAL ENDOSCOPY N/A 5/16/2018    EGD BIOPSY performed by Yobani Thorpe MD at [address] [place] [address]  5/16/2018    EGD DILATION SAVORY performed by Yobani Thorpe MD at [address] [place] [address]  5/16/2018    EGD POLYP SNARE performed by Yobani Thorpe MD at [address] [place] [address]  05/16/2018    -javier,dilation,gastric polyp       Home meds:   Prior to Admission medications    Medication Sig Start Date End Date Taking? Authorizing Provider   potassium chloride (KLOR-CON M) 20 MEQ extended release tablet TAKE 1 TABLET BY MOUTH THREE TIMES DAILY 5/16/22   Cedric Lozano MD   gabapentin (NEURONTIN) 300 MG capsule TAKE 1 CAPSULE BY MOUTH THREE TIMES DAILY 6/27/22   Historical Provider, MD   glipiZIDE (GLUCOTROL) 5 MG tablet Take 1 tablet by mouth daily 7/7/22   Cedric Lozano MD   busPIRone (BUSPAR) 15 MG tablet Take 15 mg by mouth daily 5/23/22   Cedric Lozano MD   blood glucose monitor strips Test 1 times a day 4/28/22   Cedric Lozano MD   Blood Glucose Monitoring Suppl w/Device KIT 1 kit by Does not apply route Daily Please check blood glucose levels daily. 4/28/22   Cedric Lozano MD   Lancets Misc.  MISC 1 applicator by Does not apply route daily 4/25/22 Frederick Mcdonough MD   nystatin (MYCOSTATIN) [id number] UNIT/GM powder Apply 3 times daily. 4/24/22   Frederick Mcdonough MD   atorvastatin (LIPITOR) 40 MG tablet Take 1 tablet by mouth daily 4/22/22   Frederick Mcdonough MD   glucose monitoring (FREESTYLE FREEDOM) kit 1 kit by Does not apply route daily 4/22/22   Frederick Mcdonough MD   potassium chloride (KLOR-CON M) 20 MEQ TBCR extended release tablet Take 1 tablet by mouth 3 times daily 2/17/22   Frederick Mcdonough MD   pantoprazole (PROTONIX) 40 MG tablet Take 1 tablet by mouth 2 times daily 2/1/22   Frederick Mcdonough MD   benazepril (LOTENSIN) 5 MG tablet Take 1 tablet by mouth daily 9/7/21   Frederick Mcdonough MD   NONFORMULARY Tumeric    Historical Provider, MD   acetaminophen (TYLENOL) 500 MG tablet Take 1,000 mg by mouth daily as needed     Historical Provider, MD   ascorbic acid (VITAMIN C) 500 MG tablet Take 500 mg by mouth daily 3 in am and 2 at hs    Historical Provider, MD   Glucosamine-Chondroitin (GLUCOSAMINE CHONDR COMPLEX PO) Take by mouth    Historical Provider, MD   Cinnamon 500 MG CAPS Take 2,500 mg by mouth daily. Historical Provider, MD   aspirin 81 MG EC tablet Take 81 mg by mouth daily. Historical Provider, MD   Cyanocobalamin (B-12 PO) Take 1,000 mg by mouth. Pt states to be taking 2500 mg daily at this time. Historical Provider, MD   Omega-3 Fatty Acids (FISH OIL) 1000 MG CAPS Take 3,000 mg by mouth daily. Historical Provider, MD   loratadine (CLARITIN) 10 MG tablet Take 5 mg by mouth daily     Historical Provider, MD     Scheduled Meds:  Continuous Infusions:  PRN Meds:. Allergies   Allergen Reactions    Sulfa Antibiotics     Pcn [Penicillins] Rash       There were no vitals filed for this visit.     PHYSICAL EXAMINATION    Gen: NAD  HEENT: BCVA= 20/40, Glare testing= 20/200, Cataract severity/type-3+ Age-Related Nuclear Cataract-left eye, Other significant ocular findings= none  Pulm: CTA   Heart: RRR, no C/M/R/G  Abd: S/NT/ND  Neuro: no focal defecits    Assessment:   1. Age-Related Nuclear Cataract-left eye  2. ASA Score-2      Plan:   1. Risks, benefits, alternatives to surgery discussed with the patient and family. 2. All questions were answered to their satisfaction. 3. Ok to proceed with surgery as planned.     Benjamin Rankin, DO, DO

## 2022-07-25 NOTE — H&P
I have examined the patient and reviewed the history and physical completed on 7/24/22 and find no relevant changes. I have reviewed with the patient and/or family the risks, benefits, and alternatives to the procedure and they have agreed to proceed.     lCement Raza DO  7/25/2022  2:08 PM

## 2022-07-25 NOTE — H&P
I have examined the patient and reviewed the history and physical completed on 22 and find no relevant changes. I have reviewed with the patient and/or family the risks, benefits, and alternatives to the procedure and they have agreed to proceed.     Loria Apgar, DO  2022  8:58 AM

## 2022-07-25 NOTE — ANESTHESIA POSTPROCEDURE EVALUATION
Department of Anesthesiology  Postprocedure Note    Patient: Cheryl Grey  MRN: [id number]  YOB: 1950  Date of evaluation: 7/25/2022      Procedure Summary     Date: 07/25/22 Room / Location: [place] [address] / [facility]    Anesthesia Start: 6729 Anesthesia Stop: 1501    Procedure: EYE CATARACT EMULSIFICATION IOL IMPLANT (Left) Diagnosis:       Nuclear sclerotic cataract of both eyes      (AGE RELATED NUCLEAR CAT 3+NS)    Surgeons: Aldon Pallas, DO Responsible Provider: ALFREDO Sullivan CRNA    Anesthesia Type: MAC ASA Status: 3          Anesthesia Type: No value filed.     Cesar Phase I:      Cesar Phase II: Cesar Score: 10      Anesthesia Post Evaluation    Patient location during evaluation: PACU  Patient participation: complete - patient participated  Level of consciousness: awake and alert  Airway patency: patent  Nausea & Vomiting: no nausea and no vomiting  Complications: no  Cardiovascular status: blood pressure returned to baseline and hemodynamically stable  Respiratory status: acceptable and room air  Hydration status: euvolemic

## 2022-07-25 NOTE — OP NOTE
OPERATIVE NOTE      Patient:  Vincent Rebolledo    YOB: 1950    Account #:  [de-identified]    Date:  7/25/2022    Surgeon:  Mariam Dubon. Nabor Bach MD      Preoperative Diagnosis: Age-Related Nuclear Cataract- left eye    Postoperative Diagnosis: Same    Procedure: Phacoemulsification with intraocular lens implantation, left eye    Anesthesia: Topical and intracameral anesthesia with intravenous sedation    Complications: none    Specimens: none    Indications for procedure: The patient is a 67y.o. year old with decreased vision, glare and halos around lights, and trouble with activities of daily living. Examination revealed a visually significant cataract in the left eye. Other eye diseases have been ruled out as the primary cause of decreased visual function. Significant improvement is expected in the patient's visual acuity and/or visual function from the removal of the cataract. Risks, benefits, and alternatives to surgery were discussed with the patient and the patient elected to proceed with phacoemulsification with lens implantation. Details of the procedure: Following informed consent, the patient was identified by name and identification tag in the holding area,taken to the operating room and placed in the supine position. A time out was performed by all present in the room to identify the patient, the eye to be operated on, the correct operative procedure, and the correct intraocular lens. Monitoring leads were placed. The patient was positioned. An eyelid prep of half-strength Betadine was performed. The patient was administered intravenous sedation if desired and topical anesthetic was placed in the operative eye. The eye prepped a second time and draped in the usual sterile fashion using aseptic technique for cataract surgery. A lid speculum was then inserted. Ocucoat was placed onto the corneal surface.   A stab incision was made using a disposable blade into the anterior chamber. Intracameral preservative free xylocaine was placed into the anterior chamber. Amvisc was then used to replace the aqueous of the anterior chamber. A 2.2 mm keratome blade was used to make a 3-plane clear corneal incision into cornea depending on the patient's astigmatism. The cystitome was used to make a tear in the anterior capsule. Fine forceps were used to make a complete curvilinear capsulorrhexis. The lens was hydrodissected and freely rotated using a balanced salt filled syringe. The ultrasound handpiece was then used to emulsify the nucleus and epi nucleus. The residual cortex was then aspirated using the IA handpiece. The posterior capsule was polished. The eye was filled with viscoelastic and a foldable posterior chamber intraocular lens was injected into the capsular bag unless otherwise stated in the addendum. Irrigation/aspiration was used to remove all excess viscoelastic. The eye was pressurized and the wounds were check for leaks and none were found. A collagen shield, which had been soaked in antibiotic drops, was then placed onto the cornea. The lid speculum was removed. The eye was covered with a clear plastic shield. The patient was taken to the recovery area in excellent condition, having tolerated the procedure well, and will follow up with me tomorrow for postop care. ADDENDUM:  The phacoemulsification time 1:03.56 seconds @ 21% average ultrasound power for an effective phacoemulsification time of 13.79 seconds. The lens inserted was a model MX60 made by [employer] [employer] [employer] that was +20.5 diopters in power. The lens was inserted into the capsular bag utilizing the BLIS-X1 injector made by [facility] [employer].  The serial number on this lens was [id number]. There was no stitch placed to close this temporal posterior corneal incision. EBL was less than 1.0 ml. Chao Mccabe.  Neo Sutton DO

## 2022-08-05 RX ORDER — ATORVASTATIN CALCIUM 40 MG/1
40 TABLET, FILM COATED ORAL DAILY
Qty: 90 TABLET | Refills: 0 | Status: SHIPPED | OUTPATIENT
Start: 2022-08-05

## 2022-08-15 RX ORDER — POTASSIUM CHLORIDE 20 MEQ/1
TABLET, EXTENDED RELEASE ORAL
Qty: 90 TABLET | Refills: 1 | Status: SHIPPED | OUTPATIENT
Start: 2022-08-15 | End: 2022-10-10 | Stop reason: SDUPTHER

## 2022-08-15 RX ORDER — FUROSEMIDE 20 MG/1
40 TABLET ORAL DAILY
Qty: 180 TABLET | Refills: 0 | Status: SHIPPED | OUTPATIENT
Start: 2022-08-15

## 2022-08-28 RX ORDER — BUSPIRONE HYDROCHLORIDE 15 MG/1
15 TABLET ORAL DAILY
Qty: 90 TABLET | Refills: 0 | Status: SHIPPED | OUTPATIENT
Start: 2022-08-28

## 2022-08-30 NOTE — PROGRESS NOTES
Patient received NPO instructions and pre-op medication instructions to be taken on the day of the procedure with a small sip of water. Pt was also given pre-op eye drop instructions from Dr. Gamboa Beam office.

## 2022-09-09 ENCOUNTER — ANESTHESIA EVENT (OUTPATIENT)
Dept: OPERATING ROOM | Age: 72
End: 2022-09-09
Payer: MEDICARE

## 2022-09-11 PROBLEM — H40.1111 PRIMARY OPEN ANGLE GLAUCOMA (POAG) OF RIGHT EYE, MILD STAGE: Chronic | Status: ACTIVE | Noted: 2022-09-11

## 2022-09-11 PROBLEM — H25.11 AGE-RELATED NUCLEAR CATARACT OF RIGHT EYE: Chronic | Status: ACTIVE | Noted: 2022-09-11

## 2022-09-12 ENCOUNTER — HOSPITAL ENCOUNTER (OUTPATIENT)
Age: 72
Setting detail: OUTPATIENT SURGERY
Discharge: HOME | End: 2022-09-12
Attending: OPHTHALMOLOGY | Admitting: OPHTHALMOLOGY
Payer: MEDICARE

## 2022-09-12 ENCOUNTER — ANESTHESIA (OUTPATIENT)
Dept: OPERATING ROOM | Age: 72
End: 2022-09-12
Payer: MEDICARE

## 2022-09-12 VITALS
WEIGHT: 250 LBS | HEART RATE: 62 BPM | RESPIRATION RATE: 18 BRPM | BODY MASS INDEX: 37.89 KG/M2 | HEIGHT: 68 IN | DIASTOLIC BLOOD PRESSURE: 71 MMHG | TEMPERATURE: 97.6 F | OXYGEN SATURATION: 97 % | SYSTOLIC BLOOD PRESSURE: 155 MMHG

## 2022-09-12 PROBLEM — H25.11 AGE-RELATED NUCLEAR CATARACT OF RIGHT EYE: Chronic | Status: RESOLVED | Noted: 2022-09-11 | Resolved: 2022-09-12

## 2022-09-12 LAB — GLUCOSE BLD-MCNC: 123 MG/DL (ref 74–100)

## 2022-09-12 PROCEDURE — C1783 OCULAR IMP, AQUEOUS DRAIN DE: HCPCS | Performed by: OPHTHALMOLOGY

## 2022-09-12 PROCEDURE — 3700000001 HC ADD 15 MINUTES (ANESTHESIA): Performed by: OPHTHALMOLOGY

## 2022-09-12 PROCEDURE — 6370000000 HC RX 637 (ALT 250 FOR IP): Performed by: OPHTHALMOLOGY

## 2022-09-12 PROCEDURE — 2580000003 HC RX 258: Performed by: NURSE ANESTHETIST, CERTIFIED REGISTERED

## 2022-09-12 PROCEDURE — 6360000002 HC RX W HCPCS: Performed by: OPHTHALMOLOGY

## 2022-09-12 PROCEDURE — V2632 POST CHMBR INTRAOCULAR LENS: HCPCS | Performed by: OPHTHALMOLOGY

## 2022-09-12 PROCEDURE — 7100000010 HC PHASE II RECOVERY - FIRST 15 MIN: Performed by: OPHTHALMOLOGY

## 2022-09-12 PROCEDURE — 6360000002 HC RX W HCPCS: Performed by: NURSE ANESTHETIST, CERTIFIED REGISTERED

## 2022-09-12 PROCEDURE — 82947 ASSAY GLUCOSE BLOOD QUANT: CPT

## 2022-09-12 PROCEDURE — 3600000013 HC SURGERY LEVEL 3 ADDTL 15MIN: Performed by: OPHTHALMOLOGY

## 2022-09-12 PROCEDURE — 2500000003 HC RX 250 WO HCPCS: Performed by: OPHTHALMOLOGY

## 2022-09-12 PROCEDURE — 3600000003 HC SURGERY LEVEL 3 BASE: Performed by: OPHTHALMOLOGY

## 2022-09-12 PROCEDURE — 2709999900 HC NON-CHARGEABLE SUPPLY: Performed by: OPHTHALMOLOGY

## 2022-09-12 PROCEDURE — 7100000011 HC PHASE II RECOVERY - ADDTL 15 MIN: Performed by: OPHTHALMOLOGY

## 2022-09-12 PROCEDURE — 3700000000 HC ANESTHESIA ATTENDED CARE: Performed by: OPHTHALMOLOGY

## 2022-09-12 DEVICE — LENS INTRAOCULAR BCNVX 20.5+ DIOPT 6X12.5 MM ACRYL ENVISTA: Type: IMPLANTABLE DEVICE | Site: EYE | Status: FUNCTIONAL

## 2022-09-12 DEVICE — SYSTEM MIC BYPS AD H360UM DIA230UM STEARALKONIUM HEP TI: Type: IMPLANTABLE DEVICE | Site: EYE | Status: FUNCTIONAL

## 2022-09-12 RX ORDER — PHENYLEPHRINE HCL 2.5 %
1 DROPS OPHTHALMIC (EYE) SEE ADMIN INSTRUCTIONS
Status: DISCONTINUED | OUTPATIENT
Start: 2022-09-12 | End: 2022-09-12 | Stop reason: HOSPADM

## 2022-09-12 RX ORDER — SODIUM CHLORIDE, SODIUM LACTATE, POTASSIUM CHLORIDE, CALCIUM CHLORIDE 600; 310; 30; 20 MG/100ML; MG/100ML; MG/100ML; MG/100ML
INJECTION, SOLUTION INTRAVENOUS CONTINUOUS
Status: DISCONTINUED | OUTPATIENT
Start: 2022-09-12 | End: 2022-09-12 | Stop reason: HOSPADM

## 2022-09-12 RX ORDER — FENTANYL CITRATE 50 UG/ML
INJECTION, SOLUTION INTRAMUSCULAR; INTRAVENOUS PRN
Status: DISCONTINUED | OUTPATIENT
Start: 2022-09-12 | End: 2022-09-12 | Stop reason: SDUPTHER

## 2022-09-12 RX ORDER — PROPARACAINE HYDROCHLORIDE 5 MG/ML
1 SOLUTION/ DROPS OPHTHALMIC SEE ADMIN INSTRUCTIONS
Status: DISCONTINUED | OUTPATIENT
Start: 2022-09-12 | End: 2022-09-12 | Stop reason: HOSPADM

## 2022-09-12 RX ORDER — SODIUM CHLORIDE 9 MG/ML
INJECTION, SOLUTION INTRAVENOUS PRN
Status: DISCONTINUED | OUTPATIENT
Start: 2022-09-12 | End: 2022-09-12 | Stop reason: HOSPADM

## 2022-09-12 RX ORDER — TETRACAINE HYDROCHLORIDE 5 MG/ML
SOLUTION OPHTHALMIC PRN
Status: DISCONTINUED | OUTPATIENT
Start: 2022-09-12 | End: 2022-09-12 | Stop reason: ALTCHOICE

## 2022-09-12 RX ORDER — SODIUM CHLORIDE 0.9 % (FLUSH) 0.9 %
5-40 SYRINGE (ML) INJECTION EVERY 12 HOURS SCHEDULED
Status: DISCONTINUED | OUTPATIENT
Start: 2022-09-12 | End: 2022-09-12 | Stop reason: HOSPADM

## 2022-09-12 RX ORDER — MIDAZOLAM HYDROCHLORIDE 1 MG/ML
INJECTION INTRAMUSCULAR; INTRAVENOUS PRN
Status: DISCONTINUED | OUTPATIENT
Start: 2022-09-12 | End: 2022-09-12 | Stop reason: SDUPTHER

## 2022-09-12 RX ORDER — TETRACAINE HYDROCHLORIDE 5 MG/ML
1 SOLUTION OPHTHALMIC SEE ADMIN INSTRUCTIONS
Status: DISCONTINUED | OUTPATIENT
Start: 2022-09-12 | End: 2022-09-12 | Stop reason: HOSPADM

## 2022-09-12 RX ORDER — TROPICAMIDE 10 MG/ML
1 SOLUTION/ DROPS OPHTHALMIC SEE ADMIN INSTRUCTIONS
Status: DISCONTINUED | OUTPATIENT
Start: 2022-09-12 | End: 2022-09-12 | Stop reason: HOSPADM

## 2022-09-12 RX ORDER — SODIUM CHLORIDE 9 MG/ML
INJECTION, SOLUTION INTRAVENOUS CONTINUOUS
Status: DISCONTINUED | OUTPATIENT
Start: 2022-09-12 | End: 2022-09-12 | Stop reason: HOSPADM

## 2022-09-12 RX ORDER — LIDOCAINE HYDROCHLORIDE 10 MG/ML
INJECTION, SOLUTION EPIDURAL; INFILTRATION; INTRACAUDAL; PERINEURAL PRN
Status: DISCONTINUED | OUTPATIENT
Start: 2022-09-12 | End: 2022-09-12 | Stop reason: ALTCHOICE

## 2022-09-12 RX ORDER — SODIUM CHLORIDE 0.9 % (FLUSH) 0.9 %
5-40 SYRINGE (ML) INJECTION PRN
Status: DISCONTINUED | OUTPATIENT
Start: 2022-09-12 | End: 2022-09-12 | Stop reason: HOSPADM

## 2022-09-12 RX ADMIN — PROPARACAINE HYDROCHLORIDE 1 DROP: 5 SOLUTION/ DROPS OPHTHALMIC at 13:52

## 2022-09-12 RX ADMIN — FENTANYL CITRATE 50 MCG: 50 INJECTION INTRAMUSCULAR; INTRAVENOUS at 14:13

## 2022-09-12 RX ADMIN — PROPARACAINE HYDROCHLORIDE 1 DROP: 5 SOLUTION/ DROPS OPHTHALMIC at 13:40

## 2022-09-12 RX ADMIN — TROPICAMIDE 1 DROP: 10 SOLUTION/ DROPS OPHTHALMIC at 13:53

## 2022-09-12 RX ADMIN — TROPICAMIDE 1 DROP: 10 SOLUTION/ DROPS OPHTHALMIC at 13:47

## 2022-09-12 RX ADMIN — TETRACAINE HYDROCHLORIDE 1 DROP: 5 SOLUTION OPHTHALMIC at 14:06

## 2022-09-12 RX ADMIN — MIDAZOLAM 2 MG: 1 INJECTION INTRAMUSCULAR; INTRAVENOUS at 14:10

## 2022-09-12 RX ADMIN — FENTANYL CITRATE 50 MCG: 50 INJECTION INTRAMUSCULAR; INTRAVENOUS at 14:11

## 2022-09-12 RX ADMIN — PHENYLEPHRINE HYDROCHLORIDE 1 DROP: 25 SOLUTION/ DROPS OPHTHALMIC at 13:47

## 2022-09-12 RX ADMIN — PROPARACAINE HYDROCHLORIDE 1 DROP: 5 SOLUTION/ DROPS OPHTHALMIC at 13:47

## 2022-09-12 RX ADMIN — PHENYLEPHRINE HYDROCHLORIDE 1 DROP: 25 SOLUTION/ DROPS OPHTHALMIC at 13:40

## 2022-09-12 RX ADMIN — PHENYLEPHRINE HYDROCHLORIDE 1 DROP: 25 SOLUTION/ DROPS OPHTHALMIC at 13:53

## 2022-09-12 RX ADMIN — SODIUM CHLORIDE, POTASSIUM CHLORIDE, SODIUM LACTATE AND CALCIUM CHLORIDE: 600; 310; 30; 20 INJECTION, SOLUTION INTRAVENOUS at 14:06

## 2022-09-12 RX ADMIN — TROPICAMIDE 1 DROP: 10 SOLUTION/ DROPS OPHTHALMIC at 13:40

## 2022-09-12 ASSESSMENT — PAIN DESCRIPTION - ORIENTATION: ORIENTATION: RIGHT

## 2022-09-12 ASSESSMENT — PAIN DESCRIPTION - DESCRIPTORS: DESCRIPTORS: BURNING

## 2022-09-12 ASSESSMENT — PAIN DESCRIPTION - PAIN TYPE: TYPE: SURGICAL PAIN

## 2022-09-12 ASSESSMENT — PAIN DESCRIPTION - LOCATION: LOCATION: EYE

## 2022-09-12 ASSESSMENT — LIFESTYLE VARIABLES: SMOKING_STATUS: 0

## 2022-09-12 ASSESSMENT — PAIN SCALES - GENERAL: PAINLEVEL_OUTOF10: 8

## 2022-09-12 NOTE — OP NOTE
Patient:  Mindy Caballero    YOB: 1950    Account #:  [de-identified]    Date:  9/12/2022    Surgeon:  Lisa Leal. Elías Platt MD      Preoperative Diagnosis: Age-Related Nuclear Cataract- right eye, Primary open angle glaucoma - right eye    Postoperative Diagnosis: Same    Procedure: Phacoemulsification with intraocular lens implantation, right eye, iStent inject, right eye    Anesthesia: Topical and intracameral anesthesia with intravenous sedation    Complications: none    Specimens: none    Indications for procedure: The patient is a 67y.o. year old with decreased vision, glare and halos around lights, and trouble with activities of daily living. Examination revealed a visually significant cataract in the right eye. Other eye diseases have been ruled out as the primary cause of decreased visual function. Significant improvement is expected in the patient's visual acuity and/or visual function from the removal of the cataract. Risks, benefits, and alternatives to surgery were discussed with the patient and the patient elected to proceed with phacoemulsification with lens implantation. Details of the procedure: Following informed consent, the patient was identified by name and identification tag in the holding area,taken to the operating room and placed in the supine position. A time out was performed by all present in the room to identify the patient, the eye to be operated on, the correct operative procedure, and the correct intraocular lens. Monitoring leads were placed. The patient was positioned. An eyelid prep of half-strength Betadine was performed. The patient was administered intravenous sedation if desired and topical anesthetic was placed in the operative eye. The eye prepped a second time and draped in the usual sterile fashion using aseptic technique for cataract surgery. A lid speculum was then inserted.   Ocucoat was placed onto the corneal surface. A stab incision was made using a disposable blade into the anterior chamber. Intracameral preservative free xylocaine was placed into the anterior chamber. Amvisc was then used to replace the aqueous of the anterior chamber. A 2.2 mm keratome blade was used to make a 3-plane clear corneal incision into cornea depending on the patient's astigmatism. The cystitome was used to make a tear in the anterior capsule. Fine forceps were used to make a complete curvilinear capsulorrhexis. The lens was hydrodissected and freely rotated using a balanced salt filled syringe. The ultrasound handpiece was then used to emulsify the nucleus and epi nucleus. The residual cortex was then aspirated using the IA handpiece. The posterior capsule was polished. The eye was filled with viscoelastic and a foldable posterior chamber intraocular lens was injected into the capsular bag unless otherwise stated in the addendum. The patient's head and the microscope were then rotated 45 degrees away. The hands free gonioscopy lens was placed on the eye to visual the trabecular meshwork. Two istent inject implants were then injected into the trabecular meshwork approximately 2 clock hours apart. Irrigation/aspiration was used to remove all excess viscoelastic. The eye was pressurized and the wounds were check for leaks and none were found. A collagen shield, which had been soaked in antibiotic drops, was then placed onto the cornea. The lid speculum was removed. The eye was covered with a clear plastic shield. The patient was taken to the recovery area in excellent condition, having tolerated the procedure well, and will follow up with me tomorrow for postop care. ADDENDUM:  The phacoemulsification time 1:11.38 seconds @ 19% average ultrasound power for an effective phacoemulsification time of 14.13 seconds. The lens inserted was a model MX60 made by [facility] [employer] that was +20.5 diopters in power. The lens was inserted into the capsular bag utilizing the BLIS-X1 injector made by [facility] [employer].  The serial number on this lens was [id number]. The serial number on the iStent inject was [id number]. There was no stitch placed to close this temporal posterior corneal incision. EBL was less than 1.0 ml. Kayleigh Alatorre.  Al Mccracken DO

## 2022-09-12 NOTE — ANESTHESIA PRE PROCEDURE
Department of Anesthesiology  Preprocedure Note       Name:  Mindy Groves   Age:  67 y.o.  :  1950                                          MRN:  [id number]         Date:  2022      Surgeon: Yadiel Bhatt):  Sujata Cowart DO    Procedure: Procedure(s):  EYE TRABECULAR MICRO BYPASS  EYE CATARACT EMULSIFICATION IOL IMPLANT-I STENT    Medications prior to admission:   Prior to Admission medications    Medication Sig Start Date End Date Taking? Authorizing Provider   busPIRone (BUSPAR) 15 MG tablet Take 15 mg by mouth daily 22   Stacey Burton MD   potassium chloride (KLOR-CON M) 20 MEQ extended release tablet TAKE 1 TABLET BY MOUTH THREE TIMES DAILY 8/15/22   Stacey Burton MD   furosemide (LASIX) 20 MG tablet Take 2 tablets by mouth in the morning. 8/15/22   Stacey Burton MD   atorvastatin (LIPITOR) 40 MG tablet Take 1 tablet by mouth in the morning. 22   Stacey Burton MD   gabapentin (NEURONTIN) 300 MG capsule TAKE 1 CAPSULE BY MOUTH THREE TIMES DAILY 22   Historical Provider, MD   glipiZIDE (GLUCOTROL) 5 MG tablet Take 1 tablet by mouth daily 22   Stacey Burton MD   blood glucose monitor strips Test 1 times a day 22   Stacey Bruton MD   Blood Glucose Monitoring Suppl w/Device KIT 1 kit by Does not apply route Daily Please check blood glucose levels daily. 22   Stacey Burton MD   Lancets Misc. MISC 1 applicator by Does not apply route daily 22   Stacey Burton MD   nystatin (MYCOSTATIN) 599002 UNIT/GM powder Apply 3 times daily.  22   Stacey Burton MD   glucose monitoring (FREESTYLE FREEDOM) kit 1 kit by Does not apply route daily 22   Stacey Burton MD   potassium chloride (KLOR-CON M) 20 MEQ TBCR extended release tablet Take 1 tablet by mouth 3 times daily 22   Stacey Burton MD   pantoprazole (PROTONIX) 40 MG tablet Take 1 tablet by mouth 2 times daily 22   Stacey Burton MD   benazepril (LOTENSIN) 5 MG tablet Take 1 tablet by mouth daily 9/7/21   Koby Bee MD   NONFORMULARY Tumeric    Historical Provider, MD   acetaminophen (TYLENOL) 500 MG tablet Take 1,000 mg by mouth daily as needed     Historical Provider, MD   ascorbic acid (VITAMIN C) 500 MG tablet Take 500 mg by mouth daily 3 in am and 2 at hs    Historical Provider, MD   Glucosamine-Chondroitin (GLUCOSAMINE CHONDR COMPLEX PO) Take by mouth    Historical Provider, MD   Cinnamon 500 MG CAPS Take 2,500 mg by mouth daily. Historical Provider, MD   aspirin 81 MG EC tablet Take 81 mg by mouth daily. Historical Provider, MD   Cyanocobalamin (B-12 PO) Take 1,000 mg by mouth. Pt states to be taking 2500 mg daily at this time. Historical Provider, MD   Omega-3 Fatty Acids (FISH OIL) 1000 MG CAPS Take 3,000 mg by mouth daily. Historical Provider, MD   loratadine (CLARITIN) 10 MG tablet Take 5 mg by mouth daily     Historical Provider, MD       Current medications:    No current facility-administered medications for this visit. No current outpatient medications on file.      Facility-Administered Medications Ordered in Other Visits   Medication Dose Route Frequency Provider Last Rate Last Admin    lactated ringers infusion   IntraVENous Continuous Leonardo Hannah, APRN - CRNA        0.9 % sodium chloride infusion   IntraVENous Continuous Denys Wang,         sodium chloride flush 0.9 % injection 5-40 mL  5-40 mL IntraVENous 2 times per day Denys Wang, DO        sodium chloride flush 0.9 % injection 5-40 mL  5-40 mL IntraVENous PRN Denys Wang, DO        0.9 % sodium chloride infusion   IntraVENous PRN Denys Wang, DO        proparacaine (ALCAINE) 0.5 % ophthalmic solution 1 drop  1 drop Right Eye See Admin Instructions Denys Wang DO   1 drop at 09/12/22 1352    tetracaine (TETRAVISC) 0.5 % ophthalmic solution 1 drop  1 drop Right Eye See Admin Instructions Denys Wang DO        tropicamide (MYDRIACYL) 1 % ophthalmic solution 1 drop  1 drop Right Eye See Admin Instructions Veneda Royalty, DO   1 drop at 09/12/22 1353    phenylephrine (MYDFRIN) 2.5 % ophthalmic solution 1 drop  1 drop Right Eye See Admin Instructions Veneda Royalty, DO   1 drop at 09/12/22 1353       Allergies: Allergies   Allergen Reactions    Sulfa Antibiotics     Pcn [Penicillins] Rash       Problem List:    Patient Active Problem List   Diagnosis Code    Chest pain R07.9    Obesity E66.9    Personal history of TIA (transient ischemic attack) Z86.73    Mitral regurgitation I34.0    Cellulitis L03.90    Bilateral leg pain M79.604, M79.605    Bilateral foot pain M79.671, M79.672    Low back pain M54.50    Essential hypertension I10    Chronic diastolic heart failure ([facility]) I50.32    DAINA (obstructive sleep apnea) G47.33    Obesity due to excess calories E66.09    Smoking F17.200    Chronic pain G89.29    H/O echocardiogram Z92.89    Chronic obstructive pulmonary disease ([facility]) J44.9    Aortic aneurysm, thoracic ([facility]) I71.2    Thoracic aortic aneurysm without rupture ([facility]) I71.2    Depression F32. A    Esophageal dysphagia R13.19    Gastroesophageal reflux disease K21.9    Gastric polyp K31.7    Type 2 diabetes mellitus without complication, without long-term current use of insulin ([facility]) E11.9    Dyslipidemia E78.5    Coronary atherosclerosis I25.10    Type 2 diabetes mellitus with diabetic neuropathy E11.40    Age-related nuclear cataract of right eye H25.11    Primary open angle glaucoma (POAG) of right eye, mild stage H40.1111       Past Medical History:        Diagnosis Date    Aortic aneurysm, thoracic ([facility] 7/18/2016    Ataxia     Gait ataxia x 1 yr ago (approx. 2010)    CHF (congestive heart failure) ([facility])     COPD (chronic obstructive pulmonary disease) ([facility] 4/4/2016    H/O cardiovascular stress test 08/28/2018    Equivocal myocardial perfusion study.  There is a small/ moderate perfusion defect of mild intestiy in the inferoseptal and septal regions during stress imaging which is most consistent with ischemia but may be due to artifact. Overall, these results are most consistent with a low risk for CAD.     H/O echocardiogram 06/30/2016    EF >60%. Mild LV hypertrophy with a normal LV cavity size. Aortic leaflet calcification with moderate aortic stenosis with a mean greadient of 20 mmHg. Mild pulmonary hypertension estimated right ventricular systolic pressure of 37 mmHg. Mild tricuspid regurgitation. Moderate diastolic dysfunction.  H/O echocardiogram 08/28/2018    EF >55%. The LV wall thickness is mildly increased. The pt has a sigmoid interventricular septum without evidence of outflow tract obstruction. Mild to modereate mitral regurg. Mild to tricuspid regurg. Evidence of mild (grade I) diastolic dysfunction is seen. The aortic root is considered to be at the upper limits of normal in size when corrected for body surface area.  Heart failure ([address] 2003    History of echocardiogram 11/20/2014    Global left ventricular systolic function appears to be hyperdynamic with EF of  > 70% Left atrium is mildly dilated (29-33) with left atrial volume index of 31 ml/m2. Moderate regurgitation, mild tricuspid regurgitation. Evidence of moderate (gradeI II )  diastolic dysfunction is seen.  History of stress test 12/30/15    Normal. EF 63%. Low risk for significant CAD.  Hx of cardiac cath 07/21/2016    LMCA: Mild irregularites 10-20%. LAD: Mild irregularities 40-50%. LCx: Mild irregularities 30-40%. RCA: Mild irregularities 30-40%.     Hypertension     Obesity due to excess calories 4/4/2016    DAINA (obstructive sleep apnea) 4/4/2016    Polyneuropathy     Dec 2010    Smoking 4/4/2016    TIA (transient ischemic attack) 2003 & 2004    Type 2 diabetes mellitus without complication [facility])        Past Surgical History:        Procedure Laterality Date    COLONOSCOPY      many many years ago     INTRACAPSULAR CATARACT EXTRACTION Left 7/25/2022    EYE CATARACT EMULSIFICATION IOL IMPLANT performed by Kevin Nagel DO at [facility]      yrs ago   [facility] 5/16/2018    EGD BIOPSY performed by Prisca Causey MD at [facility]  5/16/2018    EGD DILATION SAVORY performed by Prisca Causey MD at [facility]  5/16/2018    EGD POLYP SNARE performed by Prisca Causey MD at [address] [place] [address]  05/16/2018    -bx,dilation,gastric polyp       Social History:    Social History     Tobacco Use    Smoking status: Former     Packs/day: 0.00     Years: 50.00     Pack years: 0.00     Types: Cigarettes     Quit date: 01/2019     Years since quitting: 3.6    Smokeless tobacco: Never    Tobacco comments:     on patches   Substance Use Topics    Alcohol use: No                                Counseling given: Not Answered  Tobacco comments: on patches      Vital Signs (Current): There were no vitals filed for this visit.                                            BP Readings from Last 3 Encounters:   07/25/22 124/81   07/22/22 132/77   04/22/22 121/79       NPO Status:                                                                                 BMI:   Wt Readings from Last 3 Encounters:   08/30/22 250 lb (113.4 kg)   07/25/22 248 lb (112.5 kg)   07/22/22 251 lb 3.2 oz (113.9 kg)     There is no height or weight on file to calculate BMI.    CBC:   Lab Results   Component Value Date/Time    WBC 7.2 05/19/2020 02:28 PM    RBC 4.24 05/19/2020 02:28 PM    HGB 12.7 05/19/2020 02:28 PM    HCT 39.5 05/19/2020 02:28 PM    MCV 93.2 05/19/2020 02:28 PM    RDW 13.6 05/19/2020 02:28 PM     05/19/2020 02:28 PM       CMP:   Lab Results   Component Value Date/Time     11/15/2021 12:49 PM    K 4.2 11/15/2021 12:49 PM     11/15/2021 12:49 PM    CO2 26 11/15/2021 12:49 PM    BUN 13 12/02/2021 01:59 PM    CREATININE 0.73 12/02/2021 01:59 PM    GFRAA >60 12/02/2021 01:59 PM    LABGLOM >60 12/02/2021 01:59 PM    GLUCOSE 159 11/15/2021 12:49 PM    PROT 6.0 08/03/2018 06:16 AM    CALCIUM 9.1 11/15/2021 12:49 PM    BILITOT 0.89 08/03/2018 06:16 AM    ALKPHOS 83 08/03/2018 06:16 AM    AST 37 08/03/2018 06:16 AM    ALT 51 08/03/2018 06:16 AM       POC Tests: No results for input(s): POCGLU, POCNA, POCK, POCCL, POCBUN, POCHEMO, POCHCT in the last 72 hours. Coags:   Lab Results   Component Value Date/Time    PROTIME 10.7 06/30/2016 11:22 AM    INR 1.0 06/30/2016 11:22 AM    APTT 27.6 11/01/2013 10:30 PM       HCG (If Applicable): No results found for: PREGTESTUR, PREGSERUM, HCG, HCGQUANT     ABGs: No results found for: PHART, PO2ART, EOQ2QDZ, DRI9EVO, BEART, W4LQAOWG     Type & Screen (If Applicable):  No results found for: LABABO, LABRH    Drug/Infectious Status (If Applicable):  No results found for: HIV, HEPCAB    COVID-19 Screening (If Applicable): No results found for: COVID19        Anesthesia Evaluation  Patient summary reviewed and Nursing notes reviewed no history of anesthetic complications:   Airway: Mallampati: II  TM distance: >3 FB   Neck ROM: full  Mouth opening: > = 3 FB   Dental:    (+) upper dentures and lower dentures      Pulmonary:normal exam    (+) COPD:      (-) sleep apnea and not a current smoker                           Cardiovascular:  Exercise tolerance: poor (<4 METS),   (+) hypertension:, CAD:, CHF:, hyperlipidemia      ECG reviewed      Echocardiogram reviewed               ROS comment: Summary  Global left ventricular systolic function appears preserved with an  estimated ejection fraction of >55%. The left ventricular cavity size is within normal limits and the left  ventricular wall thickness is mildly increased. The patient has a sigmoid interventricular septum without evidence of  outflow tract obstruction.   Normal mitral valve structure with mild to moderate

## 2022-09-12 NOTE — H&P
I have examined the patient and reviewed the history and physical completed on 9/11/22 and find no relevant changes. I have reviewed with the patient and/or family the risks, benefits, and alternatives to the procedure and they have agreed to proceed.     Jake Bond DO  9/12/2022  1:51 PM

## 2022-09-12 NOTE — H&P
Anthony Taylor is a 67y.o. year old who presents for elective outpatient ophthalmic surgery with Isidro Frankel DO. The patient complains of decreased vision, glare and halos around lights, and trouble with vision for activities of daily living. Past Medical History:   Diagnosis Date    Aortic aneurysm, thoracic ([facility] 7/18/2016    Ataxia     Gait ataxia x 1 yr ago (approx. 2010)    CHF (congestive heart failure) ([facility])     COPD (chronic obstructive pulmonary disease) ([facility] 4/4/2016    H/O cardiovascular stress test 08/28/2018    Equivocal myocardial perfusion study. There is a small/ moderate perfusion defect of mild intestiy in the inferoseptal and septal regions during stress imaging which is most consistent with ischemia but may be due to artifact. Overall, these results are most consistent with a low risk for CAD. H/O echocardiogram 06/30/2016    EF >60%. Mild LV hypertrophy with a normal LV cavity size. Aortic leaflet calcification with moderate aortic stenosis with a mean greadient of 20 mmHg. Mild pulmonary hypertension estimated right ventricular systolic pressure of 37 mmHg. Mild tricuspid regurgitation. Moderate diastolic dysfunction. H/O echocardiogram 08/28/2018    EF >55%. The LV wall thickness is mildly increased. The pt has a sigmoid interventricular septum without evidence of outflow tract obstruction. Mild to modereate mitral regurg. Mild to tricuspid regurg. Evidence of mild (grade I) diastolic dysfunction is seen. The aortic root is considered to be at the upper limits of normal in size when corrected for body surface area. Heart failure ([address] 2003    History of echocardiogram 11/20/2014    Global left ventricular systolic function appears to be hyperdynamic with EF of  > 70% Left atrium is mildly dilated (29-33) with left atrial volume index of 31 ml/m2. Moderate regurgitation, mild tricuspid regurgitation.  Evidence of moderate (gradeI II )  diastolic dysfunction is seen. History of stress test 12/30/15    Normal. EF 63%. Low risk for significant CAD. Hx of cardiac cath 07/21/2016    LMCA: Mild irregularites 10-20%. LAD: Mild irregularities 40-50%. LCx: Mild irregularities 30-40%. RCA: Mild irregularities 30-40%. Hypertension     Obesity due to excess calories 4/4/2016    DAINA (obstructive sleep apnea) 4/4/2016    Polyneuropathy     Dec 2010    Smoking 4/4/2016    TIA (transient ischemic attack) 2003 & 2004    Type 2 diabetes mellitus without complication [facility])        Past Surgical History:   Procedure Laterality Date    COLONOSCOPY      many many years ago     INTRACAPSULAR CATARACT EXTRACTION Left 7/25/2022    EYE CATARACT EMULSIFICATION IOL IMPLANT performed by Yvonne Davis DO at [address]      yrs ago    [address] N/A 5/16/2018    EGD BIOPSY performed by Delford Homans, MD at [address]  5/16/2018    EGD DILATION SAVORY performed by Delford Homans, MD at [address]  5/16/2018    EGD POLYP SNARE performed by Delford Homans, MD at [address]  05/16/2018    -bx,dilation,gastric polyp       Home meds:   Prior to Admission medications    Medication Sig Start Date End Date Taking? Authorizing Provider   busPIRone (BUSPAR) 15 MG tablet Take 15 mg by mouth daily 8/28/22   Frederick Mcdonough MD   potassium chloride (KLOR-CON M) 20 MEQ extended release tablet TAKE 1 TABLET BY MOUTH THREE TIMES DAILY 8/15/22   Frederick Mcdonough MD   furosemide (LASIX) 20 MG tablet Take 2 tablets by mouth in the morning. 8/15/22   Frederick Mcdonough MD   atorvastatin (LIPITOR) 40 MG tablet Take 1 tablet by mouth in the morning.  8/5/22   Frederick Mcdonough MD   gabapentin (NEURONTIN) 300 MG capsule TAKE 1 CAPSULE BY MOUTH THREE TIMES DAILY 6/27/22   Historical Provider, MD   glipiZIDE (GLUCOTROL) 5 MG tablet Take 1 tablet by mouth daily 7/7/22   [address], MD   blood glucose monitor strips Test 1 times a day 4/28/22   [address], MD   Blood Glucose Monitoring Suppl w/Device KIT 1 kit by Does not apply route Daily Please check blood glucose levels daily. 4/28/22   [address], MD   Lancets Misc. MISC 1 applicator by Does not apply route daily 4/25/22   [address], MD   nystatin (MYCOSTATIN) 954042 UNIT/GM powder Apply 3 times daily. 4/24/22   [address], MD   glucose monitoring (FREESTYLE FREEDOM) kit 1 kit by Does not apply route daily 4/22/22   [address], MD   potassium chloride (KLOR-CON M) 20 MEQ TBCR extended release tablet Take 1 tablet by mouth 3 times daily 2/17/22   [address], MD   pantoprazole (PROTONIX) 40 MG tablet Take 1 tablet by mouth 2 times daily 2/1/22   [address], MD   benazepril (LOTENSIN) 5 MG tablet Take 1 tablet by mouth daily 9/7/21   [facility] [address], MD   NONFORMULARY Tumeric    Historical Provider, MD   acetaminophen (TYLENOL) 500 MG tablet Take 1,000 mg by mouth daily as needed     Historical Provider, MD   ascorbic acid (VITAMIN C) 500 MG tablet Take 500 mg by mouth daily 3 in am and 2 at hs    Historical Provider, MD   Glucosamine-Chondroitin (GLUCOSAMINE CHONDR COMPLEX PO) Take by mouth    Historical Provider, MD   Cinnamon 500 MG CAPS Take 2,500 mg by mouth daily. Historical Provider, MD   aspirin 81 MG EC tablet Take 81 mg by mouth daily. Historical Provider, MD   Cyanocobalamin (B-12 PO) Take 1,000 mg by mouth. Pt states to be taking 2500 mg daily at this time. Historical Provider, MD   Omega-3 Fatty Acids (FISH OIL) 1000 MG CAPS Take 3,000 mg by mouth daily. Historical Provider, MD   loratadine (CLARITIN) 10 MG tablet Take 5 mg by mouth daily     Historical Provider, MD     Scheduled Meds:  Continuous Infusions:  PRN Meds:.     Allergies   Allergen Reactions    Sulfa Antibiotics     Pcn [Penicillins] Rash       There were no vitals filed for this visit. PHYSICAL EXAMINATION    Gen: NAD  HEENT: BCVA= 20/40, Glare testing= 20/200, Cataract severity/type-3+ Age-related Nuclear Cataract-right eye, Other significant ocular findings= mild POAG OD  Pulm: CTA   Heart: RRR, no C/M/R/G  Abd: S/NT/ND  Neuro: no focal defecits    Assessment:   1. Age-related Nuclear Cataract-right eye  2. ASA Score-2  3. Mild POAG OD    Plan:   1. Risks, benefits, alternatives to surgery discussed with the patient and family. 2. All questions were answered to their satisfaction. 3. Ok to proceed with surgery as planned.     Dillon Gore, , DO

## 2022-09-12 NOTE — PROGRESS NOTES

## 2022-09-12 NOTE — ANESTHESIA POSTPROCEDURE EVALUATION
Department of Anesthesiology  Postprocedure Note    Patient: Andrew Borjas  MRN: [id number]  YOB: 1950  Date of evaluation: 9/12/2022      Procedure Summary     Date: 09/12/22 Room / Location: [address] [place] [address]    Anesthesia Start: 7418 Anesthesia Stop: 4147    Procedures:       [address] (Right)      EYE CATARACT EMULSIFICATION IOL IMPLANT-I STENT (Right) Diagnosis:       Nuclear sclerotic cataract of both eyes      (AGE RELATED NUCLEAR CAT  3+NS)    Surgeons: Daryl Gutierrez DO Responsible Provider: ALFREDO Hamilton CRNA    Anesthesia Type: MAC ASA Status: 4          Anesthesia Type: No value filed.     Cesar Phase I:      Cesar Phase II: Cesar Score: 10      Anesthesia Post Evaluation    Patient location during evaluation: PACU  Patient participation: complete - patient participated  Level of consciousness: awake and alert  Airway patency: patent  Nausea & Vomiting: no nausea and no vomiting  Complications: no  Cardiovascular status: blood pressure returned to baseline and hemodynamically stable  Respiratory status: acceptable and room air  Hydration status: euvolemic

## 2022-10-10 DIAGNOSIS — E11.9 TYPE 2 DIABETES MELLITUS WITHOUT COMPLICATION, WITHOUT LONG-TERM CURRENT USE OF INSULIN (HCC): ICD-10-CM

## 2022-10-10 RX ORDER — POTASSIUM CHLORIDE 20 MEQ/1
TABLET, EXTENDED RELEASE ORAL
Qty: 90 TABLET | Refills: 1 | Status: SHIPPED | OUTPATIENT
Start: 2022-10-10

## 2022-10-10 RX ORDER — GLIPIZIDE 5 MG/1
5 TABLET ORAL DAILY
Qty: 90 TABLET | Refills: 0 | Status: SHIPPED | OUTPATIENT
Start: 2022-10-10

## 2022-10-10 RX ORDER — BENAZEPRIL HYDROCHLORIDE 5 MG/1
5 TABLET, FILM COATED ORAL DAILY
Qty: 90 TABLET | Refills: 3 | Status: SHIPPED | OUTPATIENT
Start: 2022-10-10

## 2022-11-28 ENCOUNTER — OFFICE VISIT (OUTPATIENT)
Dept: PRIMARY CARE CLINIC | Age: 72
End: 2022-11-28
Payer: MEDICARE

## 2022-11-28 VITALS
WEIGHT: 263.2 LBS | RESPIRATION RATE: 18 BRPM | HEART RATE: 84 BPM | BODY MASS INDEX: 40.02 KG/M2 | SYSTOLIC BLOOD PRESSURE: 105 MMHG | DIASTOLIC BLOOD PRESSURE: 62 MMHG

## 2022-11-28 DIAGNOSIS — J44.9 CHRONIC OBSTRUCTIVE PULMONARY DISEASE, UNSPECIFIED COPD TYPE (HCC): ICD-10-CM

## 2022-11-28 DIAGNOSIS — I50.32 CHRONIC DIASTOLIC HEART FAILURE (HCC): ICD-10-CM

## 2022-11-28 DIAGNOSIS — E11.9 TYPE 2 DIABETES MELLITUS WITHOUT COMPLICATION, WITHOUT LONG-TERM CURRENT USE OF INSULIN (HCC): Primary | ICD-10-CM

## 2022-11-28 DIAGNOSIS — E66.01 SEVERE OBESITY (BMI 35.0-35.9 WITH COMORBIDITY) (HCC): ICD-10-CM

## 2022-11-28 LAB — HBA1C MFR BLD: 7.1 %

## 2022-11-28 PROCEDURE — 3074F SYST BP LT 130 MM HG: CPT | Performed by: FAMILY MEDICINE

## 2022-11-28 PROCEDURE — 3078F DIAST BP <80 MM HG: CPT | Performed by: FAMILY MEDICINE

## 2022-11-28 PROCEDURE — PBSHW POCT GLYCOSYLATED HEMOGLOBIN (HGB A1C): Performed by: FAMILY MEDICINE

## 2022-11-28 PROCEDURE — 3017F COLORECTAL CA SCREEN DOC REV: CPT | Performed by: FAMILY MEDICINE

## 2022-11-28 PROCEDURE — 99211 OFF/OP EST MAY X REQ PHY/QHP: CPT | Performed by: FAMILY MEDICINE

## 2022-11-28 PROCEDURE — 3051F HG A1C>EQUAL 7.0%<8.0%: CPT | Performed by: FAMILY MEDICINE

## 2022-11-28 PROCEDURE — G8427 DOCREV CUR MEDS BY ELIG CLIN: HCPCS | Performed by: FAMILY MEDICINE

## 2022-11-28 PROCEDURE — G8484 FLU IMMUNIZE NO ADMIN: HCPCS | Performed by: FAMILY MEDICINE

## 2022-11-28 PROCEDURE — 83036 HEMOGLOBIN GLYCOSYLATED A1C: CPT | Performed by: FAMILY MEDICINE

## 2022-11-28 PROCEDURE — 2022F DILAT RTA XM EVC RTNOPTHY: CPT | Performed by: FAMILY MEDICINE

## 2022-11-28 PROCEDURE — 1123F ACP DISCUSS/DSCN MKR DOCD: CPT | Performed by: FAMILY MEDICINE

## 2022-11-28 PROCEDURE — 3023F SPIROM DOC REV: CPT | Performed by: FAMILY MEDICINE

## 2022-11-28 PROCEDURE — G8417 CALC BMI ABV UP PARAM F/U: HCPCS | Performed by: FAMILY MEDICINE

## 2022-11-28 PROCEDURE — 99214 OFFICE O/P EST MOD 30 MIN: CPT | Performed by: FAMILY MEDICINE

## 2022-11-28 PROCEDURE — 1036F TOBACCO NON-USER: CPT | Performed by: FAMILY MEDICINE

## 2022-11-28 NOTE — PATIENT INSTRUCTIONS
SURVEY:    You may be receiving a survey from [employer] regarding your visit today. You may get this in the mail, through your MyChart, or in your email. Please complete the survey to enable us to provide the highest quality of care to you and your family. If you cannot score us a very good (5 Stars) on any question, please call the office to discuss how we could of made your experience exceptional.    Thank you!     Dr. Dean Smith, LPLAMIN Hawkins, MICHELLE Martinez, [address] [place] [address]    Phone: [phone]  Fax: [phone]    Office Hours:   Jhoana Gamez, [address] [facility], F: 8-5

## 2022-11-28 NOTE — PROGRESS NOTES
Janiya Garcia is a 67 y.o. male here for routine follow up of diabetes. He has not been checking  his blood glucose levels regularly. He has been somewhat compliant with diet and exercise by walking. He has been compliant with his medications. He is tolerating medications. Hypertension: Patient here for follow-up of elevated blood pressure. He is exercising by walking. Blood pressure is not well controlled at home. Patient does not check his blood pressure at home. Cardiac symptoms chest pain. He states that it happens occasionally and he takes baby Aspirin if it does happen. Cardiovascular risk factors: advanced age (older than 54 for men, 72 for women), diabetes mellitus, hypertension, male gender, and obesity (BMI >= 30 kg/m2). Use of agents associated with hypertension: none. Congestive Heart Failure  Patient presents for re-evaluation of congestive heart failure. Patient states that he occasionally gets chest pain, and if he does he takes baby aspirin. He states he is compliant most of the time with his diet. Allergies:  Sulfa antibiotics and Pcn [penicillins]    Past Medical History:    Past Medical History:   Diagnosis Date    Aortic aneurysm, thoracic 7/18/2016    Ataxia     Gait ataxia x 1 yr ago (approx. 2010)    CHF (congestive heart failure) ([facility])     COPD [facility]chronic obstructive pulmonary disease) ([facility] 4/4/2016    H/O cardiovascular stress test 08/28/2018    Equivocal myocardial perfusion study. There is a small/ moderate perfusion defect of mild intestiy in the inferoseptal and septal regions during stress imaging which is most consistent with ischemia but may be due to artifact. Overall, these results are most consistent with a low risk for CAD. H/O echocardiogram 06/30/2016    EF >60%. Mild LV hypertrophy with a normal LV cavity size. Aortic leaflet calcification with moderate aortic stenosis with a mean greadient of 20 mmHg.  Mild pulmonary hypertension estimated right ventricular systolic pressure of 37 mmHg. Mild tricuspid regurgitation. Moderate diastolic dysfunction. H/O echocardiogram 08/28/2018    EF >55%. The LV wall thickness is mildly increased. The pt has a sigmoid interventricular septum without evidence of outflow tract obstruction. Mild to modereate mitral regurg. Mild to tricuspid regurg. Evidence of mild (grade I) diastolic dysfunction is seen. The aortic root is considered to be at the upper limits of normal in size when corrected for body surface area. Heart failure ([address] 2003    History of echocardiogram 11/20/2014    Global left ventricular systolic function appears to be hyperdynamic with EF of  > 70% Left atrium is mildly dilated (29-33) with left atrial volume index of 31 ml/m2. Moderate regurgitation, mild tricuspid regurgitation. Evidence of moderate (gradeI II )  diastolic dysfunction is seen. History of stress test 12/30/15    Normal. EF 63%. Low risk for significant CAD. Hx of cardiac cath 07/21/2016    LMCA: Mild irregularites 10-20%. LAD: Mild irregularities 40-50%. LCx: Mild irregularities 30-40%. RCA: Mild irregularities 30-40%.     Hypertension     Obesity due to excess calories 4/4/2016    DAINA (obstructive sleep apnea) 4/4/2016    Polyneuropathy     Dec 2010    Smoking 4/4/2016    TIA (transient ischemic attack) 2003 & 2004    Type 2 diabetes mellitus without complication [facility])        Past Surgical History:    Past Surgical History:   Procedure Laterality Date    COLONOSCOPY      many many years ago     EYE SURGERY Right 9/12/2022    EYE TRABECULAR MICRO BYPASS performed by Wilner Bills DO at [facility] Left 7/25/2022    EYE CATARACT EMULSIFICATION IOL IMPLANT performed by Wilner Bills DO at [facility] Right 9/12/2022    EYE CATARACT EMULSIFICATION IOL IMPLANT-I STENT performed by Wilner Bills DO at [facility]      yrs ago    UPPER GASTROINTESTINAL ENDOSCOPY N/A 5/16/2018    EGD BIOPSY performed by Wily Fernando MD at [place] [address]  5/16/2018    EGD DILATION SAVORY performed by Wily Fernando MD at [place] [address]  5/16/2018    EGD POLYP SNARE performed by Wily Fernando MD at [place] [address]  05/16/2018    -bx,dilation,gastric polyp       Social History:   Social History     Tobacco Use    Smoking status: Former     Packs/day: 0.00     Years: 50.00     Pack years: 0.00     Types: Cigarettes     Quit date: 01/2019     Years since quitting: 3.9    Smokeless tobacco: Never    Tobacco comments:     on patches   Substance Use Topics    Alcohol use: No       Family History:   Family History   Problem Relation Age of Onset    Heart Disease Mother     Ovarian Cancer Mother     Heart Disease Father     Stroke Father     Other Sister         rheumatic fever         Review of Systems:  Constitutional: negative for fever or chills  Eyes: negative for visual disturbance   ENT: negative for sore throat or nasal congestion  Respiratory: negative for cough, shortness of breath and sputum  Cardiovascular: negative for chest pain,pnd,claudication or palpitations,has bilat leg edema and has not been taking his lasix daily  Gastrointestinal: negative for abd pain, derrick,nausea, vomiting, diarrhea or constipation  Genitourinary: negative for dysuria,,hematuria urgency or frequency  Musculoskeletal:negative for arthralgias, muscle weakness and stiff joints   Integument/breast: negative for skin rash or lesions  Neurological: negative for   numbness and tingling. Psych: negative for anxiety, depression, suicidial ideation and suicidal attempt.            Objective:  Physical Exam:  Resp 18   Wt 263 lb 3.2 oz (119.4 kg)   BMI 40.02 kg/m²   GEN:   He is alert and oriented  ENT:    ENT exam normal, no neck nodes or sinus tenderness  NECK:   neck supple and non tender without mass, no thyromegaly or thyroid nodules, no cervical lymphadenopathy  EYES:   No gross abnormalities. and PERRL  CVS:     CVS exam S1, S2 normal, no gallop, 2/6  murmur, chest clear, no JVD, no HSM, 2 + edema  PULM:   chest clear, no wheezing, rales, normal symmetric air entry  ABD:   Abdomen soft, non-tender. BS normal. No masses,  No organomegaly  MUSC: no joint tenderness, deformity or swelling  Skin : no  rash or lesions  EXT: {EXTREMITIES EXAM:20816::\"Extremities: + 2 pedal pulses, no edema or calf tenderness, and warm to touch  NEURO:  DTR -diminished bilat        Diagnostic Data:  Lab Results   Component Value Date    GLUCOSE 159 (H) 11/15/2021    LABA1C 7.1 11/28/2022    BUN 13 12/02/2021     11/15/2021    K 4.2 11/15/2021    CALCIUM 9.1 11/15/2021     11/15/2021    CO2 26 11/15/2021       Assessment:  1. Type 2 diabetes mellitus without complication, without long-term current use of insulin ([address]    2. Chronic diastolic heart failure ([address]    3. Severe obesity (BMI 35.0-35.9 with comorbidity) ([address]    4. Chronic obstructive pulmonary disease, unspecified COPD type ([facility] [address]      Plan   Diagnosis Orders   1. Type 2 diabetes mellitus without complication, without long-term current use of insulin (HCC)  POCT glycosylated hemoglobin (Hb A1C)      2. Chronic diastolic heart failure ([address]        3. Severe obesity (BMI 35.0-35.9 with comorbidity) ([address]        4. Chronic obstructive pulmonary disease, unspecified COPD type (HCC)            Check BS 1 times per day  Medication change: advised to take his lasix as prescribed-40 mg daily,monitor his weight daily and call if no improvement of his edema  Encouraged low fat and low sodium, [address]  diet.   Goal for blood pressure control is 130/80  Recommended regular exercise as tolerated, 5 times per week  Labs reviewed -hba1c 7.1  Flu vaccine at the pharmacy  ascivd risk  Orders Placed This Encounter   Procedures    POCT glycosylated hemoglobin (Hb A1C)       Current Outpatient Medications   Medication Sig Dispense Refill    glipiZIDE (GLUCOTROL) 5 MG tablet Take 1 tablet by mouth daily 90 tablet 0    potassium chloride (KLOR-CON M) 20 MEQ extended release tablet TAKE 1 TABLET BY MOUTH THREE TIMES DAILY 90 tablet 1    benazepril (LOTENSIN) 5 MG tablet Take 1 tablet by mouth daily 90 tablet 3    busPIRone (BUSPAR) 15 MG tablet Take 15 mg by mouth daily 90 tablet 0    furosemide (LASIX) 20 MG tablet Take 2 tablets by mouth in the morning. 180 tablet 0    atorvastatin (LIPITOR) 40 MG tablet Take 1 tablet by mouth in the morning. 90 tablet 0    gabapentin (NEURONTIN) 300 MG capsule TAKE 1 CAPSULE BY MOUTH THREE TIMES DAILY      blood glucose monitor strips Test 1 times a day 100 strip 2    Blood Glucose Monitoring Suppl w/Device KIT 1 kit by Does not apply route Daily Please check blood glucose levels daily. 1 kit 0    Lancets Misc. MISC 1 applicator by Does not apply route daily 100 each 2    nystatin (MYCOSTATIN) 653427 UNIT/GM powder Apply 3 times daily. 60 g 2    glucose monitoring (FREESTYLE FREEDOM) kit 1 kit by Does not apply route daily 1 kit 0    potassium chloride (KLOR-CON M) 20 MEQ TBCR extended release tablet Take 1 tablet by mouth 3 times daily 270 tablet 0    pantoprazole (PROTONIX) 40 MG tablet Take 1 tablet by mouth 2 times daily 180 tablet 3    NONFORMULARY Tumeric      acetaminophen (TYLENOL) 500 MG tablet Take 1,000 mg by mouth daily as needed       ascorbic acid (VITAMIN C) 500 MG tablet Take 500 mg by mouth daily 3 in am and 2 at hs      Glucosamine-Chondroitin (GLUCOSAMINE CHONDR COMPLEX PO) Take by mouth      Cinnamon 500 MG CAPS Take 2,500 mg by mouth daily. aspirin 81 MG EC tablet Take 81 mg by mouth daily. Cyanocobalamin (B-12 PO) Take 1,000 mg by mouth. Pt states to be taking 2500 mg daily at this time. Omega-3 Fatty Acids (FISH OIL) 1000 MG CAPS Take 3,000 mg by mouth daily.       loratadine (CLARITIN) 10 MG tablet Take 5 mg by mouth daily        No current facility-administered medications for this visit. No follow-ups on file.       Electronically signed by Virgilio Marin MD on 11/28/2022 at 3:00 PM

## 2022-12-01 RX ORDER — ATORVASTATIN CALCIUM 40 MG/1
40 TABLET, FILM COATED ORAL DAILY
Qty: 90 TABLET | Refills: 0 | Status: SHIPPED | OUTPATIENT
Start: 2022-12-01

## 2022-12-08 RX ORDER — BUSPIRONE HYDROCHLORIDE 15 MG/1
15 TABLET ORAL DAILY
Qty: 90 TABLET | Refills: 0 | Status: SHIPPED | OUTPATIENT
Start: 2022-12-08

## 2022-12-08 RX ORDER — POTASSIUM CHLORIDE 1500 MG/1
20 TABLET, FILM COATED, EXTENDED RELEASE ORAL 3 TIMES DAILY
Qty: 270 TABLET | Refills: 0 | Status: SHIPPED | OUTPATIENT
Start: 2022-12-08

## 2022-12-12 ENCOUNTER — OFFICE VISIT (OUTPATIENT)
Dept: PRIMARY CARE CLINIC | Age: 72
End: 2022-12-12
Payer: MEDICARE

## 2022-12-12 VITALS
OXYGEN SATURATION: 95 % | BODY MASS INDEX: 39.86 KG/M2 | RESPIRATION RATE: 18 BRPM | SYSTOLIC BLOOD PRESSURE: 98 MMHG | DIASTOLIC BLOOD PRESSURE: 72 MMHG | TEMPERATURE: 100.5 F | HEART RATE: 117 BPM | WEIGHT: 263 LBS | HEIGHT: 68 IN

## 2022-12-12 DIAGNOSIS — I50.32 CHRONIC DIASTOLIC HEART FAILURE (HCC): ICD-10-CM

## 2022-12-12 DIAGNOSIS — J20.9 ACUTE BRONCHITIS, UNSPECIFIED ORGANISM: Primary | ICD-10-CM

## 2022-12-12 PROCEDURE — 99211 OFF/OP EST MAY X REQ PHY/QHP: CPT

## 2022-12-12 PROCEDURE — G8484 FLU IMMUNIZE NO ADMIN: HCPCS | Performed by: FAMILY MEDICINE

## 2022-12-12 PROCEDURE — 99213 OFFICE O/P EST LOW 20 MIN: CPT | Performed by: FAMILY MEDICINE

## 2022-12-12 PROCEDURE — 3074F SYST BP LT 130 MM HG: CPT | Performed by: FAMILY MEDICINE

## 2022-12-12 PROCEDURE — G8417 CALC BMI ABV UP PARAM F/U: HCPCS | Performed by: FAMILY MEDICINE

## 2022-12-12 PROCEDURE — 1123F ACP DISCUSS/DSCN MKR DOCD: CPT | Performed by: FAMILY MEDICINE

## 2022-12-12 PROCEDURE — 3017F COLORECTAL CA SCREEN DOC REV: CPT | Performed by: FAMILY MEDICINE

## 2022-12-12 PROCEDURE — 1036F TOBACCO NON-USER: CPT | Performed by: FAMILY MEDICINE

## 2022-12-12 PROCEDURE — 3078F DIAST BP <80 MM HG: CPT | Performed by: FAMILY MEDICINE

## 2022-12-12 PROCEDURE — G8427 DOCREV CUR MEDS BY ELIG CLIN: HCPCS | Performed by: FAMILY MEDICINE

## 2022-12-12 RX ORDER — LEVOFLOXACIN 500 MG/1
500 TABLET, FILM COATED ORAL DAILY
Qty: 10 TABLET | Refills: 0 | Status: SHIPPED | OUTPATIENT
Start: 2022-12-12 | End: 2022-12-22

## 2022-12-12 RX ORDER — FUROSEMIDE 20 MG/1
60 TABLET ORAL DAILY
Qty: 180 TABLET | Refills: 0 | Status: SHIPPED
Start: 2022-12-12

## 2022-12-12 NOTE — PROGRESS NOTES
Cough:  Heidi Mills is a 67 y.o. male that complains of a cough. Onset of symptoms was 7 days ago. He also has congestion, fever and cough with thick yellow sputum. Denies any fevers, chills, chest pain, shortness or breath, hemoptysis, wheezing. Treatment to date: none. CURRENT ALLERGIES: Sulfa antibiotics and Pcn [penicillins]    SOCIAL HISTORY:   Social History     Tobacco Use    Smoking status: Former     Packs/day: 0.00     Years: 50.00     Pack years: 0.00     Types: Cigarettes     Quit date: 01/2019     Years since quitting: 3.9    Smokeless tobacco: Never    Tobacco comments:     on patches   Vaping Use    Vaping Use: Never used   Substance Use Topics    Alcohol use: No    Drug use: No       He has a current medication list which includes the following prescription(s): furosemide, levofloxacin, buspirone, potassium chloride, atorvastatin, glipizide, potassium chloride, benazepril, gabapentin, blood glucose test strips, blood glucose monitoring suppl, lancets misc., nystatin, glucose monitoring, pantoprazole, NONFORMULARY, acetaminophen, ascorbic acid, glucosamine-chondroitin, cinnamon, aspirin, cyanocobalamin, fish oil, and loratadine. Review of Systems:  Constitutional: neg for fever, chills, neg for headache  Eyes: negative for visual disturbance   ENT: positive  for sore throat , positive for nasal congestion, neg for ear pain  Respiratory: positive for cough, positive for shortness of breath positive for sputum   Cardiovascular: negative for chest pain,pnd or palpitations  Gastrointestinal: negative for abd pain, nausea, vomiting, diarrhea or constipation  Integument/breast: negative for skin rash or lesions  Neurological: negative for unilateral weakness, numbness or tingling.   No joint pain,bodyache     Objective:  BP 98/72 (Site: Right Upper Arm, Position: Sitting, Cuff Size: Large Adult)   Pulse (!) 117   Temp (!) 100.5 °F (38.1 °C)   Resp 18   Ht 5' 8\" (1.727 m)   Wt 263 lb (119.3 kg)   SpO2 95%   BMI 39.99 kg/m²    GEN:  He is alert and oriented. no distress  EAR:   RIGHT ear: Canal: normal and Tympanic membrane: normal landmarks and mobility    LEFT ear: Canal: normal and Tympanic membrane: normal landmarks and mobility  NOSE:  boggy mucosa with purulent mucus drainage  PHARYNX:  erythematous without enlarged tonsils or exudate  NECK:  normal, supple, no lymphadenopathy  CVS:   Regular rate and rhythm, no murmur  PULM:   Has bilat rhonchi,no wheezing  ABD:   BS's positive, abd is soft and nonfocal.   EXT:    1+ edema bilat    Assessment:  1. Acute bronchitis, unspecified organism    2.  Chronic diastolic heart failure (HCC)      Plan:  Increase lasix to 60 mg daily  Medications: Levofloxacin 500 mg po QD x 7 days  Medications-Tessalon pearls 100 mg tid prn  Medications-Rescue inhalor q 4 hr prn   May use OTC meds:    Tylenol 500 mg po q6 hrs PRN fever   Follow up PRN if symptoms do not resolve    Electronically signed by Virgilio Marin MD on 12/12/2022 at 6:39 PM

## 2022-12-12 NOTE — PATIENT INSTRUCTIONS
SURVEY:    You may be receiving a survey from [employer] regarding your visit today. You may get this in the mail, through your MyChart, or in your email. Please complete the survey to enable us to provide the highest quality of care to you and your family. If you cannot score us a very good (5 Stars) on any question, please call the office to discuss how we could of made your experience exceptional.    Thank you!     Dr. Rebeka Stoner, LIBAN Waltno, MICHELLE Escalera, [address] [place] [address]    Phone: [phone]  Fax: [phone]    Office Hours:   Bruce Garcia, [address] [facility], F: 8-5

## 2022-12-16 RX ORDER — ATORVASTATIN CALCIUM 40 MG/1
40 TABLET, FILM COATED ORAL DAILY
Qty: 90 TABLET | Refills: 0 | Status: SHIPPED | OUTPATIENT
Start: 2022-12-16

## 2022-12-16 NOTE — TELEPHONE ENCOUNTER
Patients wife called in stating that steves legs are looking better than they have in a while. He is currently taking three lasix a day.

## 2022-12-19 ENCOUNTER — HOSPITAL ENCOUNTER (OUTPATIENT)
Age: 72
Discharge: HOME | End: 2022-12-19
Payer: MEDICARE

## 2022-12-19 DIAGNOSIS — I50.32 CHRONIC DIASTOLIC HEART FAILURE (HCC): ICD-10-CM

## 2022-12-19 LAB
ANION GAP SERPL CALCULATED.3IONS-SCNC: 11 MMOL/L (ref 9–17)
BUN BLDV-MCNC: 14 MG/DL (ref 8–23)
BUN/CREAT BLD: 16 (ref 9–20)
CALCIUM SERPL-MCNC: 9.4 MG/DL (ref 8.6–10.4)
CHLORIDE BLD-SCNC: 107 MMOL/L (ref 98–107)
CO2: 24 MMOL/L (ref 20–31)
CREAT SERPL-MCNC: 0.89 MG/DL (ref 0.7–1.2)
GFR SERPL CREATININE-BSD FRML MDRD: >60 ML/MIN/1.73M2
GLUCOSE BLD-MCNC: 201 MG/DL (ref 70–99)
POTASSIUM SERPL-SCNC: 4.2 MMOL/L (ref 3.7–5.3)
SODIUM BLD-SCNC: 142 MMOL/L (ref 135–144)

## 2022-12-19 PROCEDURE — 80048 BASIC METABOLIC PNL TOTAL CA: CPT

## 2022-12-19 PROCEDURE — 36415 COLL VENOUS BLD VENIPUNCTURE: CPT

## 2023-01-05 PROBLEM — M48.062 SPINAL STENOSIS OF LUMBAR REGION WITH NEUROGENIC CLAUDICATION: Status: ACTIVE | Noted: 2022-05-17

## 2023-01-09 ENCOUNTER — HOSPITAL ENCOUNTER (OUTPATIENT)
Age: 73
Discharge: HOME | End: 2023-01-09
Payer: MEDICARE

## 2023-01-09 ENCOUNTER — OFFICE VISIT (OUTPATIENT)
Dept: PRIMARY CARE CLINIC | Age: 73
End: 2023-01-09
Payer: MEDICARE

## 2023-01-09 VITALS
DIASTOLIC BLOOD PRESSURE: 62 MMHG | RESPIRATION RATE: 18 BRPM | HEART RATE: 93 BPM | WEIGHT: 245.2 LBS | HEIGHT: 68 IN | BODY MASS INDEX: 37.16 KG/M2 | SYSTOLIC BLOOD PRESSURE: 98 MMHG | OXYGEN SATURATION: 98 %

## 2023-01-09 DIAGNOSIS — I50.32 CHRONIC DIASTOLIC HEART FAILURE (HCC): Primary | ICD-10-CM

## 2023-01-09 DIAGNOSIS — E66.01 SEVERE OBESITY (BMI 35.0-39.9) WITH COMORBIDITY (HCC): ICD-10-CM

## 2023-01-09 DIAGNOSIS — I50.32 CHRONIC DIASTOLIC HEART FAILURE (HCC): ICD-10-CM

## 2023-01-09 LAB
ANION GAP SERPL CALCULATED.3IONS-SCNC: 12 MMOL/L (ref 9–17)
BUN BLDV-MCNC: 14 MG/DL (ref 8–23)
BUN/CREAT BLD: 20 (ref 9–20)
CALCIUM SERPL-MCNC: 10 MG/DL (ref 8.6–10.4)
CHLORIDE BLD-SCNC: 107 MMOL/L (ref 98–107)
CO2: 25 MMOL/L (ref 20–31)
CREAT SERPL-MCNC: 0.7 MG/DL (ref 0.7–1.2)
GFR SERPL CREATININE-BSD FRML MDRD: >60 ML/MIN/1.73M2
GLUCOSE BLD-MCNC: 146 MG/DL (ref 70–99)
MAGNESIUM: 1.8 MG/DL (ref 1.6–2.6)
POTASSIUM SERPL-SCNC: 4.3 MMOL/L (ref 3.7–5.3)
SODIUM BLD-SCNC: 144 MMOL/L (ref 135–144)

## 2023-01-09 PROCEDURE — 1123F ACP DISCUSS/DSCN MKR DOCD: CPT | Performed by: FAMILY MEDICINE

## 2023-01-09 PROCEDURE — G8417 CALC BMI ABV UP PARAM F/U: HCPCS | Performed by: FAMILY MEDICINE

## 2023-01-09 PROCEDURE — 83735 ASSAY OF MAGNESIUM: CPT

## 2023-01-09 PROCEDURE — 3017F COLORECTAL CA SCREEN DOC REV: CPT | Performed by: FAMILY MEDICINE

## 2023-01-09 PROCEDURE — 80048 BASIC METABOLIC PNL TOTAL CA: CPT

## 2023-01-09 PROCEDURE — 3078F DIAST BP <80 MM HG: CPT | Performed by: FAMILY MEDICINE

## 2023-01-09 PROCEDURE — G8484 FLU IMMUNIZE NO ADMIN: HCPCS | Performed by: FAMILY MEDICINE

## 2023-01-09 PROCEDURE — 3074F SYST BP LT 130 MM HG: CPT | Performed by: FAMILY MEDICINE

## 2023-01-09 PROCEDURE — G8427 DOCREV CUR MEDS BY ELIG CLIN: HCPCS | Performed by: FAMILY MEDICINE

## 2023-01-09 PROCEDURE — 99213 OFFICE O/P EST LOW 20 MIN: CPT | Performed by: FAMILY MEDICINE

## 2023-01-09 PROCEDURE — 1036F TOBACCO NON-USER: CPT | Performed by: FAMILY MEDICINE

## 2023-01-09 PROCEDURE — 36415 COLL VENOUS BLD VENIPUNCTURE: CPT

## 2023-01-09 ASSESSMENT — PATIENT HEALTH QUESTIONNAIRE - PHQ9
SUM OF ALL RESPONSES TO PHQ QUESTIONS 1-9: 0
7. TROUBLE CONCENTRATING ON THINGS, SUCH AS READING THE NEWSPAPER OR WATCHING TELEVISION: 0
3. TROUBLE FALLING OR STAYING ASLEEP: 0
SUM OF ALL RESPONSES TO PHQ QUESTIONS 1-9: 0
SUM OF ALL RESPONSES TO PHQ QUESTIONS 1-9: 0
6. FEELING BAD ABOUT YOURSELF - OR THAT YOU ARE A FAILURE OR HAVE LET YOURSELF OR YOUR FAMILY DOWN: 0
5. POOR APPETITE OR OVEREATING: 0
1. LITTLE INTEREST OR PLEASURE IN DOING THINGS: 0
10. IF YOU CHECKED OFF ANY PROBLEMS, HOW DIFFICULT HAVE THESE PROBLEMS MADE IT FOR YOU TO DO YOUR WORK, TAKE CARE OF THINGS AT HOME, OR GET ALONG WITH OTHER PEOPLE: 0
8. MOVING OR SPEAKING SO SLOWLY THAT OTHER PEOPLE COULD HAVE NOTICED. OR THE OPPOSITE, BEING SO FIGETY OR RESTLESS THAT YOU HAVE BEEN MOVING AROUND A LOT MORE THAN USUAL: 0
SUM OF ALL RESPONSES TO PHQ9 QUESTIONS 1 & 2: 0
SUM OF ALL RESPONSES TO PHQ QUESTIONS 1-9: 0
2. FEELING DOWN, DEPRESSED OR HOPELESS: 0
4. FEELING TIRED OR HAVING LITTLE ENERGY: 0
9. THOUGHTS THAT YOU WOULD BE BETTER OFF DEAD, OR OF HURTING YOURSELF: 0

## 2023-01-09 NOTE — PATIENT INSTRUCTIONS
SURVEY:    You may be receiving a survey from [employer] regarding your visit today. You may get this in the mail, through your MyChart, or in your email. Please complete the survey to enable us to provide the highest quality of care to you and your family. If you cannot score us a very good (5 Stars) on any question, please call the office to discuss how we could of made your experience exceptional.    Thank you!     Dr. Anitra Burrell, MICHELLE Garcia, [address] [place] [address]    Phone: [phone]  Fax: [phone]    Office Hours:   Robert Valdez, [address] [facility] [address] [address], F: 8-5

## 2023-01-09 NOTE — PROGRESS NOTES
Leg Swelling (Patient is here for appointment for leg swelling. Patients wife reports that 4 days ago his left leg was swollen, red and hot to touch. )   Chf- has been taking his lasix and has lost 18 lbs. Has some weakness and tremors  Had fall at home        CURRENT ALLERGIES: Sulfa antibiotics and Pcn [penicillins]    PAST MEDICAL HISTORY:   Past Medical History:   Diagnosis Date    Aortic aneurysm, thoracic 7/18/2016    Ataxia     Gait ataxia x 1 yr ago (approx. 2010)    CHF (congestive heart failure) ([facility])     COPD (chronic obstructive pulmonary disease) ([address] 4/4/2016    H/O cardiovascular stress test 08/28/2018    Equivocal myocardial perfusion study. There is a small/ moderate perfusion defect of mild intestiy in the inferoseptal and septal regions during stress imaging which is most consistent with ischemia but may be due to artifact. Overall, these results are most consistent with a low risk for CAD. H/O echocardiogram 06/30/2016    EF >60%. Mild LV hypertrophy with a normal LV cavity size. Aortic leaflet calcification with moderate aortic stenosis with a mean greadient of 20 mmHg. Mild pulmonary hypertension estimated right ventricular systolic pressure of 37 mmHg. Mild tricuspid regurgitation. Moderate diastolic dysfunction. H/O echocardiogram 08/28/2018    EF >55%. The LV wall thickness is mildly increased. The pt has a sigmoid interventricular septum without evidence of outflow tract obstruction. Mild to modereate mitral regurg. Mild to tricuspid regurg. Evidence of mild (grade I) diastolic dysfunction is seen. The aortic root is considered to be at the upper limits of normal in size when corrected for body surface area. Heart failure ([address] 2003    History of echocardiogram 11/20/2014    Global left ventricular systolic function appears to be hyperdynamic with EF of  > 70% Left atrium is mildly dilated (29-33) with left atrial volume index of 31 ml/m2.  Moderate regurgitation, mild tricuspid regurgitation. Evidence of moderate (gradeI II )  diastolic dysfunction is seen. History of stress test 12/30/15    Normal. EF 63%. Low risk for significant CAD. Hx of cardiac cath 07/21/2016    LMCA: Mild irregularites 10-20%. LAD: Mild irregularities 40-50%. LCx: Mild irregularities 30-40%. RCA: Mild irregularities 30-40%.     Hypertension     Obesity due to excess calories 4/4/2016    DAINA (obstructive sleep apnea) 4/4/2016    Polyneuropathy     Dec 2010    Smoking 4/4/2016    TIA (transient ischemic attack) 2003 & 2004    Type 2 diabetes mellitus without complication ([facility]        SURGICAL HISTORY:   Past Surgical History:   Procedure Laterality Date    COLONOSCOPY      many many years ago     EYE SURGERY Right 9/12/2022    EYE TRABECULAR MICRO BYPASS performed by Chance Mcbride DO at [facility] Left 7/25/2022    EYE CATARACT EMULSIFICATION IOL IMPLANT performed by Chance Mcbride DO at [facility] Right 9/12/2022    EYE CATARACT EMULSIFICATION IOL IMPLANT-I STENT performed by Chance Mcbride DO at [address] [place] [address]      yrs ago    [address] [place] [address] 5/16/2018    EGD BIOPSY performed by Kierra Orozco MD at [address] [place] [address]  5/16/2018    EGD DILATION SAVORY performed by Kierra Orozco MD at [address] [place] [address]  5/16/2018    EGD POLYP SNARE performed by Kierra Orozco MD at [address] [place] [address]  05/16/2018    -javier,dilation,gastric polyp       FAMILY HISTORY:   Family History   Problem Relation Age of Onset    Heart Disease Mother     Ovarian Cancer Mother     Heart Disease Father     Stroke Father     Other Sister         rheumatic fever       SOCIAL HISTORY:   Social History     Tobacco Use    Smoking status: Former     Packs/day: 0.00     Years: 50.00     Pack years: 0.00     Types: Cigarettes     Quit date: 01/2019     Years since quittin.0    Smokeless tobacco: Never    Tobacco comments:     on patches   Vaping Use    Vaping Use: Never used   Substance Use Topics    Alcohol use: No    Drug use: No     Prior to Admission medications    Medication Sig Start Date End Date Taking? Authorizing Provider   atorvastatin (LIPITOR) 40 MG tablet Take 1 tablet by mouth daily 22  Yes Jorge Shields MD   furosemide (LASIX) 20 MG tablet Take 3 tablets by mouth daily 22  Yes Jorge Shields MD   busPIRone (BUSPAR) 15 MG tablet Take 15 mg by mouth daily 22  Yes Jorge Shields MD   potassium chloride (KLOR-CON M) 20 MEQ TBCR extended release tablet Take 1 tablet by mouth 3 times daily 22  Yes Jorge Shields MD   glipiZIDE (GLUCOTROL) 5 MG tablet Take 1 tablet by mouth daily 10/10/22  Yes Jorge Shields MD   potassium chloride (KLOR-CON M) 20 MEQ extended release tablet TAKE 1 TABLET BY MOUTH THREE TIMES DAILY 10/10/22  Yes Jorge Shields MD   benazepril (LOTENSIN) 5 MG tablet Take 1 tablet by mouth daily 10/10/22  Yes Jorge Shields MD   gabapentin (NEURONTIN) 300 MG capsule TAKE 1 CAPSULE BY MOUTH THREE TIMES DAILY 22  Yes Historical Provider, MD   blood glucose monitor strips Test 1 times a day 22  Yes Jorge Shields MD   Blood Glucose Monitoring Suppl w/Device KIT 1 kit by Does not apply route Daily Please check blood glucose levels daily. 22  Yes Jorge Shields MD   Lancets Misc. MISC 1 applicator by Does not apply route daily 22  Yes Jorge Shields MD   nystatin (MYCOSTATIN) 971405 UNIT/GM powder Apply 3 times daily.  22  Yes Jorge Shields MD   glucose monitoring (FREESTYLE FREEDOM) kit 1 kit by Does not apply route daily 22  Yes Jorge Shields MD   pantoprazole (PROTONIX) 40 MG tablet Take 1 tablet by mouth 2 times daily 22  Yes Jorge Shields MD   NONFORMULARY Tumeric   Yes Historical Provider, MD   acetaminophen (TYLENOL) 500 MG tablet Take 1,000 mg by mouth daily as needed    Yes Historical Provider, MD   ascorbic acid (VITAMIN C) 500 MG tablet Take 500 mg by mouth daily 3 in am and 2 at hs   Yes Historical Provider, MD   Glucosamine-Chondroitin (GLUCOSAMINE CHONDR COMPLEX PO) Take by mouth   Yes Historical Provider, MD   Cinnamon 500 MG CAPS Take 2,500 mg by mouth daily. Yes Historical Provider, MD   aspirin 81 MG EC tablet Take 81 mg by mouth daily. Yes Historical Provider, MD   Cyanocobalamin (B-12 PO) Take 1,000 mg by mouth. Pt states to be taking 2500 mg daily at this time. Yes Historical Provider, MD   Omega-3 Fatty Acids (FISH OIL) 1000 MG CAPS Take 3,000 mg by mouth daily. Yes Historical Provider, MD   loratadine (CLARITIN) 10 MG tablet Take 5 mg by mouth daily    Yes Historical Provider, MD       Review of Systems:  Constitutional: negative for fevers or chills  Eyes: negative for visual disturbance   ENT: negative for sore throat or nasal congestion  Respiratory: negative for shortness of breath or cough  Cardiovascular: negative for chest pain ,palpitations,pnd,syncope, leg edema improving  Gastrointestinal: negative for abd pain, nausea, vomiting, diarrhea , constipation,hemetemesis,derrick,blood in stool  Genitourinary: negative for dysuria, urgency ,frequency,hematuria  Integument/breast: negative for skin rash or lesions  Neurological: negative for unilateral weakness, numbness or tingling. Skeletal Muscular: no joint pain,jont swelling,    Subjective:  Vitals:    01/09/23 1525   BP: 98/62   Pulse: 93   Resp: 18   SpO2: 98%         Exam:  GEN:   A & O x3, no apparent distress  EYES: No gross abnormalities.   ENT:ENT exam normal, no neck nodes or sinus tenderness  NECK: normal, supple, no lymphadenopathy,  no carotid bruits  PULM: clear to auscultation bilaterally- no wheezes, rales or rhonchi, normal air movement, no respiratory distress  COR: regular rate & rhythm, no gallops, and 2/6 murmer  ABD:  soft, non-tender  : deferred  EXT: Extremities: + 2 pedal pulses, has bilat leg edema - 1 +,no calf tenderness, and warm to touch. Normal nails without lesions  NEURO: Motor and sensory grossly intact,hss fine tremors ,no cog wheel rigidity,writing is normal,no fastinating gait  SKIN:  No skin lesions or rashes      Assessment:  1. Chronic diastolic heart failure ([address]    2. Severe obesity (BMI 35.0-39. 9) with comorbidity ([address]      Labs reviewed: bmp    Plan:    ICD-10-CM    1. Chronic diastolic heart failure (HCC) -improving,continue diuresis I24.89 Basic Metabolic Panel     Magnesium      2. Severe obesity (BMI 35.0-39. 9) with comorbidity ([address]  E66.01           Orders Placed This Encounter   Procedures    Basic Metabolic Panel     Standing Status:   Future     Standing Expiration Date:   1/9/2024    Magnesium     Standing Status:   Future     Standing Expiration Date:   1/9/2024     No follow-ups on file. No orders of the defined types were placed in this encounter.   Discussed therapy for weakness- declines  Medication directions and side effects discussed      Electronically signed by Harpreet Miller MD on 1/9/2023 at 4:08 PM         Harpreet Miller MD, MD

## 2023-02-02 ENCOUNTER — TELEPHONE (OUTPATIENT)
Dept: PRIMARY CARE CLINIC | Age: 73
End: 2023-02-02

## 2023-02-02 RX ORDER — LEVOFLOXACIN 500 MG/1
500 TABLET, FILM COATED ORAL DAILY
Qty: 10 TABLET | Refills: 0 | Status: SHIPPED | OUTPATIENT
Start: 2023-02-02 | End: 2023-02-12

## 2023-02-02 NOTE — TELEPHONE ENCOUNTER
Spouse Von Portillo calling office to report patient's cold symptoms have returned and she is requesting antibiotic sent to pharmacy. Spouse reports productive cough, nasal congestion and green/yellow mucus. Denies fever, chills, or fatigue.

## 2023-02-16 DIAGNOSIS — E11.9 TYPE 2 DIABETES MELLITUS WITHOUT COMPLICATION, WITHOUT LONG-TERM CURRENT USE OF INSULIN (HCC): ICD-10-CM

## 2023-02-16 RX ORDER — BENAZEPRIL HYDROCHLORIDE 5 MG/1
5 TABLET, FILM COATED ORAL DAILY
Qty: 90 TABLET | Refills: 0 | Status: SHIPPED | OUTPATIENT
Start: 2023-02-16

## 2023-02-16 RX ORDER — GLIPIZIDE 5 MG/1
5 TABLET ORAL DAILY
Qty: 90 TABLET | Refills: 0 | Status: SHIPPED | OUTPATIENT
Start: 2023-02-16

## 2023-04-04 ENCOUNTER — OFFICE VISIT (OUTPATIENT)
Dept: PRIMARY CARE CLINIC | Age: 73
End: 2023-04-04
Payer: MEDICARE

## 2023-04-04 VITALS
SYSTOLIC BLOOD PRESSURE: 127 MMHG | HEART RATE: 76 BPM | OXYGEN SATURATION: 98 % | BODY MASS INDEX: 35.71 KG/M2 | DIASTOLIC BLOOD PRESSURE: 78 MMHG | HEIGHT: 68 IN | WEIGHT: 235.6 LBS | RESPIRATION RATE: 18 BRPM

## 2023-04-04 DIAGNOSIS — I50.32 CHRONIC DIASTOLIC HEART FAILURE (HCC): Primary | ICD-10-CM

## 2023-04-04 DIAGNOSIS — J44.9 CHRONIC OBSTRUCTIVE PULMONARY DISEASE, UNSPECIFIED COPD TYPE (HCC): ICD-10-CM

## 2023-04-04 DIAGNOSIS — K21.9 GASTROESOPHAGEAL REFLUX DISEASE WITHOUT ESOPHAGITIS: ICD-10-CM

## 2023-04-04 DIAGNOSIS — E11.40 TYPE 2 DIABETES MELLITUS WITH DIABETIC NEUROPATHY, WITHOUT LONG-TERM CURRENT USE OF INSULIN (HCC): ICD-10-CM

## 2023-04-04 PROCEDURE — 3046F HEMOGLOBIN A1C LEVEL >9.0%: CPT | Performed by: FAMILY MEDICINE

## 2023-04-04 PROCEDURE — 3078F DIAST BP <80 MM HG: CPT | Performed by: FAMILY MEDICINE

## 2023-04-04 PROCEDURE — 2022F DILAT RTA XM EVC RTNOPTHY: CPT | Performed by: FAMILY MEDICINE

## 2023-04-04 PROCEDURE — G8417 CALC BMI ABV UP PARAM F/U: HCPCS | Performed by: FAMILY MEDICINE

## 2023-04-04 PROCEDURE — 1123F ACP DISCUSS/DSCN MKR DOCD: CPT | Performed by: FAMILY MEDICINE

## 2023-04-04 PROCEDURE — 99214 OFFICE O/P EST MOD 30 MIN: CPT | Performed by: FAMILY MEDICINE

## 2023-04-04 PROCEDURE — 3023F SPIROM DOC REV: CPT | Performed by: FAMILY MEDICINE

## 2023-04-04 PROCEDURE — 99211 OFF/OP EST MAY X REQ PHY/QHP: CPT | Performed by: FAMILY MEDICINE

## 2023-04-04 PROCEDURE — 1036F TOBACCO NON-USER: CPT | Performed by: FAMILY MEDICINE

## 2023-04-04 PROCEDURE — G8427 DOCREV CUR MEDS BY ELIG CLIN: HCPCS | Performed by: FAMILY MEDICINE

## 2023-04-04 PROCEDURE — 3074F SYST BP LT 130 MM HG: CPT | Performed by: FAMILY MEDICINE

## 2023-04-04 PROCEDURE — 3017F COLORECTAL CA SCREEN DOC REV: CPT | Performed by: FAMILY MEDICINE

## 2023-04-04 RX ORDER — POTASSIUM CHLORIDE 1500 MG/1
20 TABLET, FILM COATED, EXTENDED RELEASE ORAL 3 TIMES DAILY
Qty: 270 TABLET | Refills: 0 | Status: SHIPPED | OUTPATIENT
Start: 2023-04-04

## 2023-04-04 RX ORDER — BENAZEPRIL HYDROCHLORIDE 5 MG/1
5 TABLET, FILM COATED ORAL DAILY
Qty: 90 TABLET | Refills: 0 | Status: SHIPPED | OUTPATIENT
Start: 2023-04-04

## 2023-04-04 RX ORDER — GLIPIZIDE 5 MG/1
5 TABLET ORAL DAILY
Qty: 90 TABLET | Refills: 0 | Status: CANCELLED | OUTPATIENT
Start: 2023-04-04

## 2023-04-04 RX ORDER — FUROSEMIDE 20 MG/1
60 TABLET ORAL DAILY
Qty: 180 TABLET | Refills: 0 | Status: SHIPPED | OUTPATIENT
Start: 2023-04-04

## 2023-04-04 RX ORDER — GLUCOSAMINE HCL/CHONDROITIN SU 500-400 MG
CAPSULE ORAL
Qty: 100 STRIP | Refills: 2 | Status: SHIPPED | OUTPATIENT
Start: 2023-04-04

## 2023-04-04 RX ORDER — LATANOPROST 50 UG/ML
SOLUTION/ DROPS OPHTHALMIC
COMMUNITY
Start: 2023-03-04

## 2023-04-04 RX ORDER — NYSTATIN 100000 [USP'U]/G
POWDER TOPICAL
Qty: 60 G | Refills: 2 | Status: SHIPPED | OUTPATIENT
Start: 2023-04-04

## 2023-04-04 RX ORDER — ATORVASTATIN CALCIUM 40 MG/1
40 TABLET, FILM COATED ORAL DAILY
Qty: 90 TABLET | Refills: 0 | Status: SHIPPED | OUTPATIENT
Start: 2023-04-04

## 2023-04-04 RX ORDER — PANTOPRAZOLE SODIUM 40 MG/1
40 TABLET, DELAYED RELEASE ORAL 2 TIMES DAILY
Qty: 180 TABLET | Refills: 3 | Status: SHIPPED | OUTPATIENT
Start: 2023-04-04

## 2023-04-04 RX ORDER — BUSPIRONE HYDROCHLORIDE 15 MG/1
15 TABLET ORAL DAILY
Qty: 90 TABLET | Refills: 0 | Status: SHIPPED | OUTPATIENT
Start: 2023-04-04

## 2023-04-04 SDOH — ECONOMIC STABILITY: FOOD INSECURITY: WITHIN THE PAST 12 MONTHS, YOU WORRIED THAT YOUR FOOD WOULD RUN OUT BEFORE YOU GOT MONEY TO BUY MORE.: NEVER TRUE

## 2023-04-04 SDOH — ECONOMIC STABILITY: FOOD INSECURITY: WITHIN THE PAST 12 MONTHS, THE FOOD YOU BOUGHT JUST DIDN'T LAST AND YOU DIDN'T HAVE MONEY TO GET MORE.: NEVER TRUE

## 2023-04-04 SDOH — ECONOMIC STABILITY: HOUSING INSECURITY
IN THE LAST 12 MONTHS, WAS THERE A TIME WHEN YOU DID NOT HAVE A STEADY PLACE TO SLEEP OR SLEPT IN A SHELTER (INCLUDING NOW)?: NO

## 2023-04-04 SDOH — ECONOMIC STABILITY: INCOME INSECURITY: HOW HARD IS IT FOR YOU TO PAY FOR THE VERY BASICS LIKE FOOD, HOUSING, MEDICAL CARE, AND HEATING?: NOT HARD AT ALL

## 2023-04-04 NOTE — PATIENT INSTRUCTIONS
SURVEY:    You may be receiving a survey from [employer] regarding your visit today. You may get this in the mail, through your MyChart, or in your email. Please complete the survey to enable us to provide the highest quality of care to you and your family. If you cannot score us a very good (5 Stars) on any question, please call the office to discuss how we could of made your experience exceptional.    Thank you!     Dr. Chi Grant, LPLAMIN Doshi, RN   Kaykay Daly, [address] [place] [address]    Phone: [phone]  Fax: [phone]    Office Hours:   Shira Mace, [address], F: 8-5

## 2023-04-04 NOTE — PROGRESS NOTES
Take 3 tablets by mouth daily 180 tablet 0    Lancets Misc. MISC 1 applicator by Does not apply route daily 100 each 2    nystatin (MYCOSTATIN) 586126 UNIT/GM powder Apply 3 times daily. 60 g 2    pantoprazole (PROTONIX) 40 MG tablet Take 1 tablet by mouth 2 times daily 180 tablet 3    potassium chloride (KLOR-CON M) 20 MEQ TBCR extended release tablet Take 1 tablet by mouth 3 times daily 270 tablet 0    dapagliflozin (FARXIGA) 5 MG tablet Take 1 tablet by mouth every morning 90 tablet 0    glipiZIDE (GLUCOTROL) 5 MG tablet Take 1 tablet by mouth daily 90 tablet 0    potassium chloride (KLOR-CON M) 20 MEQ extended release tablet TAKE 1 TABLET BY MOUTH THREE TIMES DAILY 90 tablet 1    gabapentin (NEURONTIN) 300 MG capsule TAKE 1 CAPSULE BY MOUTH THREE TIMES DAILY      Blood Glucose Monitoring Suppl w/Device KIT 1 kit by Does not apply route Daily Please check blood glucose levels daily. 1 kit 0    glucose monitoring (FREESTYLE FREEDOM) kit 1 kit by Does not apply route daily 1 kit 0    NONFORMULARY Tumeric      acetaminophen (TYLENOL) 500 MG tablet Take 2 tablets by mouth daily as needed      ascorbic acid (VITAMIN C) 500 MG tablet Take 1 tablet by mouth daily 3 in am and 2 at hs      Glucosamine-Chondroitin (GLUCOSAMINE CHONDR COMPLEX PO) Take by mouth      Cinnamon 500 MG CAPS Take 5 capsules by mouth daily      aspirin 81 MG EC tablet Take 1 tablet by mouth daily      Cyanocobalamin (B-12 PO) Take 1,000 mg by mouth. Pt states to be taking 2500 mg daily at this time. Omega-3 Fatty Acids (FISH OIL) 1000 MG CAPS Take 3 capsules by mouth daily      loratadine (CLARITIN) 10 MG tablet Take 0.5 tablets by mouth daily       No current facility-administered medications for this visit. No follow-ups on file.       Electronically signed by Benjamin Sheridan MD on 4/4/2023 at 4:17 PM

## 2023-04-18 ENCOUNTER — TELEPHONE (OUTPATIENT)
Dept: PRIMARY CARE CLINIC | Age: 73
End: 2023-04-18

## 2023-04-18 NOTE — TELEPHONE ENCOUNTER
Hello, it looks like he had been on Glipizde prior, I have cc'manuel Driscoll Sandra to see if there any more resources to help us with this, thank you.   Electronically signed by Tasia Acuña MD on 4/18/2023 at 2:21 PM

## 2023-04-19 ENCOUNTER — CARE COORDINATION (OUTPATIENT)
Dept: CARE COORDINATION | Age: 73
End: 2023-04-19

## 2023-04-19 NOTE — CARE COORDINATION
ACC: Domenico Castaneda RN    CC outreach call placed to patient per PCP referral for care coordination. Unable to reach [place] x 3 attempts today. Phone rings and rings with no answer and voicemail not set up. Future Appointments   Date Time Provider Etta Jessica   7/10/2023  3:30 PM Tasia Acuña MD [facility] ([facility]       Care Coordination Plan of Care:    This nurse Care Coordinator will  - await call back from patient, and if no return call; will attempt to reach patient back again this week if able.   -update PCP on outreach attempts with no success

## 2023-05-08 ENCOUNTER — HOSPITAL ENCOUNTER (EMERGENCY)
Age: 73
Discharge: HOME | End: 2023-05-08
Payer: MEDICARE

## 2023-05-08 ENCOUNTER — APPOINTMENT (OUTPATIENT)
Dept: GENERAL RADIOLOGY | Age: 73
End: 2023-05-08
Payer: MEDICARE

## 2023-05-08 VITALS
TEMPERATURE: 100.8 F | HEART RATE: 76 BPM | SYSTOLIC BLOOD PRESSURE: 120 MMHG | OXYGEN SATURATION: 95 % | DIASTOLIC BLOOD PRESSURE: 55 MMHG | RESPIRATION RATE: 14 BRPM

## 2023-05-08 DIAGNOSIS — U07.1 COVID: Primary | ICD-10-CM

## 2023-05-08 LAB
ABSOLUTE EOS #: 0.06 K/UL (ref 0–0.44)
ABSOLUTE IMMATURE GRANULOCYTE: <0.03 K/UL (ref 0–0.3)
ABSOLUTE LYMPH #: 0.71 K/UL (ref 1.1–3.7)
ABSOLUTE MONO #: 0.8 K/UL (ref 0.1–1.2)
ALBUMIN SERPL-MCNC: 4.1 G/DL (ref 3.5–5.2)
ALBUMIN/GLOBULIN RATIO: 1.6 (ref 1–2.5)
ALP SERPL-CCNC: 72 U/L (ref 40–129)
ALT SERPL-CCNC: 11 U/L (ref 5–41)
ANION GAP SERPL CALCULATED.3IONS-SCNC: 11 MMOL/L (ref 9–17)
AST SERPL-CCNC: 13 U/L
BASOPHILS # BLD: 1 % (ref 0–2)
BASOPHILS ABSOLUTE: 0.04 K/UL (ref 0–0.2)
BILIRUB SERPL-MCNC: 0.7 MG/DL (ref 0.3–1.2)
BNP SERPL-MCNC: 541 PG/ML
BUN SERPL-MCNC: 12 MG/DL (ref 8–23)
BUN/CREAT BLD: 14 (ref 9–20)
CALCIUM SERPL-MCNC: 9.4 MG/DL (ref 8.6–10.4)
CHLORIDE SERPL-SCNC: 102 MMOL/L (ref 98–107)
CO2 SERPL-SCNC: 27 MMOL/L (ref 20–31)
CREAT SERPL-MCNC: 0.88 MG/DL (ref 0.7–1.2)
EKG ATRIAL RATE: 75 BPM
EKG P AXIS: 31 DEGREES
EKG P-R INTERVAL: 154 MS
EKG Q-T INTERVAL: 402 MS
EKG QRS DURATION: 90 MS
EKG QTC CALCULATION (BAZETT): 448 MS
EKG R AXIS: -19 DEGREES
EKG T AXIS: 34 DEGREES
EKG VENTRICULAR RATE: 75 BPM
EOSINOPHILS RELATIVE PERCENT: 1 % (ref 1–4)
FLUAV AG SPEC QL: NEGATIVE
FLUBV AG SPEC QL: NEGATIVE
GFR SERPL CREATININE-BSD FRML MDRD: >60 ML/MIN/1.73M2
GLUCOSE SERPL-MCNC: 124 MG/DL (ref 70–99)
HCT VFR BLD AUTO: 37 % (ref 40.7–50.3)
HGB BLD-MCNC: 12.1 G/DL (ref 13–17)
IMMATURE GRANULOCYTES: 0 %
LYMPHOCYTES # BLD: 12 % (ref 24–43)
MCH RBC QN AUTO: 29.4 PG (ref 25.2–33.5)
MCHC RBC AUTO-ENTMCNC: 32.7 G/DL (ref 28.4–34.8)
MCV RBC AUTO: 90 FL (ref 82.6–102.9)
MONOCYTES # BLD: 14 % (ref 3–12)
NRBC AUTOMATED: 0 PER 100 WBC
PDW BLD-RTO: 13.3 % (ref 11.8–14.4)
PLATELET # BLD AUTO: 171 K/UL (ref 138–453)
PMV BLD AUTO: 9.4 FL (ref 8.1–13.5)
POTASSIUM SERPL-SCNC: 3.8 MMOL/L (ref 3.7–5.3)
PROT SERPL-MCNC: 6.6 G/DL (ref 6.4–8.3)
RBC # BLD: 4.11 M/UL (ref 4.21–5.77)
SARS-COV-2 RDRP RESP QL NAA+PROBE: DETECTED
SEG NEUTROPHILS: 72 % (ref 36–65)
SEGMENTED NEUTROPHILS ABSOLUTE COUNT: 4.24 K/UL (ref 1.5–8.1)
SODIUM SERPL-SCNC: 140 MMOL/L (ref 135–144)
SPECIMEN DESCRIPTION: ABNORMAL
TROPONIN I SERPL DL<=0.01 NG/ML-MCNC: 20 NG/L (ref 0–22)
WBC # BLD AUTO: 5.9 K/UL (ref 3.5–11.3)

## 2023-05-08 PROCEDURE — 80053 COMPREHEN METABOLIC PANEL: CPT

## 2023-05-08 PROCEDURE — 99285 EMERGENCY DEPT VISIT HI MDM: CPT

## 2023-05-08 PROCEDURE — 87635 SARS-COV-2 COVID-19 AMP PRB: CPT

## 2023-05-08 PROCEDURE — 36415 COLL VENOUS BLD VENIPUNCTURE: CPT

## 2023-05-08 PROCEDURE — 73030 X-RAY EXAM OF SHOULDER: CPT

## 2023-05-08 PROCEDURE — 84484 ASSAY OF TROPONIN QUANT: CPT

## 2023-05-08 PROCEDURE — 93005 ELECTROCARDIOGRAM TRACING: CPT | Performed by: EMERGENCY MEDICINE

## 2023-05-08 PROCEDURE — 93010 ELECTROCARDIOGRAM REPORT: CPT | Performed by: INTERNAL MEDICINE

## 2023-05-08 PROCEDURE — 71045 X-RAY EXAM CHEST 1 VIEW: CPT

## 2023-05-08 PROCEDURE — 85025 COMPLETE CBC W/AUTO DIFF WBC: CPT

## 2023-05-08 PROCEDURE — 83880 ASSAY OF NATRIURETIC PEPTIDE: CPT

## 2023-05-08 PROCEDURE — 87804 INFLUENZA ASSAY W/OPTIC: CPT

## 2023-05-08 PROCEDURE — 6370000000 HC RX 637 (ALT 250 FOR IP): Performed by: PHYSICIAN ASSISTANT

## 2023-05-08 RX ORDER — ACETAMINOPHEN 500 MG
1000 TABLET ORAL ONCE
Status: COMPLETED | OUTPATIENT
Start: 2023-05-08 | End: 2023-05-08

## 2023-05-08 RX ADMIN — ACETAMINOPHEN 1000 MG: 500 TABLET, FILM COATED ORAL at 12:30

## 2023-05-08 ASSESSMENT — PAIN DESCRIPTION - ORIENTATION
ORIENTATION: LEFT
ORIENTATION: LEFT

## 2023-05-08 ASSESSMENT — PAIN DESCRIPTION - DESCRIPTORS: DESCRIPTORS: DISCOMFORT

## 2023-05-08 ASSESSMENT — PAIN SCALES - GENERAL
PAINLEVEL_OUTOF10: 4
PAINLEVEL_OUTOF10: 4

## 2023-05-08 ASSESSMENT — PAIN - FUNCTIONAL ASSESSMENT: PAIN_FUNCTIONAL_ASSESSMENT: 0-10

## 2023-05-08 ASSESSMENT — LIFESTYLE VARIABLES: HOW OFTEN DO YOU HAVE A DRINK CONTAINING ALCOHOL: NEVER

## 2023-05-08 ASSESSMENT — PAIN DESCRIPTION - LOCATION
LOCATION: SHOULDER
LOCATION: SHOULDER

## 2023-05-08 NOTE — ED NOTES
Pt is a poor historian, unable to remember if he took him medications this am. Unfamiliar with prescribed medications.      He Vitale RN  05/08/23 5521

## 2023-05-08 NOTE — ED PROVIDER NOTES
[address]      Pt Name: Padmini Nixon  MRN: [id number]  [place] 1950  Date of evaluation: 5/8/2023  Provider: Rita Bliss PA-C    CHIEF COMPLAINT       Chief Complaint   Patient presents with    Fall     Pt fell prior to arrival. Left shoulder pain         HISTORY OF PRESENT ILLNESS      Padmini Nixon is a 75 y.o. male who presents to the emergency department complaining of left shoulder pain. Patient states that he lost his footing and fell against the wall injuring his left shoulder. He denies any loss of consciousness. Denies any head injury. Patient is living out of a motel, he states that his wife has been coughing and has been ill. He denies any coughs, chest pain, shortness of breath, abdominal pain, nausea, vomiting. He did have a fever upon evaluation but denies any symptoms. Patient states that he is unsteady on his feet and this is what caused him to fall today. REVIEW OF SYSTEMS       Review of Systems   AS STATED IN HPI      PAST MEDICAL HISTORY     Past Medical History:   Diagnosis Date    Aortic aneurysm, thoracic ([facility] 7/18/2016    Ataxia     Gait ataxia x 1 yr ago (approx. 2010)    CHF (congestive heart failure) ([facility])     COPD (chronic obstructive pulmonary disease) ([facility] 4/4/2016    H/O cardiovascular stress test 08/28/2018    Equivocal myocardial perfusion study. There is a small/ moderate perfusion defect of mild intestiy in the inferoseptal and septal regions during stress imaging which is most consistent with ischemia but may be due to artifact. Overall, these results are most consistent with a low risk for CAD. H/O echocardiogram 06/30/2016    EF >60%. Mild LV hypertrophy with a normal LV cavity size. Aortic leaflet calcification with moderate aortic stenosis with a mean greadient of 20 mmHg. Mild pulmonary hypertension estimated right ventricular systolic pressure of 37 mmHg. Mild tricuspid regurgitation.  Moderate

## 2023-05-22 NOTE — TELEPHONE ENCOUNTER
Patients wife calling in for refill to [facility] and also needs letter to renew handicap placard.
Patient Specific Otc Recommendations (Will Not Stick From Patient To Patient): Zyrtec nightly
Detail Level: Zone

## 2023-06-19 DIAGNOSIS — E11.40 TYPE 2 DIABETES MELLITUS WITH DIABETIC NEUROPATHY, WITHOUT LONG-TERM CURRENT USE OF INSULIN (HCC): ICD-10-CM

## 2023-06-19 RX ORDER — GLUCOSAMINE HCL/CHONDROITIN SU 500-400 MG
CAPSULE ORAL
Qty: 100 STRIP | Refills: 2 | Status: SHIPPED | OUTPATIENT
Start: 2023-06-19

## 2023-06-27 RX ORDER — BUSPIRONE HYDROCHLORIDE 15 MG/1
15 TABLET ORAL DAILY
Qty: 90 TABLET | Refills: 0 | Status: SHIPPED | OUTPATIENT
Start: 2023-06-27

## 2023-07-21 RX ORDER — FUROSEMIDE 20 MG/1
60 TABLET ORAL DAILY
Qty: 90 TABLET | Refills: 0 | Status: SHIPPED | OUTPATIENT
Start: 2023-07-21 | End: 2023-08-20

## 2023-07-21 RX ORDER — ATORVASTATIN CALCIUM 40 MG/1
40 TABLET, FILM COATED ORAL DAILY
Qty: 30 TABLET | Refills: 0 | Status: SHIPPED | OUTPATIENT
Start: 2023-07-21

## 2023-07-21 RX ORDER — BENAZEPRIL HYDROCHLORIDE 5 MG/1
5 TABLET, FILM COATED ORAL DAILY
Qty: 30 TABLET | Refills: 0 | Status: SHIPPED | OUTPATIENT
Start: 2023-07-21

## 2023-08-18 ENCOUNTER — OFFICE VISIT (OUTPATIENT)
Dept: PRIMARY CARE CLINIC | Age: 73
End: 2023-08-18
Payer: MEDICARE

## 2023-08-18 VITALS
HEART RATE: 72 BPM | WEIGHT: 225.8 LBS | DIASTOLIC BLOOD PRESSURE: 76 MMHG | OXYGEN SATURATION: 96 % | RESPIRATION RATE: 16 BRPM | SYSTOLIC BLOOD PRESSURE: 126 MMHG | BODY MASS INDEX: 34.33 KG/M2

## 2023-08-18 DIAGNOSIS — E11.9 TYPE 2 DIABETES MELLITUS WITHOUT COMPLICATION, WITHOUT LONG-TERM CURRENT USE OF INSULIN (HCC): ICD-10-CM

## 2023-08-18 DIAGNOSIS — I50.32 CHRONIC DIASTOLIC HEART FAILURE (HCC): Primary | ICD-10-CM

## 2023-08-18 DIAGNOSIS — I71.21 ANEURYSM OF ASCENDING AORTA WITHOUT RUPTURE (HCC): ICD-10-CM

## 2023-08-18 PROCEDURE — 3074F SYST BP LT 130 MM HG: CPT | Performed by: FAMILY MEDICINE

## 2023-08-18 PROCEDURE — 99214 OFFICE O/P EST MOD 30 MIN: CPT | Performed by: FAMILY MEDICINE

## 2023-08-18 PROCEDURE — 1123F ACP DISCUSS/DSCN MKR DOCD: CPT | Performed by: FAMILY MEDICINE

## 2023-08-18 PROCEDURE — 3078F DIAST BP <80 MM HG: CPT | Performed by: FAMILY MEDICINE

## 2023-08-18 RX ORDER — GLIPIZIDE 5 MG/1
5 TABLET ORAL DAILY
Qty: 90 TABLET | Refills: 0 | Status: SHIPPED | OUTPATIENT
Start: 2023-08-18

## 2023-08-18 RX ORDER — GABAPENTIN 300 MG/1
300 CAPSULE ORAL 3 TIMES DAILY
Qty: 90 CAPSULE | Refills: 0 | Status: SHIPPED | OUTPATIENT
Start: 2023-08-18 | End: 2023-09-17

## 2023-08-21 NOTE — PROGRESS NOTES
Constitutional:  Negative for chills and fever. Respiratory:  Negative for cough, shortness of breath and wheezing. Cardiovascular:  Negative for chest pain. Gastrointestinal:  Negative for abdominal pain, diarrhea and vomiting. Musculoskeletal:  Negative for back pain and neck pain. Skin:  Negative for color change. Neurological:  Negative for dizziness, tremors, weakness and light-headedness. Physical Exam   Physical Exam  Constitutional:       Appearance: Well-developed. HENT:      Head: Normocephalic. Right Ear: External ear normal.      Left Ear: External ear normal.   Cardiovascular:      Rate and Rhythm: Normal rate and regular rhythm. Heart sounds: Normal heart sounds. No murmur heard. Pulmonary:      Effort: Pulmonary effort is normal.      Breath sounds: Normal breath sounds. No crackles no wheezing. Abdominal:      Palpations: Abdomen is soft. Tenderness: There is no abdominal tenderness. Musculoskeletal:         General: Normal range of motion. Cervical back: Normal range of motion. Skin:     General: Skin is warm. Findings: No erythema.  - There is 2+ pitting edema of the bilateral lower extremities. Neurological:      Mental Status: Alert and oriented to person, place, and time. Psychiatric:         Behavior: Behavior normal.     Vitals:    08/18/23 1602   BP: 126/76   Pulse: 72   Resp: 16   SpO2: 96%         Assessment and Plan     CHF, diabetes, AAA    Continue Lasix at this time, consider placing back on Colon Colon taking into account comorbid cardiac disease and diabetes. Recommended to check blood sugars minimum of 2 times per day including a.m. fasting. Keep a log and keep track of all blood sugars so that medications may be adjusted. Repeat labs to include BMP, A1c to evaluate electrolytes glycemic control.     Obtain CTA chest with contrast to evaluate AAA

## 2023-08-28 RX ORDER — ATORVASTATIN CALCIUM 40 MG/1
40 TABLET, FILM COATED ORAL DAILY
Qty: 90 TABLET | Refills: 1 | Status: SHIPPED | OUTPATIENT
Start: 2023-08-28

## 2023-08-28 RX ORDER — BENAZEPRIL HYDROCHLORIDE 5 MG/1
5 TABLET, FILM COATED ORAL DAILY
Qty: 90 TABLET | Refills: 1 | Status: SHIPPED | OUTPATIENT
Start: 2023-08-28

## 2023-10-19 ENCOUNTER — APPOINTMENT (OUTPATIENT)
Dept: VASCULAR LAB | Age: 73
End: 2023-10-19
Attending: STUDENT IN AN ORGANIZED HEALTH CARE EDUCATION/TRAINING PROGRAM
Payer: MEDICARE

## 2023-10-19 ENCOUNTER — HOSPITAL ENCOUNTER (EMERGENCY)
Age: 73
Discharge: HOME | End: 2023-10-19
Attending: STUDENT IN AN ORGANIZED HEALTH CARE EDUCATION/TRAINING PROGRAM
Payer: MEDICARE

## 2023-10-19 ENCOUNTER — APPOINTMENT (OUTPATIENT)
Dept: GENERAL RADIOLOGY | Age: 73
End: 2023-10-19
Payer: MEDICARE

## 2023-10-19 VITALS
DIASTOLIC BLOOD PRESSURE: 60 MMHG | TEMPERATURE: 98.7 F | OXYGEN SATURATION: 95 % | RESPIRATION RATE: 20 BRPM | SYSTOLIC BLOOD PRESSURE: 123 MMHG | HEART RATE: 61 BPM

## 2023-10-19 DIAGNOSIS — M79.605 LEFT LEG PAIN: Primary | ICD-10-CM

## 2023-10-19 DIAGNOSIS — R60.0 BILATERAL LOWER EXTREMITY EDEMA: ICD-10-CM

## 2023-10-19 LAB
ANION GAP SERPL CALCULATED.3IONS-SCNC: 7 MMOL/L (ref 9–17)
BASOPHILS # BLD: 0.08 K/UL (ref 0–0.2)
BASOPHILS NFR BLD: 1 % (ref 0–2)
BNP SERPL-MCNC: 381 PG/ML
BUN SERPL-MCNC: 12 MG/DL (ref 8–23)
BUN/CREAT SERPL: 13 (ref 9–20)
CALCIUM SERPL-MCNC: 9.1 MG/DL (ref 8.6–10.4)
CHLORIDE SERPL-SCNC: 108 MMOL/L (ref 98–107)
CO2 SERPL-SCNC: 29 MMOL/L (ref 20–31)
CREAT SERPL-MCNC: 0.9 MG/DL (ref 0.7–1.2)
CRP SERPL HS-MCNC: <3 MG/L (ref 0–5)
D DIMER PPP FEU-MCNC: 0.97 UG/ML FEU (ref 0–0.59)
EOSINOPHIL # BLD: 0.4 K/UL (ref 0–0.44)
EOSINOPHILS RELATIVE PERCENT: 6 % (ref 1–4)
ERYTHROCYTE [DISTWIDTH] IN BLOOD BY AUTOMATED COUNT: 13.6 % (ref 11.8–14.4)
ERYTHROCYTE [SEDIMENTATION RATE] IN BLOOD BY PHOTOMETRIC METHOD: 9 MM/HR (ref 0–20)
GFR SERPL CREATININE-BSD FRML MDRD: >60 ML/MIN/1.73M2
GLUCOSE SERPL-MCNC: 160 MG/DL (ref 70–99)
HCT VFR BLD AUTO: 36.5 % (ref 40.7–50.3)
HGB BLD-MCNC: 11.8 G/DL (ref 13–17)
IMM GRANULOCYTES # BLD AUTO: <0.03 K/UL (ref 0–0.3)
IMM GRANULOCYTES NFR BLD: 0 %
LACTATE BLDV-SCNC: 1.1 MMOL/L (ref 0.5–2.2)
LYMPHOCYTES NFR BLD: 2.57 K/UL (ref 1.1–3.7)
LYMPHOCYTES RELATIVE PERCENT: 38 % (ref 24–43)
MAGNESIUM SERPL-MCNC: 2 MG/DL (ref 1.6–2.6)
MCH RBC QN AUTO: 29.8 PG (ref 25.2–33.5)
MCHC RBC AUTO-ENTMCNC: 32.3 G/DL (ref 28.4–34.8)
MCV RBC AUTO: 92.2 FL (ref 82.6–102.9)
MONOCYTES NFR BLD: 0.59 K/UL (ref 0.1–1.2)
MONOCYTES NFR BLD: 9 % (ref 3–12)
NEUTROPHILS NFR BLD: 46 % (ref 36–65)
NEUTS SEG NFR BLD: 3.21 K/UL (ref 1.5–8.1)
NRBC BLD-RTO: 0 PER 100 WBC
PLATELET # BLD AUTO: 214 K/UL (ref 138–453)
PMV BLD AUTO: 9.1 FL (ref 8.1–13.5)
POTASSIUM SERPL-SCNC: 4.3 MMOL/L (ref 3.7–5.3)
RBC # BLD AUTO: 3.96 M/UL (ref 4.21–5.77)
SODIUM SERPL-SCNC: 144 MMOL/L (ref 135–144)
WBC OTHER # BLD: 6.9 K/UL (ref 3.5–11.3)

## 2023-10-19 PROCEDURE — 96374 THER/PROPH/DIAG INJ IV PUSH: CPT

## 2023-10-19 PROCEDURE — 93970 EXTREMITY STUDY: CPT | Performed by: STUDENT IN AN ORGANIZED HEALTH CARE EDUCATION/TRAINING PROGRAM

## 2023-10-19 PROCEDURE — 86140 C-REACTIVE PROTEIN: CPT

## 2023-10-19 PROCEDURE — 36415 COLL VENOUS BLD VENIPUNCTURE: CPT

## 2023-10-19 PROCEDURE — 6360000002 HC RX W HCPCS: Performed by: STUDENT IN AN ORGANIZED HEALTH CARE EDUCATION/TRAINING PROGRAM

## 2023-10-19 PROCEDURE — 99284 EMERGENCY DEPT VISIT MOD MDM: CPT

## 2023-10-19 PROCEDURE — 73630 X-RAY EXAM OF FOOT: CPT

## 2023-10-19 PROCEDURE — 85652 RBC SED RATE AUTOMATED: CPT

## 2023-10-19 PROCEDURE — 83880 ASSAY OF NATRIURETIC PEPTIDE: CPT

## 2023-10-19 PROCEDURE — 83605 ASSAY OF LACTIC ACID: CPT

## 2023-10-19 PROCEDURE — 85379 FIBRIN DEGRADATION QUANT: CPT

## 2023-10-19 PROCEDURE — 85025 COMPLETE CBC W/AUTO DIFF WBC: CPT

## 2023-10-19 PROCEDURE — 80048 BASIC METABOLIC PNL TOTAL CA: CPT

## 2023-10-19 PROCEDURE — 96376 TX/PRO/DX INJ SAME DRUG ADON: CPT

## 2023-10-19 PROCEDURE — 96375 TX/PRO/DX INJ NEW DRUG ADDON: CPT

## 2023-10-19 PROCEDURE — 83735 ASSAY OF MAGNESIUM: CPT

## 2023-10-19 PROCEDURE — 6360000002 HC RX W HCPCS: Performed by: EMERGENCY MEDICINE

## 2023-10-19 PROCEDURE — 93970 EXTREMITY STUDY: CPT

## 2023-10-19 RX ORDER — KETOROLAC TROMETHAMINE 15 MG/ML
30 INJECTION, SOLUTION INTRAMUSCULAR; INTRAVENOUS ONCE
Status: COMPLETED | OUTPATIENT
Start: 2023-10-19 | End: 2023-10-19

## 2023-10-19 RX ORDER — FUROSEMIDE 10 MG/ML
40 INJECTION INTRAMUSCULAR; INTRAVENOUS ONCE
Status: COMPLETED | OUTPATIENT
Start: 2023-10-19 | End: 2023-10-19

## 2023-10-19 RX ORDER — FUROSEMIDE 10 MG/ML
20 INJECTION INTRAMUSCULAR; INTRAVENOUS ONCE
Status: COMPLETED | OUTPATIENT
Start: 2023-10-19 | End: 2023-10-19

## 2023-10-19 RX ADMIN — KETOROLAC TROMETHAMINE 30 MG: 15 INJECTION, SOLUTION INTRAMUSCULAR; INTRAVENOUS at 03:50

## 2023-10-19 RX ADMIN — FUROSEMIDE 40 MG: 10 INJECTION, SOLUTION INTRAMUSCULAR; INTRAVENOUS at 03:51

## 2023-10-19 RX ADMIN — FUROSEMIDE 20 MG: 10 INJECTION, SOLUTION INTRAMUSCULAR; INTRAVENOUS at 10:11

## 2023-10-19 ASSESSMENT — ENCOUNTER SYMPTOMS
EYE PAIN: 0
SHORTNESS OF BREATH: 0
BACK PAIN: 0
VOMITING: 0
COLOR CHANGE: 0
EYE DISCHARGE: 0
COUGH: 0
ABDOMINAL PAIN: 0
TROUBLE SWALLOWING: 0
SINUS PRESSURE: 0
SINUS PAIN: 0
CHEST TIGHTNESS: 0
NAUSEA: 0
FACIAL SWELLING: 0

## 2023-10-19 NOTE — DISCHARGE INSTRUCTIONS
Continue home medications as prescribed. Follow-up with your primary care provider soon as possible.   Return for any acute concerns

## 2023-10-19 NOTE — ED NOTES
Pt is a poor historian. Unable to verify home meds at this time.       Rivera Ugalde RN  10/19/23 [id number]

## 2023-10-19 NOTE — ED NOTES
Pt resting in bed comfortably, NAD.  Wife at bedside      Robbin Huff, [address]  10/19/23 1988

## 2023-11-07 DIAGNOSIS — E11.9 TYPE 2 DIABETES MELLITUS WITHOUT COMPLICATION, WITHOUT LONG-TERM CURRENT USE OF INSULIN (HCC): ICD-10-CM

## 2023-11-07 RX ORDER — GABAPENTIN 300 MG/1
300 CAPSULE ORAL 3 TIMES DAILY
Qty: 90 CAPSULE | Refills: 0 | Status: SHIPPED | OUTPATIENT
Start: 2023-11-07 | End: 2023-12-07

## 2023-11-07 RX ORDER — GLIPIZIDE 5 MG/1
5 TABLET ORAL DAILY
Qty: 90 TABLET | Refills: 0 | Status: SHIPPED | OUTPATIENT
Start: 2023-11-07

## 2023-11-07 RX ORDER — ATORVASTATIN CALCIUM 40 MG/1
40 TABLET, FILM COATED ORAL DAILY
Qty: 90 TABLET | Refills: 1 | Status: SHIPPED | OUTPATIENT
Start: 2023-11-07

## 2023-11-14 ENCOUNTER — HOSPITAL ENCOUNTER (OUTPATIENT)
Age: 73
Discharge: HOME | End: 2023-11-14
Payer: MEDICARE

## 2023-11-14 DIAGNOSIS — E11.9 TYPE 2 DIABETES MELLITUS WITHOUT COMPLICATION, WITHOUT LONG-TERM CURRENT USE OF INSULIN (HCC): ICD-10-CM

## 2023-11-14 DIAGNOSIS — I50.32 CHRONIC DIASTOLIC HEART FAILURE (HCC): ICD-10-CM

## 2023-11-14 LAB
ANION GAP SERPL CALCULATED.3IONS-SCNC: 10 MMOL/L (ref 9–17)
BUN SERPL-MCNC: 12 MG/DL (ref 8–23)
BUN/CREAT SERPL: 17 (ref 9–20)
CALCIUM SERPL-MCNC: 9.5 MG/DL (ref 8.6–10.4)
CHLORIDE SERPL-SCNC: 102 MMOL/L (ref 98–107)
CO2 SERPL-SCNC: 27 MMOL/L (ref 20–31)
CREAT SERPL-MCNC: 0.7 MG/DL (ref 0.7–1.2)
GFR SERPL CREATININE-BSD FRML MDRD: >60 ML/MIN/1.73M2
GLUCOSE SERPL-MCNC: 132 MG/DL (ref 70–99)
POTASSIUM SERPL-SCNC: 4.3 MMOL/L (ref 3.7–5.3)
SODIUM SERPL-SCNC: 139 MMOL/L (ref 135–144)

## 2023-11-14 PROCEDURE — 36415 COLL VENOUS BLD VENIPUNCTURE: CPT

## 2023-11-14 PROCEDURE — 80048 BASIC METABOLIC PNL TOTAL CA: CPT

## 2023-11-14 PROCEDURE — 83036 HEMOGLOBIN GLYCOSYLATED A1C: CPT

## 2023-11-15 LAB
EST. AVERAGE GLUCOSE BLD GHB EST-MCNC: 131 MG/DL
HBA1C MFR BLD: 6.2 % (ref 4–6)

## 2023-11-16 ENCOUNTER — TELEPHONE (OUTPATIENT)
Dept: PRIMARY CARE CLINIC | Age: 73
End: 2023-11-16

## 2023-11-16 RX ORDER — POTASSIUM CHLORIDE 1500 MG/1
20 TABLET, EXTENDED RELEASE ORAL 3 TIMES DAILY
Qty: 270 TABLET | Refills: 0 | Status: SHIPPED | OUTPATIENT
Start: 2023-11-16

## 2023-11-16 RX ORDER — PANTOPRAZOLE SODIUM 40 MG/1
40 TABLET, DELAYED RELEASE ORAL 2 TIMES DAILY
Qty: 180 TABLET | Refills: 3 | Status: SHIPPED | OUTPATIENT
Start: 2023-11-16

## 2023-11-16 NOTE — TELEPHONE ENCOUNTER
Pt walked in to have RX refills - Potassium Chloride and Pantoprazole (Protonix) refilled. Will be out before appt.   Thanks

## 2023-12-24 RX ORDER — BUSPIRONE HYDROCHLORIDE 15 MG/1
15 TABLET ORAL DAILY
Qty: 90 TABLET | Refills: 0 | OUTPATIENT
Start: 2023-12-24

## 2023-12-24 RX ORDER — POTASSIUM CHLORIDE 1500 MG/1
20 TABLET, EXTENDED RELEASE ORAL 3 TIMES DAILY
Qty: 270 TABLET | Refills: 0 | OUTPATIENT
Start: 2023-12-24

## 2024-03-07 DIAGNOSIS — E11.9 TYPE 2 DIABETES MELLITUS WITHOUT COMPLICATION, WITHOUT LONG-TERM CURRENT USE OF INSULIN (HCC): ICD-10-CM

## 2024-03-07 RX ORDER — FUROSEMIDE 20 MG/1
60 TABLET ORAL DAILY
Qty: 90 TABLET | Refills: 0 | Status: SHIPPED | OUTPATIENT
Start: 2024-03-07 | End: 2024-04-06

## 2024-03-07 RX ORDER — GABAPENTIN 300 MG/1
300 CAPSULE ORAL 3 TIMES DAILY
Qty: 90 CAPSULE | Refills: 0 | Status: SHIPPED | OUTPATIENT
Start: 2024-03-07 | End: 2024-04-06

## 2024-03-22 ENCOUNTER — OFFICE VISIT (OUTPATIENT)
Dept: PRIMARY CARE CLINIC | Age: 74
End: 2024-03-22

## 2024-03-22 VITALS
HEART RATE: 62 BPM | HEIGHT: 68 IN | BODY MASS INDEX: 37.07 KG/M2 | SYSTOLIC BLOOD PRESSURE: 128 MMHG | RESPIRATION RATE: 16 BRPM | DIASTOLIC BLOOD PRESSURE: 80 MMHG | WEIGHT: 244.6 LBS

## 2024-03-22 DIAGNOSIS — I71.21 ANEURYSM OF ASCENDING AORTA WITHOUT RUPTURE (HCC): ICD-10-CM

## 2024-03-22 DIAGNOSIS — I50.32 CHRONIC DIASTOLIC HEART FAILURE (HCC): ICD-10-CM

## 2024-03-22 DIAGNOSIS — E11.40 TYPE 2 DIABETES MELLITUS WITH DIABETIC NEUROPATHY, WITHOUT LONG-TERM CURRENT USE OF INSULIN (HCC): Primary | ICD-10-CM

## 2024-03-22 DIAGNOSIS — I10 ESSENTIAL HYPERTENSION: ICD-10-CM

## 2024-03-22 RX ORDER — POTASSIUM CHLORIDE 20 MEQ/1
20 TABLET, EXTENDED RELEASE ORAL 3 TIMES DAILY
COMMUNITY
Start: 2023-12-23

## 2024-03-22 RX ORDER — POTASSIUM CHLORIDE 1500 MG/1
20 TABLET, EXTENDED RELEASE ORAL 3 TIMES DAILY
Qty: 270 TABLET | Refills: 0 | Status: SHIPPED | OUTPATIENT
Start: 2024-03-22

## 2024-03-22 ASSESSMENT — PATIENT HEALTH QUESTIONNAIRE - PHQ9
4. FEELING TIRED OR HAVING LITTLE ENERGY: NOT AT ALL
2. FEELING DOWN, DEPRESSED OR HOPELESS: NOT AT ALL
6. FEELING BAD ABOUT YOURSELF - OR THAT YOU ARE A FAILURE OR HAVE LET YOURSELF OR YOUR FAMILY DOWN: NOT AT ALL
1. LITTLE INTEREST OR PLEASURE IN DOING THINGS: NOT AT ALL
3. TROUBLE FALLING OR STAYING ASLEEP: NOT AT ALL
SUM OF ALL RESPONSES TO PHQ QUESTIONS 1-9: 0
SUM OF ALL RESPONSES TO PHQ QUESTIONS 1-9: 0
SUM OF ALL RESPONSES TO PHQ9 QUESTIONS 1 & 2: 0
SUM OF ALL RESPONSES TO PHQ QUESTIONS 1-9: 0
8. MOVING OR SPEAKING SO SLOWLY THAT OTHER PEOPLE COULD HAVE NOTICED. OR THE OPPOSITE, BEING SO FIGETY OR RESTLESS THAT YOU HAVE BEEN MOVING AROUND A LOT MORE THAN USUAL: NOT AT ALL
9. THOUGHTS THAT YOU WOULD BE BETTER OFF DEAD, OR OF HURTING YOURSELF: NOT AT ALL
7. TROUBLE CONCENTRATING ON THINGS, SUCH AS READING THE NEWSPAPER OR WATCHING TELEVISION: NOT AT ALL
SUM OF ALL RESPONSES TO PHQ QUESTIONS 1-9: 0
10. IF YOU CHECKED OFF ANY PROBLEMS, HOW DIFFICULT HAVE THESE PROBLEMS MADE IT FOR YOU TO DO YOUR WORK, TAKE CARE OF THINGS AT HOME, OR GET ALONG WITH OTHER PEOPLE: NOT DIFFICULT AT ALL
5. POOR APPETITE OR OVEREATING: NOT AT ALL

## 2024-03-22 NOTE — PROGRESS NOTES
[facility]      Patient:  Derick Posadas 73 y.o. male     Date of Service: 8/21/23      Chiefcomplaint:   Chief Complaint   Patient presents with    Hypertension     Patient here for follow-up of elevated blood pressure. He is exercising and is not adherent to low salt diet.  Blood pressure is well controlled at home. Cardiac symptoms none. Patient denies none.  Cardiovascular risk factors: none. Use of agents associated with hypertension: none. History of target organ damage: none     Diabetes     Medication compliance:  compliant most of the time  Diabetic diet compliance:  compliant most of the time  Current exercise: no regular exercise  How long are you exercising during the week?   Frequency of testing:   Home blood sugar records:   Any episodes of hypoglycemia? no  Eye exam current (within one year): yes  Diabetic foot check in the past year Yes   reports that he quit smoking about 55 years ago. His smoking use included cigarettes.         History of Present Illness     73-year-old male in office to follow-up on history of CHF, history of AAA, diabetes.    Reviewed most recent A1c is 6.2 11/2023.  Patient is currently maintained on glipizide 5 mg daily.  Tolerates medications well without side effects or intolerances.  Reports no recent hypoglycemic episodes.  Was trialed on Farxiga however was unable to afford the medication.  No current reports of hyper or hypoglycemic symptoms.  Does not check home blood sugars.    Also notes a chronic history of CHF, last echo from 2018 reviewed.  No current reports of symptoms at this time such as paroxysmal nocturnal dyspnea, orthopnea however does have significant amount of edema in the bilateral lower extremity.  He was previously maintained on Lasix 60 mg daily (3 20 mg tablets) however has only been taking about 2 tablets/day for a total of 40 mg daily.   Does not check daily weights/dry weight.  He is chronically noncompliant with

## 2024-03-22 NOTE — PATIENT INSTRUCTIONS
Take 4 tablets of the lasix 20mg ONLY FOR ONE WEEK    Take the medication around 3 or 4 PM    Then go back to taking 3 tablets of the lasix 20mg as usual    Call to schedule CAT SCAN

## 2024-04-10 DIAGNOSIS — E11.9 TYPE 2 DIABETES MELLITUS WITHOUT COMPLICATION, WITHOUT LONG-TERM CURRENT USE OF INSULIN (HCC): ICD-10-CM

## 2024-04-10 NOTE — TELEPHONE ENCOUNTER
Patient wife called in requesting refills on Husbands medications. Order pended and pharmacy verified. One of the medications is lasix 20mg- 3 tablets by mouth daily. Does he still need this one? Or was it only a month long medication.

## 2024-04-11 RX ORDER — BENAZEPRIL HYDROCHLORIDE 5 MG/1
5 TABLET ORAL DAILY
Qty: 90 TABLET | Refills: 1 | Status: SHIPPED | OUTPATIENT
Start: 2024-04-11

## 2024-04-11 RX ORDER — BUSPIRONE HYDROCHLORIDE 15 MG/1
15 TABLET ORAL DAILY
Qty: 90 TABLET | Refills: 0 | Status: SHIPPED | OUTPATIENT
Start: 2024-04-11

## 2024-04-11 RX ORDER — FUROSEMIDE 20 MG/1
60 TABLET ORAL DAILY
Qty: 270 TABLET | Refills: 0 | Status: SHIPPED | OUTPATIENT
Start: 2024-04-11 | End: 2024-07-10

## 2024-04-11 RX ORDER — GLIPIZIDE 5 MG/1
5 TABLET ORAL DAILY
Qty: 90 TABLET | Refills: 0 | Status: SHIPPED | OUTPATIENT
Start: 2024-04-11

## 2024-04-11 RX ORDER — ATORVASTATIN CALCIUM 40 MG/1
40 TABLET, FILM COATED ORAL DAILY
Qty: 90 TABLET | Refills: 1 | Status: SHIPPED | OUTPATIENT
Start: 2024-04-11

## 2024-04-29 ENCOUNTER — OFFICE VISIT (OUTPATIENT)
Dept: PRIMARY CARE CLINIC | Age: 74
End: 2024-04-29
Payer: COMMERCIAL

## 2024-04-29 ENCOUNTER — HOSPITAL ENCOUNTER (OUTPATIENT)
Age: 74
Discharge: HOME | End: 2024-04-29
Payer: COMMERCIAL

## 2024-04-29 VITALS
BODY MASS INDEX: 36.28 KG/M2 | DIASTOLIC BLOOD PRESSURE: 78 MMHG | HEIGHT: 68 IN | WEIGHT: 239.4 LBS | HEART RATE: 83 BPM | OXYGEN SATURATION: 98 % | RESPIRATION RATE: 18 BRPM | SYSTOLIC BLOOD PRESSURE: 116 MMHG

## 2024-04-29 DIAGNOSIS — E11.40 TYPE 2 DIABETES MELLITUS WITH DIABETIC NEUROPATHY, WITHOUT LONG-TERM CURRENT USE OF INSULIN (HCC): ICD-10-CM

## 2024-04-29 DIAGNOSIS — R42 DIZZINESS: Primary | ICD-10-CM

## 2024-04-29 DIAGNOSIS — I25.10 ATHEROSCLEROSIS OF CORONARY ARTERY WITHOUT ANGINA PECTORIS, UNSPECIFIED VESSEL OR LESION TYPE, UNSPECIFIED WHETHER NATIVE OR TRANSPLANTED HEART: ICD-10-CM

## 2024-04-29 DIAGNOSIS — I10 ESSENTIAL HYPERTENSION: ICD-10-CM

## 2024-04-29 DIAGNOSIS — E78.5 DYSLIPIDEMIA: ICD-10-CM

## 2024-04-29 DIAGNOSIS — I50.32 CHRONIC DIASTOLIC HEART FAILURE (HCC): ICD-10-CM

## 2024-04-29 LAB
ALBUMIN SERPL-MCNC: 4.2 G/DL (ref 3.5–5.2)
ALBUMIN/GLOB SERPL: 1.7 {RATIO} (ref 1–2.5)
ALP SERPL-CCNC: 74 U/L (ref 40–129)
ALT SERPL-CCNC: 12 U/L (ref 5–41)
ANION GAP SERPL CALCULATED.3IONS-SCNC: 12 MMOL/L (ref 9–17)
AST SERPL-CCNC: 17 U/L
BASOPHILS # BLD: 0.05 K/UL (ref 0–0.2)
BASOPHILS NFR BLD: 1 % (ref 0–2)
BILIRUB SERPL-MCNC: 0.7 MG/DL (ref 0.3–1.2)
BNP SERPL-MCNC: 248 PG/ML
BUN SERPL-MCNC: 15 MG/DL (ref 8–23)
BUN/CREAT SERPL: 19 (ref 9–20)
CALCIUM SERPL-MCNC: 9 MG/DL (ref 8.6–10.4)
CHLORIDE SERPL-SCNC: 103 MMOL/L (ref 98–107)
CHOLEST SERPL-MCNC: 108 MG/DL (ref 0–199)
CHOLESTEROL/HDL RATIO: 3
CO2 SERPL-SCNC: 25 MMOL/L (ref 20–31)
CREAT SERPL-MCNC: 0.8 MG/DL (ref 0.7–1.2)
EOSINOPHIL # BLD: 0.22 K/UL (ref 0–0.44)
EOSINOPHILS RELATIVE PERCENT: 3 % (ref 1–4)
ERYTHROCYTE [DISTWIDTH] IN BLOOD BY AUTOMATED COUNT: 14.2 % (ref 11.8–14.4)
EST. AVERAGE GLUCOSE BLD GHB EST-MCNC: 140 MG/DL
GFR SERPL CREATININE-BSD FRML MDRD: >90 ML/MIN/1.73M2
GLUCOSE SERPL-MCNC: 83 MG/DL (ref 70–99)
HBA1C MFR BLD: 6.5 % (ref 4–6)
HCT VFR BLD AUTO: 37.9 % (ref 40.7–50.3)
HDLC SERPL-MCNC: 43 MG/DL
HGB BLD-MCNC: 12.5 G/DL (ref 13–17)
IMM GRANULOCYTES # BLD AUTO: <0.03 K/UL (ref 0–0.3)
IMM GRANULOCYTES NFR BLD: 0 %
LDLC SERPL CALC-MCNC: 51 MG/DL (ref 0–100)
LYMPHOCYTES NFR BLD: 2.45 K/UL (ref 1.1–3.7)
LYMPHOCYTES RELATIVE PERCENT: 36 % (ref 24–43)
MCH RBC QN AUTO: 29.2 PG (ref 25.2–33.5)
MCHC RBC AUTO-ENTMCNC: 33 G/DL (ref 28.4–34.8)
MCV RBC AUTO: 88.6 FL (ref 82.6–102.9)
MONOCYTES NFR BLD: 0.5 K/UL (ref 0.1–1.2)
MONOCYTES NFR BLD: 7 % (ref 3–12)
NEUTROPHILS NFR BLD: 53 % (ref 36–65)
NEUTS SEG NFR BLD: 3.54 K/UL (ref 1.5–8.1)
NRBC BLD-RTO: 0 PER 100 WBC
PLATELET # BLD AUTO: 199 K/UL (ref 138–453)
PMV BLD AUTO: 9.3 FL (ref 8.1–13.5)
POTASSIUM SERPL-SCNC: 3.6 MMOL/L (ref 3.7–5.3)
PROT SERPL-MCNC: 6.7 G/DL (ref 6.4–8.3)
RBC # BLD AUTO: 4.28 M/UL (ref 4.21–5.77)
SODIUM SERPL-SCNC: 140 MMOL/L (ref 135–144)
TRIGL SERPL-MCNC: 70 MG/DL
VLDLC SERPL CALC-MCNC: 14 MG/DL
WBC OTHER # BLD: 6.8 K/UL (ref 3.5–11.3)

## 2024-04-29 PROCEDURE — 3044F HG A1C LEVEL LT 7.0%: CPT | Performed by: FAMILY MEDICINE

## 2024-04-29 PROCEDURE — 80061 LIPID PANEL: CPT

## 2024-04-29 PROCEDURE — 3078F DIAST BP <80 MM HG: CPT | Performed by: FAMILY MEDICINE

## 2024-04-29 PROCEDURE — 99214 OFFICE O/P EST MOD 30 MIN: CPT | Performed by: FAMILY MEDICINE

## 2024-04-29 PROCEDURE — 83880 ASSAY OF NATRIURETIC PEPTIDE: CPT

## 2024-04-29 PROCEDURE — G2211 COMPLEX E/M VISIT ADD ON: HCPCS | Performed by: FAMILY MEDICINE

## 2024-04-29 PROCEDURE — 36415 COLL VENOUS BLD VENIPUNCTURE: CPT

## 2024-04-29 PROCEDURE — 83036 HEMOGLOBIN GLYCOSYLATED A1C: CPT

## 2024-04-29 PROCEDURE — 85025 COMPLETE CBC W/AUTO DIFF WBC: CPT

## 2024-04-29 PROCEDURE — 1123F ACP DISCUSS/DSCN MKR DOCD: CPT | Performed by: FAMILY MEDICINE

## 2024-04-29 PROCEDURE — 3074F SYST BP LT 130 MM HG: CPT | Performed by: FAMILY MEDICINE

## 2024-04-29 PROCEDURE — 80053 COMPREHEN METABOLIC PANEL: CPT

## 2024-04-29 SDOH — ECONOMIC STABILITY: FOOD INSECURITY: WITHIN THE PAST 12 MONTHS, THE FOOD YOU BOUGHT JUST DIDN'T LAST AND YOU DIDN'T HAVE MONEY TO GET MORE.: NEVER TRUE

## 2024-04-29 SDOH — ECONOMIC STABILITY: FOOD INSECURITY: WITHIN THE PAST 12 MONTHS, YOU WORRIED THAT YOUR FOOD WOULD RUN OUT BEFORE YOU GOT MONEY TO BUY MORE.: NEVER TRUE

## 2024-04-29 SDOH — ECONOMIC STABILITY: INCOME INSECURITY: HOW HARD IS IT FOR YOU TO PAY FOR THE VERY BASICS LIKE FOOD, HOUSING, MEDICAL CARE, AND HEATING?: NOT HARD AT ALL

## 2024-04-29 NOTE — PROGRESS NOTES
[facility]      Patient:  Derick Posadas 74 y.o. male     Date of Service: 8/21/23      Chiefcomplaint:   Chief Complaint   Patient presents with    Congestive Heart Failure     -patient is here for follow CHF.   -patient reports that swelling in legs have gone down   -patient denies any shortness of breath at this time.     Dizziness     -patient has been having severe dizzy spells.   -patient reports that he fell 1 month ago and hit his head . Patient states when he tilts more to the left he gets very dizzy.   -dizziness has been going on for 1 month.          History of Present Illness     73-year-old male in office to follow-up on history of CHF, history of AAA, diabetes.    Reviewed most recent A1c is 6.2 11/2023.  Patient is currently maintained on glipizide 5 mg daily.  Tolerates medications well without side effects or intolerances.  Reports no recent hypoglycemic episodes.  Was trialed on Farxiga however was unable to afford the medication.  No current reports of hyper or hypoglycemic symptoms.  Does not check home blood sugars.    Also notes a chronic history of CHF, last echo from 2018 reviewed.  No current reports of symptoms at this time such as paroxysmal nocturnal dyspnea, orthopnea however does have significant amount of edema in the bilateral lower extremity.  He was previously treated with 80 mg Lasix daily for 1 week and is now back on to 60 mg daily.  Legs are much improved at this time.  Does not check daily weights/dry weight.  He is chronically noncompliant with therapy.    Also notes a previous history of AAA that measured 4.3 cm in 2021.  Denies any symptoms at this time.  CTA chest was previously ordered however patient did not obtain/schedule yet.    Concerns of dizziness ongoing for approximately over 1 month.  Does not recall any specific inciting event such as MVC's, trauma, head injuries, recent LOC.  Occurs mainly when standing up from a seated position and

## 2024-05-17 DIAGNOSIS — E11.9 TYPE 2 DIABETES MELLITUS WITHOUT COMPLICATION, WITHOUT LONG-TERM CURRENT USE OF INSULIN (HCC): ICD-10-CM

## 2024-05-17 RX ORDER — GABAPENTIN 300 MG/1
300 CAPSULE ORAL 3 TIMES DAILY
Qty: 90 CAPSULE | Refills: 0 | Status: SHIPPED | OUTPATIENT
Start: 2024-05-17 | End: 2024-06-16

## 2024-06-10 ENCOUNTER — OFFICE VISIT (OUTPATIENT)
Dept: PRIMARY CARE CLINIC | Age: 74
End: 2024-06-10
Payer: COMMERCIAL

## 2024-06-10 VITALS
WEIGHT: 239.2 LBS | SYSTOLIC BLOOD PRESSURE: 106 MMHG | OXYGEN SATURATION: 96 % | DIASTOLIC BLOOD PRESSURE: 72 MMHG | BODY MASS INDEX: 36.37 KG/M2 | HEART RATE: 76 BPM

## 2024-06-10 DIAGNOSIS — I50.32 CHRONIC DIASTOLIC HEART FAILURE (HCC): ICD-10-CM

## 2024-06-10 DIAGNOSIS — I10 ESSENTIAL HYPERTENSION: ICD-10-CM

## 2024-06-10 DIAGNOSIS — I71.20 THORACIC AORTIC ANEURYSM WITHOUT RUPTURE, UNSPECIFIED PART (HCC): ICD-10-CM

## 2024-06-10 DIAGNOSIS — R42 DIZZINESS: ICD-10-CM

## 2024-06-10 DIAGNOSIS — E11.9 TYPE 2 DIABETES MELLITUS WITHOUT COMPLICATION, WITHOUT LONG-TERM CURRENT USE OF INSULIN (HCC): Primary | ICD-10-CM

## 2024-06-10 PROCEDURE — 1123F ACP DISCUSS/DSCN MKR DOCD: CPT | Performed by: FAMILY MEDICINE

## 2024-06-10 PROCEDURE — 99214 OFFICE O/P EST MOD 30 MIN: CPT | Performed by: FAMILY MEDICINE

## 2024-06-10 PROCEDURE — 3078F DIAST BP <80 MM HG: CPT | Performed by: FAMILY MEDICINE

## 2024-06-10 PROCEDURE — 3074F SYST BP LT 130 MM HG: CPT | Performed by: FAMILY MEDICINE

## 2024-06-10 PROCEDURE — 3044F HG A1C LEVEL LT 7.0%: CPT | Performed by: FAMILY MEDICINE

## 2024-06-10 PROCEDURE — G2211 COMPLEX E/M VISIT ADD ON: HCPCS | Performed by: FAMILY MEDICINE

## 2024-06-10 RX ORDER — NYSTATIN 100000 [USP'U]/G
POWDER TOPICAL
Qty: 60 G | Refills: 2 | Status: SHIPPED | OUTPATIENT
Start: 2024-06-10

## 2024-06-10 NOTE — PATIENT INSTRUCTIONS
Go down to 2 tablets of lasix per day which equals 40mg  Previously you were on 60mg a day  Continue taking fluids and water  See how you feel on the 40mg lasix daily, if you start having leg swelling go back to the 60mg dose which is 3 tablets

## 2024-06-10 NOTE — PROGRESS NOTES
[facility]      Patient:  Derick Posadas 74 y.o. male     Date of Service: 06/10/24        Chiefcomplaint:   Chief Complaint   Patient presents with    New Patient    Dizziness     Patient reports dizziness is better, states he does not feel dizzy as often as before    Diabetes     Reports he is not checking his glucose.  Feels at times his glucose is too priti with symptoms of dizziness.           History of Present Illness     Reviewed most recent A1c is 6.5.   Patient is currently maintained on glipizide 5 mg daily.  Tolerates medications well without side effects or intolerances.  Reports no recent hypoglycemic episodes.  Was trialed on Farxiga however was unable to afford the medication.  No current reports of hyper or hypoglycemic symptoms.  Does not check home blood sugars.    Also notes a chronic history of CHF, last echo from 2018 reviewed.  No current reports of symptoms at this time such as paroxysmal nocturnal dyspnea, orthopnea however does have significant amount of edema in the bilateral lower extremity.     The patient was treated with  mg of Lasix daily for short period of time to treat fluid overload, hypervolemia.  Now notes very significant symptomatic improvement.  has gone back down to 60 mg daily what he was on before.    Does not check daily weights/dry weight.      Dizziness that patient was also previously evaluated for is much improved at this time.  Did not recall any specific inciting event such as MVC's, trauma, head injuries, recent LOC.  Occurs mainly when standing up from a seated position and walking.  No FND.  Allergies:    Sulfa antibiotics and Pcn [penicillins]    Medication List:    Current Outpatient Medications   Medication Sig Dispense Refill    nystatin (MYCOSTATIN) 367966 UNIT/GM powder Apply 3 times daily. 60 g 2    gabapentin (NEURONTIN) 300 MG capsule Take 1 capsule by mouth 3 times daily for 30 days. 90 capsule 0    busPIRone (BUSPAR) 15

## 2024-06-20 ENCOUNTER — TELEPHONE (OUTPATIENT)
Dept: PRIMARY CARE CLINIC | Age: 74
End: 2024-06-20

## 2024-06-20 NOTE — TELEPHONE ENCOUNTER
Patient called stating he is having a hard time with his stairs due to neuropathy and arthritis in his legs. Patient wondering if he can get a letter stating they need a handicap accessible apartment. I told him he may need an appt to discuss. Please advise.

## 2024-07-01 DIAGNOSIS — E11.9 TYPE 2 DIABETES MELLITUS WITHOUT COMPLICATION, WITHOUT LONG-TERM CURRENT USE OF INSULIN (HCC): ICD-10-CM

## 2024-07-01 RX ORDER — GLIPIZIDE 5 MG/1
TABLET ORAL
Qty: 90 TABLET | Refills: 0 | Status: SHIPPED | OUTPATIENT
Start: 2024-07-01

## 2024-07-09 DIAGNOSIS — E11.9 TYPE 2 DIABETES MELLITUS WITHOUT COMPLICATION, WITHOUT LONG-TERM CURRENT USE OF INSULIN (HCC): ICD-10-CM

## 2024-07-09 RX ORDER — GABAPENTIN 300 MG/1
300 CAPSULE ORAL 3 TIMES DAILY
Qty: 90 CAPSULE | Refills: 0 | Status: SHIPPED | OUTPATIENT
Start: 2024-07-09 | End: 2024-08-08

## 2024-07-15 DIAGNOSIS — E11.9 TYPE 2 DIABETES MELLITUS WITHOUT COMPLICATION, WITHOUT LONG-TERM CURRENT USE OF INSULIN (HCC): ICD-10-CM

## 2024-07-15 RX ORDER — GLIPIZIDE 5 MG/1
TABLET ORAL
Qty: 90 TABLET | Refills: 0 | Status: SHIPPED | OUTPATIENT
Start: 2024-07-15

## 2024-07-25 RX ORDER — BUSPIRONE HYDROCHLORIDE 15 MG/1
15 TABLET ORAL DAILY
Qty: 90 TABLET | Refills: 1 | Status: SHIPPED | OUTPATIENT
Start: 2024-07-25

## 2024-08-26 DIAGNOSIS — E11.9 TYPE 2 DIABETES MELLITUS WITHOUT COMPLICATION, WITHOUT LONG-TERM CURRENT USE OF INSULIN (HCC): ICD-10-CM

## 2024-08-26 RX ORDER — GABAPENTIN 300 MG/1
300 CAPSULE ORAL 3 TIMES DAILY
Qty: 90 CAPSULE | Refills: 0 | Status: SHIPPED | OUTPATIENT
Start: 2024-08-26 | End: 2024-09-25

## 2024-08-26 NOTE — TELEPHONE ENCOUNTER
Patient wife called stating patient is almost out of his gabapentin. Order pended and pharmacy verified.

## 2024-10-02 DIAGNOSIS — E11.9 TYPE 2 DIABETES MELLITUS WITHOUT COMPLICATION, WITHOUT LONG-TERM CURRENT USE OF INSULIN (HCC): ICD-10-CM

## 2024-10-02 RX ORDER — GLIPIZIDE 5 MG/1
TABLET ORAL
Qty: 90 TABLET | Refills: 0 | Status: SHIPPED | OUTPATIENT
Start: 2024-10-02

## 2024-10-02 RX ORDER — BENAZEPRIL HYDROCHLORIDE 5 MG/1
5 TABLET ORAL DAILY
Qty: 90 TABLET | Refills: 1 | Status: SHIPPED | OUTPATIENT
Start: 2024-10-02

## 2024-10-09 ENCOUNTER — OFFICE VISIT (OUTPATIENT)
Dept: PRIMARY CARE CLINIC | Age: 74
End: 2024-10-09

## 2024-10-09 VITALS
BODY MASS INDEX: 36.37 KG/M2 | HEART RATE: 79 BPM | HEIGHT: 68 IN | SYSTOLIC BLOOD PRESSURE: 102 MMHG | WEIGHT: 240 LBS | OXYGEN SATURATION: 96 % | DIASTOLIC BLOOD PRESSURE: 76 MMHG

## 2024-10-09 DIAGNOSIS — I50.32 CHRONIC DIASTOLIC HEART FAILURE (HCC): ICD-10-CM

## 2024-10-09 DIAGNOSIS — I10 ESSENTIAL HYPERTENSION: ICD-10-CM

## 2024-10-09 DIAGNOSIS — Z00.00 MEDICARE ANNUAL WELLNESS VISIT, SUBSEQUENT: Primary | ICD-10-CM

## 2024-10-09 DIAGNOSIS — E78.5 DYSLIPIDEMIA: ICD-10-CM

## 2024-10-09 DIAGNOSIS — Z23 NEED FOR VACCINATION: ICD-10-CM

## 2024-10-09 DIAGNOSIS — E11.40 TYPE 2 DIABETES MELLITUS WITH DIABETIC NEUROPATHY, WITHOUT LONG-TERM CURRENT USE OF INSULIN (HCC): ICD-10-CM

## 2024-10-09 ASSESSMENT — PATIENT HEALTH QUESTIONNAIRE - PHQ9
7. TROUBLE CONCENTRATING ON THINGS, SUCH AS READING THE NEWSPAPER OR WATCHING TELEVISION: NOT AT ALL
2. FEELING DOWN, DEPRESSED OR HOPELESS: NOT AT ALL
SUM OF ALL RESPONSES TO PHQ QUESTIONS 1-9: 0
6. FEELING BAD ABOUT YOURSELF - OR THAT YOU ARE A FAILURE OR HAVE LET YOURSELF OR YOUR FAMILY DOWN: NOT AT ALL
SUM OF ALL RESPONSES TO PHQ QUESTIONS 1-9: 0
9. THOUGHTS THAT YOU WOULD BE BETTER OFF DEAD, OR OF HURTING YOURSELF: NOT AT ALL
1. LITTLE INTEREST OR PLEASURE IN DOING THINGS: NOT AT ALL
1. LITTLE INTEREST OR PLEASURE IN DOING THINGS: NOT AT ALL
3. TROUBLE FALLING OR STAYING ASLEEP: NOT AT ALL
4. FEELING TIRED OR HAVING LITTLE ENERGY: NOT AT ALL
8. MOVING OR SPEAKING SO SLOWLY THAT OTHER PEOPLE COULD HAVE NOTICED. OR THE OPPOSITE, BEING SO FIGETY OR RESTLESS THAT YOU HAVE BEEN MOVING AROUND A LOT MORE THAN USUAL: NOT AT ALL
SUM OF ALL RESPONSES TO PHQ9 QUESTIONS 1 & 2: 0
2. FEELING DOWN, DEPRESSED OR HOPELESS: NOT AT ALL
SUM OF ALL RESPONSES TO PHQ9 QUESTIONS 1 & 2: 0
SUM OF ALL RESPONSES TO PHQ QUESTIONS 1-9: 0
5. POOR APPETITE OR OVEREATING: NOT AT ALL
10. IF YOU CHECKED OFF ANY PROBLEMS, HOW DIFFICULT HAVE THESE PROBLEMS MADE IT FOR YOU TO DO YOUR WORK, TAKE CARE OF THINGS AT HOME, OR GET ALONG WITH OTHER PEOPLE: NOT DIFFICULT AT ALL
SUM OF ALL RESPONSES TO PHQ QUESTIONS 1-9: 0
SUM OF ALL RESPONSES TO PHQ QUESTIONS 1-9: 0

## 2024-10-09 ASSESSMENT — LIFESTYLE VARIABLES
HOW MANY STANDARD DRINKS CONTAINING ALCOHOL DO YOU HAVE ON A TYPICAL DAY: PATIENT DOES NOT DRINK
HOW OFTEN DO YOU HAVE A DRINK CONTAINING ALCOHOL: NEVER

## 2024-10-09 NOTE — PROGRESS NOTES
factors.         Return in about 3 months (around 1/9/2025) for HTN, HLD, Diabetes.     Subjective   The following acute and/or chronic problems were also addressed today:    Htn  Hld  T2dm  Chf    Patient's complete Health Risk Assessment and screening values have been reviewed and are found in Flowsheets. The following problems were reviewed today and where indicated follow up appointments were made and/or referrals ordered.    Positive Risk Factor Screenings with Interventions:       Cognitive:   Clock Drawing Test (CDT): (S) (!) Abnormal  Words recalled: 1 Word Recalled  Total Score: (!) 1  Total Score Interpretation: Abnormal Mini-Cog  Interventions:  Patient advised to follow-up in this office for further evaluation and treatment  See AVS for additional education material  See A/P for plan and any pertinent orders            Inactivity:  On average, how many days per week do you engage in moderate to strenuous exercise (like a brisk walk)?: 2 days (!) Abnormal  On average, how many minutes do you engage in exercise at this level?: 10 min  Interventions:  Patient advised to follow up in the office for further evaluation and treatment  See AVS for additional education material  See A/P for plan and any pertinent orders     Abnormal BMI (obese):  Body mass index is 36.49 kg/m². (!) Abnormal  Interventions:  exercise for at least 150 minutes/week  See AVS for additional education material  See A/P for plan and any pertinent orders           Safety:  Do you have any tripping hazards - loose or unsecured carpets or rugs?: (!) Yes  Interventions:  See AVS for additional education material  See A/P for plan and any pertinent orders     Advanced Directives:  Do you have a Living Will?: (!) No    Intervention:  As above                     Objective   Vitals:    10/09/24 1354   BP: 102/76   Pulse: 79   SpO2: 96%   Weight: 108.9 kg (240 lb)   Height: 1.727 m (5' 8\")      Body mass index is 36.49 kg/m².

## 2024-10-14 RX ORDER — ATORVASTATIN CALCIUM 40 MG/1
40 TABLET, FILM COATED ORAL DAILY
Qty: 90 TABLET | Refills: 1 | Status: SHIPPED | OUTPATIENT
Start: 2024-10-14

## 2024-10-17 DIAGNOSIS — E11.9 TYPE 2 DIABETES MELLITUS WITHOUT COMPLICATION, WITHOUT LONG-TERM CURRENT USE OF INSULIN (HCC): ICD-10-CM

## 2024-10-17 RX ORDER — BENAZEPRIL HYDROCHLORIDE 5 MG/1
5 TABLET ORAL DAILY
Qty: 90 TABLET | Refills: 1 | Status: CANCELLED | OUTPATIENT
Start: 2024-10-17

## 2024-10-17 RX ORDER — GLIPIZIDE 5 MG/1
TABLET ORAL
Qty: 90 TABLET | Refills: 0 | Status: CANCELLED | OUTPATIENT
Start: 2024-10-17

## 2024-10-17 NOTE — TELEPHONE ENCOUNTER
Patient wife called requesting refills on benazepril and glipizide. Medications already sent into pharmacy 10/2/2024.

## 2024-10-22 DIAGNOSIS — E11.9 TYPE 2 DIABETES MELLITUS WITHOUT COMPLICATION, WITHOUT LONG-TERM CURRENT USE OF INSULIN (HCC): ICD-10-CM

## 2024-10-22 RX ORDER — BENAZEPRIL HYDROCHLORIDE 5 MG/1
5 TABLET ORAL DAILY
Qty: 90 TABLET | Refills: 1 | Status: SHIPPED | OUTPATIENT
Start: 2024-10-22

## 2024-10-22 RX ORDER — GABAPENTIN 300 MG/1
300 CAPSULE ORAL 3 TIMES DAILY
Qty: 90 CAPSULE | Refills: 0 | Status: SHIPPED | OUTPATIENT
Start: 2024-10-22 | End: 2024-11-21

## 2024-10-22 RX ORDER — ATORVASTATIN CALCIUM 40 MG/1
40 TABLET, FILM COATED ORAL DAILY
Qty: 90 TABLET | Refills: 1 | Status: SHIPPED | OUTPATIENT
Start: 2024-10-22

## 2024-10-22 RX ORDER — GLIPIZIDE 5 MG/1
TABLET ORAL
Qty: 90 TABLET | Refills: 0 | Status: SHIPPED | OUTPATIENT
Start: 2024-10-22

## 2024-10-22 NOTE — TELEPHONE ENCOUNTER
Wife called and stated [facility] pharmacy has no script for Posadas Lipitor, lotensin, or glipizide. All of which were sent in earlier this month but pharmacy states they did not receive them. Wife also called in a refill on his Gabapentin. All orders are pended and pharmacy is verified.

## 2024-11-18 DIAGNOSIS — I50.32 CHRONIC DIASTOLIC HEART FAILURE (HCC): ICD-10-CM

## 2024-11-18 RX ORDER — FUROSEMIDE 20 MG/1
60 TABLET ORAL DAILY
Qty: 270 TABLET | Refills: 1 | Status: SHIPPED | OUTPATIENT
Start: 2024-11-18 | End: 2025-05-17

## 2024-11-18 RX ORDER — POTASSIUM CHLORIDE 1500 MG/1
20 TABLET, EXTENDED RELEASE ORAL 3 TIMES DAILY
Qty: 270 TABLET | Refills: 1 | Status: SHIPPED | OUTPATIENT
Start: 2024-11-18

## 2024-12-04 DIAGNOSIS — E11.9 TYPE 2 DIABETES MELLITUS WITHOUT COMPLICATION, WITHOUT LONG-TERM CURRENT USE OF INSULIN (HCC): ICD-10-CM

## 2024-12-04 RX ORDER — GABAPENTIN 300 MG/1
300 CAPSULE ORAL 3 TIMES DAILY
Qty: 270 CAPSULE | Refills: 0 | Status: SHIPPED | OUTPATIENT
Start: 2024-12-04 | End: 2025-03-04

## 2025-01-09 ENCOUNTER — OFFICE VISIT (OUTPATIENT)
Dept: PRIMARY CARE CLINIC | Age: 75
End: 2025-01-09

## 2025-01-09 VITALS
DIASTOLIC BLOOD PRESSURE: 76 MMHG | BODY MASS INDEX: 36.8 KG/M2 | OXYGEN SATURATION: 96 % | WEIGHT: 242 LBS | SYSTOLIC BLOOD PRESSURE: 118 MMHG | HEART RATE: 78 BPM

## 2025-01-09 DIAGNOSIS — I10 ESSENTIAL HYPERTENSION: ICD-10-CM

## 2025-01-09 DIAGNOSIS — E11.9 TYPE 2 DIABETES MELLITUS WITHOUT COMPLICATION, WITHOUT LONG-TERM CURRENT USE OF INSULIN (HCC): Primary | ICD-10-CM

## 2025-01-09 DIAGNOSIS — I50.32 CHRONIC DIASTOLIC HEART FAILURE (HCC): ICD-10-CM

## 2025-01-09 DIAGNOSIS — E78.5 DYSLIPIDEMIA: ICD-10-CM

## 2025-01-09 SDOH — ECONOMIC STABILITY: FOOD INSECURITY: WITHIN THE PAST 12 MONTHS, THE FOOD YOU BOUGHT JUST DIDN'T LAST AND YOU DIDN'T HAVE MONEY TO GET MORE.: NEVER TRUE

## 2025-01-09 SDOH — ECONOMIC STABILITY: FOOD INSECURITY: WITHIN THE PAST 12 MONTHS, YOU WORRIED THAT YOUR FOOD WOULD RUN OUT BEFORE YOU GOT MONEY TO BUY MORE.: NEVER TRUE

## 2025-01-09 ASSESSMENT — PATIENT HEALTH QUESTIONNAIRE - PHQ9
5. POOR APPETITE OR OVEREATING: NOT AT ALL
SUM OF ALL RESPONSES TO PHQ QUESTIONS 1-9: 0
9. THOUGHTS THAT YOU WOULD BE BETTER OFF DEAD, OR OF HURTING YOURSELF: NOT AT ALL
SUM OF ALL RESPONSES TO PHQ QUESTIONS 1-9: 0
3. TROUBLE FALLING OR STAYING ASLEEP: NOT AT ALL
1. LITTLE INTEREST OR PLEASURE IN DOING THINGS: NOT AT ALL
SUM OF ALL RESPONSES TO PHQ QUESTIONS 1-9: 0
6. FEELING BAD ABOUT YOURSELF - OR THAT YOU ARE A FAILURE OR HAVE LET YOURSELF OR YOUR FAMILY DOWN: NOT AT ALL
8. MOVING OR SPEAKING SO SLOWLY THAT OTHER PEOPLE COULD HAVE NOTICED. OR THE OPPOSITE, BEING SO FIGETY OR RESTLESS THAT YOU HAVE BEEN MOVING AROUND A LOT MORE THAN USUAL: NOT AT ALL
2. FEELING DOWN, DEPRESSED OR HOPELESS: NOT AT ALL
SUM OF ALL RESPONSES TO PHQ9 QUESTIONS 1 & 2: 0
7. TROUBLE CONCENTRATING ON THINGS, SUCH AS READING THE NEWSPAPER OR WATCHING TELEVISION: NOT AT ALL
10. IF YOU CHECKED OFF ANY PROBLEMS, HOW DIFFICULT HAVE THESE PROBLEMS MADE IT FOR YOU TO DO YOUR WORK, TAKE CARE OF THINGS AT HOME, OR GET ALONG WITH OTHER PEOPLE: NOT DIFFICULT AT ALL
SUM OF ALL RESPONSES TO PHQ QUESTIONS 1-9: 0
4. FEELING TIRED OR HAVING LITTLE ENERGY: NOT AT ALL

## 2025-01-09 NOTE — PROGRESS NOTES
[facility]      Patient:  Derick Posadas 74 y.o. male     Date of Service: 01/09/25        Chiefcomplaint:   Chief Complaint   Patient presents with    Hypertension     No not  monitoring blood pressure at home.  Most of the time following a well balanced diet. Somewhat following a low sodium diet. For physical activity notes-walking. Denies chest pain, shortness of breath, headache, vision changes, palpitations, light headedness, or dizziness.       Diabetes     here for routine follow up of diabetes.    average blood glucose  No not checking blood glucose levels regularly. Needs new meter   Denies numbness and tingling in the hands and feet.    compliant with diet and exercise.   compliant with medications.    tolerating medications.          History of Present Illness     Reviewed most recent A1c is 6.5.   Patient is currently maintained on glipizide 5 mg daily.  Tolerates medications well without side effects or intolerances.  Reports no recent hypoglycemic episodes.  Was trialed on Farxiga however was unable to afford the medication.  No current reports of hyper or hypoglycemic symptoms.  Does not check home blood sugars.    Also notes a chronic history of CHF, last echo from 2018 reviewed.  No current reports of symptoms at this time such as paroxysmal nocturnal dyspnea, orthopnea.  He has some baseline edema in the bilateral lower extremities.  Much improved after increasing his dose of Lasix to 60 mg daily.    Does not check daily weights/dry weight.      Allergies:    Sulfa antibiotics and Pcn [penicillins]    Medication List:    Current Outpatient Medications   Medication Sig Dispense Refill    gabapentin (NEURONTIN) 300 MG capsule Take 1 capsule by mouth 3 times daily for 90 days. 270 capsule 0    furosemide (LASIX) 20 MG tablet Take 3 tablets by mouth daily 270 tablet 1    potassium chloride (K-TAB) 20 MEQ TBCR extended release tablet Take 1 tablet by mouth 3 times daily 270

## 2025-01-16 ENCOUNTER — OFFICE VISIT (OUTPATIENT)
Dept: CARDIOLOGY | Age: 75
End: 2025-01-16
Payer: COMMERCIAL

## 2025-01-16 VITALS
OXYGEN SATURATION: 96 % | HEIGHT: 68 IN | SYSTOLIC BLOOD PRESSURE: 110 MMHG | WEIGHT: 243.8 LBS | DIASTOLIC BLOOD PRESSURE: 74 MMHG | RESPIRATION RATE: 18 BRPM | BODY MASS INDEX: 36.95 KG/M2 | HEART RATE: 74 BPM

## 2025-01-16 DIAGNOSIS — G47.33 OSA (OBSTRUCTIVE SLEEP APNEA): ICD-10-CM

## 2025-01-16 DIAGNOSIS — I34.0 NONRHEUMATIC MITRAL VALVE REGURGITATION: ICD-10-CM

## 2025-01-16 DIAGNOSIS — E11.40 TYPE 2 DIABETES MELLITUS WITH DIABETIC NEUROPATHY, WITHOUT LONG-TERM CURRENT USE OF INSULIN (HCC): ICD-10-CM

## 2025-01-16 DIAGNOSIS — E11.9 TYPE 2 DIABETES MELLITUS WITHOUT COMPLICATION, WITHOUT LONG-TERM CURRENT USE OF INSULIN (HCC): ICD-10-CM

## 2025-01-16 DIAGNOSIS — I50.32 CHRONIC DIASTOLIC HEART FAILURE (HCC): Primary | ICD-10-CM

## 2025-01-16 DIAGNOSIS — E78.5 DYSLIPIDEMIA: ICD-10-CM

## 2025-01-16 PROCEDURE — 99214 OFFICE O/P EST MOD 30 MIN: CPT | Performed by: INTERNAL MEDICINE

## 2025-01-16 PROCEDURE — 3078F DIAST BP <80 MM HG: CPT | Performed by: INTERNAL MEDICINE

## 2025-01-16 PROCEDURE — 1123F ACP DISCUSS/DSCN MKR DOCD: CPT | Performed by: INTERNAL MEDICINE

## 2025-01-16 PROCEDURE — 3074F SYST BP LT 130 MM HG: CPT | Performed by: INTERNAL MEDICINE

## 2025-01-16 PROCEDURE — 93000 ELECTROCARDIOGRAM COMPLETE: CPT | Performed by: INTERNAL MEDICINE

## 2025-01-16 PROCEDURE — 1159F MED LIST DOCD IN RCRD: CPT | Performed by: INTERNAL MEDICINE

## 2025-01-16 RX ORDER — DAPAGLIFLOZIN 10 MG/1
10 TABLET, FILM COATED ORAL EVERY MORNING
Qty: 90 TABLET | Refills: 0 | Status: SHIPPED | OUTPATIENT
Start: 2025-01-16

## 2025-01-16 NOTE — PROGRESS NOTES
2022    EYE TRABECULAR MICRO BYPASS performed by Navneet Baumann DO at [facility] OR    INTRACAPSULAR CATARACT EXTRACTION Left 2022    EYE CATARACT EMULSIFICATION IOL IMPLANT performed by Navneet Baumann DO at [facility] OR    INTRACAPSULAR CATARACT EXTRACTION Right 2022    EYE CATARACT EMULSIFICATION IOL IMPLANT-I STENT performed by Navneet Baumann DO at [facility] OR    TOOTH EXTRACTION      yrs ago    UPPER GASTROINTESTINAL ENDOSCOPY N/A 2018    EGD BIOPSY performed by Derick Galleog MD at [facility] OR    UPPER GASTROINTESTINAL ENDOSCOPY  2018    EGD DILATION SAVORY performed by Derick Gallego MD at [facility] OR    UPPER GASTROINTESTINAL ENDOSCOPY  2018    EGD POLYP SNARE performed by Derick Gallego MD at [facility] OR    UPPER GASTROINTESTINAL ENDOSCOPY  2018    -bx,dilation,gastric polyp     Social History:    Social History     Tobacco Use   Smoking Status Former    Current packs/day: 0.00    Types: Cigarettes    Quit date: 1969    Years since quittin.0   Smokeless Tobacco Never   Tobacco Comments    on patches     Current Medications:  Outpatient Medications Marked as Taking for the 25 encounter (Office Visit) with Brady Subramanian MD   Medication Sig Dispense Refill    gabapentin (NEURONTIN) 300 MG capsule Take 1 capsule by mouth 3 times daily for 90 days. 270 capsule 0    furosemide (LASIX) 20 MG tablet Take 3 tablets by mouth daily 270 tablet 1    potassium chloride (K-TAB) 20 MEQ TBCR extended release tablet Take 1 tablet by mouth 3 times daily 270 tablet 1    glipiZIDE (GLUCOTROL) 5 MG tablet Take 1 tablet by mouth once daily with meal 90 tablet 0    benazepril (LOTENSIN) 5 MG tablet Take 1 tablet by mouth daily 90 tablet 1    atorvastatin (LIPITOR) 40 MG tablet Take 1 tablet by mouth daily 90 tablet 1    busPIRone (BUSPAR) 15 MG tablet Take 15 mg by mouth daily 90 tablet 1    nystatin (MYCOSTATIN) 466966 UNIT/GM powder Apply 3 times daily. 60 g 2    blood glucose monitor strips

## 2025-01-31 ENCOUNTER — HOSPITAL ENCOUNTER (OUTPATIENT)
Age: 75
Discharge: HOME | End: 2025-01-31
Payer: COMMERCIAL

## 2025-01-31 DIAGNOSIS — I50.32 CHRONIC DIASTOLIC HEART FAILURE (HCC): ICD-10-CM

## 2025-01-31 DIAGNOSIS — E78.5 DYSLIPIDEMIA: ICD-10-CM

## 2025-01-31 DIAGNOSIS — E11.9 TYPE 2 DIABETES MELLITUS WITHOUT COMPLICATION, WITHOUT LONG-TERM CURRENT USE OF INSULIN (HCC): ICD-10-CM

## 2025-01-31 DIAGNOSIS — I34.0 NONRHEUMATIC MITRAL VALVE REGURGITATION: ICD-10-CM

## 2025-01-31 DIAGNOSIS — E11.40 TYPE 2 DIABETES MELLITUS WITH DIABETIC NEUROPATHY, WITHOUT LONG-TERM CURRENT USE OF INSULIN (HCC): ICD-10-CM

## 2025-01-31 DIAGNOSIS — I10 ESSENTIAL HYPERTENSION: ICD-10-CM

## 2025-01-31 DIAGNOSIS — G47.33 OSA (OBSTRUCTIVE SLEEP APNEA): ICD-10-CM

## 2025-01-31 LAB
ALBUMIN SERPL-MCNC: 4.2 G/DL (ref 3.5–5.2)
ALBUMIN/GLOB SERPL: 1.5 {RATIO} (ref 1–2.5)
ALP SERPL-CCNC: 78 U/L (ref 40–129)
ALT SERPL-CCNC: 17 U/L (ref 10–50)
ANION GAP SERPL CALCULATED.3IONS-SCNC: 11 MMOL/L (ref 9–16)
AST SERPL-CCNC: 22 U/L (ref 10–50)
BASOPHILS # BLD: 0.08 K/UL (ref 0–0.2)
BASOPHILS NFR BLD: 1 % (ref 0–2)
BILIRUB SERPL-MCNC: 0.9 MG/DL (ref 0–1.2)
BNP SERPL-MCNC: 220 PG/ML (ref 0–450)
BUN SERPL-MCNC: 12 MG/DL (ref 8–23)
BUN/CREAT SERPL: 15 (ref 9–20)
CALCIUM SERPL-MCNC: 9 MG/DL (ref 8.6–10.4)
CHLORIDE SERPL-SCNC: 106 MMOL/L (ref 98–107)
CHOLEST SERPL-MCNC: 118 MG/DL (ref 0–199)
CHOLESTEROL/HDL RATIO: 3
CO2 SERPL-SCNC: 25 MMOL/L (ref 20–31)
CREAT SERPL-MCNC: 0.8 MG/DL (ref 0.7–1.2)
EOSINOPHIL # BLD: 0.43 K/UL (ref 0–0.44)
EOSINOPHILS RELATIVE PERCENT: 6 % (ref 1–4)
ERYTHROCYTE [DISTWIDTH] IN BLOOD BY AUTOMATED COUNT: 13.3 % (ref 11.8–14.4)
GFR, ESTIMATED: >90 ML/MIN/1.73M2
GLUCOSE SERPL-MCNC: 132 MG/DL (ref 74–99)
HCT VFR BLD AUTO: 40.9 % (ref 40.7–50.3)
HDLC SERPL-MCNC: 40 MG/DL
HGB BLD-MCNC: 13.8 G/DL (ref 13–17)
IMM GRANULOCYTES # BLD AUTO: <0.03 K/UL (ref 0–0.3)
IMM GRANULOCYTES NFR BLD: 0 %
LDLC SERPL CALC-MCNC: 57 MG/DL (ref 0–100)
LYMPHOCYTES NFR BLD: 2.82 K/UL (ref 1.1–3.7)
LYMPHOCYTES RELATIVE PERCENT: 41 % (ref 24–43)
MCH RBC QN AUTO: 29.5 PG (ref 25.2–33.5)
MCHC RBC AUTO-ENTMCNC: 33.7 G/DL (ref 28.4–34.8)
MCV RBC AUTO: 87.4 FL (ref 82.6–102.9)
MONOCYTES NFR BLD: 0.4 K/UL (ref 0.1–1.2)
MONOCYTES NFR BLD: 6 % (ref 3–12)
NEUTROPHILS NFR BLD: 46 % (ref 36–65)
NEUTS SEG NFR BLD: 3.08 K/UL (ref 1.5–8.1)
NRBC BLD-RTO: 0 PER 100 WBC
PLATELET # BLD AUTO: 225 K/UL (ref 138–453)
PMV BLD AUTO: 9.5 FL (ref 8.1–13.5)
POTASSIUM SERPL-SCNC: 4.4 MMOL/L (ref 3.7–5.3)
PROT SERPL-MCNC: 7.1 G/DL (ref 6.6–8.7)
RBC # BLD AUTO: 4.68 M/UL (ref 4.21–5.77)
SODIUM SERPL-SCNC: 142 MMOL/L (ref 136–145)
TRIGL SERPL-MCNC: 106 MG/DL
VLDLC SERPL CALC-MCNC: 21 MG/DL (ref 1–30)
WBC OTHER # BLD: 6.7 K/UL (ref 3.5–11.3)

## 2025-01-31 PROCEDURE — 83880 ASSAY OF NATRIURETIC PEPTIDE: CPT

## 2025-01-31 PROCEDURE — 36415 COLL VENOUS BLD VENIPUNCTURE: CPT

## 2025-01-31 PROCEDURE — 80053 COMPREHEN METABOLIC PANEL: CPT

## 2025-01-31 PROCEDURE — 83036 HEMOGLOBIN GLYCOSYLATED A1C: CPT

## 2025-01-31 PROCEDURE — 85025 COMPLETE CBC W/AUTO DIFF WBC: CPT

## 2025-01-31 PROCEDURE — 80061 LIPID PANEL: CPT

## 2025-02-01 LAB
EST. AVERAGE GLUCOSE BLD GHB EST-MCNC: 143 MG/DL
HBA1C MFR BLD: 6.6 % (ref 4–6)

## 2025-02-03 ENCOUNTER — TELEPHONE (OUTPATIENT)
Dept: CARDIOLOGY | Age: 75
End: 2025-02-03

## 2025-02-03 NOTE — TELEPHONE ENCOUNTER
----- Message from Alaina Rodas PA-C sent at 2/3/2025  9:32 AM EST -----  Please notify patient that their lab results are stable.  Please continue current treatment and follow up.

## 2025-02-19 ENCOUNTER — TELEPHONE (OUTPATIENT)
Dept: CARDIOLOGY | Age: 75
End: 2025-02-19

## 2025-02-20 ENCOUNTER — OFFICE VISIT (OUTPATIENT)
Dept: PRIMARY CARE CLINIC | Age: 75
End: 2025-02-20
Payer: COMMERCIAL

## 2025-02-20 VITALS
OXYGEN SATURATION: 96 % | BODY MASS INDEX: 36.83 KG/M2 | SYSTOLIC BLOOD PRESSURE: 126 MMHG | DIASTOLIC BLOOD PRESSURE: 70 MMHG | WEIGHT: 243 LBS | HEART RATE: 70 BPM | HEIGHT: 68 IN

## 2025-02-20 DIAGNOSIS — E11.9 TYPE 2 DIABETES MELLITUS WITHOUT COMPLICATION, WITHOUT LONG-TERM CURRENT USE OF INSULIN (HCC): ICD-10-CM

## 2025-02-20 DIAGNOSIS — E78.5 DYSLIPIDEMIA: ICD-10-CM

## 2025-02-20 DIAGNOSIS — I50.32 CHRONIC DIASTOLIC HEART FAILURE (HCC): Primary | ICD-10-CM

## 2025-02-20 DIAGNOSIS — K21.9 GASTROESOPHAGEAL REFLUX DISEASE WITHOUT ESOPHAGITIS: ICD-10-CM

## 2025-02-20 DIAGNOSIS — F41.9 ANXIETY: ICD-10-CM

## 2025-02-20 DIAGNOSIS — I10 ESSENTIAL HYPERTENSION: ICD-10-CM

## 2025-02-20 PROCEDURE — 3044F HG A1C LEVEL LT 7.0%: CPT | Performed by: FAMILY MEDICINE

## 2025-02-20 PROCEDURE — 99214 OFFICE O/P EST MOD 30 MIN: CPT | Performed by: FAMILY MEDICINE

## 2025-02-20 PROCEDURE — G2211 COMPLEX E/M VISIT ADD ON: HCPCS | Performed by: FAMILY MEDICINE

## 2025-02-20 PROCEDURE — 1123F ACP DISCUSS/DSCN MKR DOCD: CPT | Performed by: FAMILY MEDICINE

## 2025-02-20 PROCEDURE — 3074F SYST BP LT 130 MM HG: CPT | Performed by: FAMILY MEDICINE

## 2025-02-20 PROCEDURE — 1159F MED LIST DOCD IN RCRD: CPT | Performed by: FAMILY MEDICINE

## 2025-02-20 PROCEDURE — 3078F DIAST BP <80 MM HG: CPT | Performed by: FAMILY MEDICINE

## 2025-02-20 RX ORDER — BUSPIRONE HYDROCHLORIDE 15 MG/1
15 TABLET ORAL DAILY
Qty: 90 TABLET | Refills: 1 | Status: SHIPPED | OUTPATIENT
Start: 2025-02-20

## 2025-02-20 RX ORDER — ATORVASTATIN CALCIUM 40 MG/1
40 TABLET, FILM COATED ORAL DAILY
Qty: 90 TABLET | Refills: 1 | Status: SHIPPED | OUTPATIENT
Start: 2025-02-20

## 2025-02-20 RX ORDER — FUROSEMIDE 20 MG/1
60 TABLET ORAL DAILY
Qty: 270 TABLET | Refills: 1 | Status: SHIPPED | OUTPATIENT
Start: 2025-02-20 | End: 2025-08-19

## 2025-02-20 RX ORDER — PANTOPRAZOLE SODIUM 40 MG/1
40 TABLET, DELAYED RELEASE ORAL
Qty: 30 TABLET | Refills: 5 | Status: SHIPPED | OUTPATIENT
Start: 2025-02-20

## 2025-02-20 RX ORDER — BENAZEPRIL HYDROCHLORIDE 5 MG/1
5 TABLET ORAL DAILY
Qty: 90 TABLET | Refills: 1 | Status: SHIPPED | OUTPATIENT
Start: 2025-02-20

## 2025-02-20 RX ORDER — GLIPIZIDE 5 MG/1
TABLET ORAL
Qty: 90 TABLET | Refills: 1 | Status: SHIPPED | OUTPATIENT
Start: 2025-02-20

## 2025-02-20 NOTE — PROGRESS NOTES
[facility]      Patient:  Derick Posadas 74 y.o. male     Date of Service: 02/20/25        Chiefcomplaint:   Chief Complaint   Patient presents with    Follow-up    Diabetes    Congestive Heart Failure         History of Present Illness     Reviewed most recent A1c is 6.6   Patient is currently maintained on glipizide 5 mg daily.  Tolerates medications well without side effects or intolerances.  Reports no recent hypoglycemic episodes.  Has been placed back on Farxiga however not currently taking the medication.  No current hyper/hypoglycemic symptoms.    Also notes a chronic history of CHF, last echo from 2018 reviewed.  No current reports of symptoms at this time such as paroxysmal nocturnal dyspnea, orthopnea.  He has some baseline edema in the bilateral lower extremities.  Much improved after increasing his dose of Lasix to 60 mg daily.    Does not check daily weights/dry weight.      Allergies:    Sulfa antibiotics and Pcn [penicillins]    Medication List:    Current Outpatient Medications   Medication Sig Dispense Refill    dapagliflozin (FARXIGA) 10 MG tablet Take 1 tablet by mouth every morning 90 tablet 0    gabapentin (NEURONTIN) 300 MG capsule Take 1 capsule by mouth 3 times daily for 90 days. 270 capsule 0    potassium chloride (K-TAB) 20 MEQ TBCR extended release tablet Take 1 tablet by mouth 3 times daily 270 tablet 1    blood glucose monitor strips Test 1 times a day 100 strip 2    latanoprost (XALATAN) 0.005 % ophthalmic solution INSTILL 1 DROP INTO RIGHT EYE AT BEDTIME AS DIRECTED      Lancets Misc. MISC 1 applicator by Does not apply route daily 100 each 2    Blood Glucose Monitoring Suppl w/Device KIT 1 kit by Does not apply route Daily Please check blood glucose levels daily. 1 kit 0    glucose monitoring (FREESTYLE FREEDOM) kit 1 kit by Does not apply route daily 1 kit 0    ascorbic acid (VITAMIN C) 500 MG tablet Take 1 tablet by mouth daily 3 in am and 2 at hs

## 2025-03-26 DIAGNOSIS — E11.9 TYPE 2 DIABETES MELLITUS WITHOUT COMPLICATION, WITHOUT LONG-TERM CURRENT USE OF INSULIN: ICD-10-CM

## 2025-03-26 RX ORDER — GABAPENTIN 300 MG/1
300 CAPSULE ORAL 3 TIMES DAILY
Qty: 270 CAPSULE | Refills: 0 | Status: SHIPPED | OUTPATIENT
Start: 2025-03-26 | End: 2025-06-24

## 2025-05-21 ENCOUNTER — OFFICE VISIT (OUTPATIENT)
Dept: PRIMARY CARE CLINIC | Age: 75
End: 2025-05-21
Payer: COMMERCIAL

## 2025-05-21 VITALS
SYSTOLIC BLOOD PRESSURE: 114 MMHG | BODY MASS INDEX: 36.64 KG/M2 | WEIGHT: 241 LBS | HEART RATE: 76 BPM | DIASTOLIC BLOOD PRESSURE: 74 MMHG | OXYGEN SATURATION: 96 %

## 2025-05-21 DIAGNOSIS — I10 ESSENTIAL HYPERTENSION: ICD-10-CM

## 2025-05-21 DIAGNOSIS — I50.32 CHRONIC DIASTOLIC HEART FAILURE (HCC): ICD-10-CM

## 2025-05-21 DIAGNOSIS — E11.9 TYPE 2 DIABETES MELLITUS WITHOUT COMPLICATION, WITHOUT LONG-TERM CURRENT USE OF INSULIN (HCC): Primary | ICD-10-CM

## 2025-05-21 DIAGNOSIS — E78.5 DYSLIPIDEMIA: ICD-10-CM

## 2025-05-21 DIAGNOSIS — F41.9 ANXIETY: ICD-10-CM

## 2025-05-21 PROCEDURE — 99214 OFFICE O/P EST MOD 30 MIN: CPT | Performed by: FAMILY MEDICINE

## 2025-05-21 PROCEDURE — 3078F DIAST BP <80 MM HG: CPT | Performed by: FAMILY MEDICINE

## 2025-05-21 PROCEDURE — 1123F ACP DISCUSS/DSCN MKR DOCD: CPT | Performed by: FAMILY MEDICINE

## 2025-05-21 PROCEDURE — 1159F MED LIST DOCD IN RCRD: CPT | Performed by: FAMILY MEDICINE

## 2025-05-21 PROCEDURE — 3044F HG A1C LEVEL LT 7.0%: CPT | Performed by: FAMILY MEDICINE

## 2025-05-21 PROCEDURE — 3074F SYST BP LT 130 MM HG: CPT | Performed by: FAMILY MEDICINE

## 2025-05-21 PROCEDURE — G2211 COMPLEX E/M VISIT ADD ON: HCPCS | Performed by: FAMILY MEDICINE

## 2025-05-21 RX ORDER — NYSTATIN 100000 [USP'U]/G
POWDER TOPICAL
Qty: 60 G | Refills: 2 | Status: SHIPPED | OUTPATIENT
Start: 2025-05-21

## 2025-05-21 SDOH — ECONOMIC STABILITY: FOOD INSECURITY: WITHIN THE PAST 12 MONTHS, YOU WORRIED THAT YOUR FOOD WOULD RUN OUT BEFORE YOU GOT MONEY TO BUY MORE.: NEVER TRUE

## 2025-05-21 SDOH — ECONOMIC STABILITY: FOOD INSECURITY: WITHIN THE PAST 12 MONTHS, THE FOOD YOU BOUGHT JUST DIDN'T LAST AND YOU DIDN'T HAVE MONEY TO GET MORE.: NEVER TRUE

## 2025-05-21 ASSESSMENT — PATIENT HEALTH QUESTIONNAIRE - PHQ9
SUM OF ALL RESPONSES TO PHQ QUESTIONS 1-9: 0
3. TROUBLE FALLING OR STAYING ASLEEP: NOT AT ALL
4. FEELING TIRED OR HAVING LITTLE ENERGY: NOT AT ALL
5. POOR APPETITE OR OVEREATING: NOT AT ALL
7. TROUBLE CONCENTRATING ON THINGS, SUCH AS READING THE NEWSPAPER OR WATCHING TELEVISION: NOT AT ALL
SUM OF ALL RESPONSES TO PHQ QUESTIONS 1-9: 0
2. FEELING DOWN, DEPRESSED OR HOPELESS: NOT AT ALL
SUM OF ALL RESPONSES TO PHQ QUESTIONS 1-9: 0
8. MOVING OR SPEAKING SO SLOWLY THAT OTHER PEOPLE COULD HAVE NOTICED. OR THE OPPOSITE, BEING SO FIGETY OR RESTLESS THAT YOU HAVE BEEN MOVING AROUND A LOT MORE THAN USUAL: NOT AT ALL
10. IF YOU CHECKED OFF ANY PROBLEMS, HOW DIFFICULT HAVE THESE PROBLEMS MADE IT FOR YOU TO DO YOUR WORK, TAKE CARE OF THINGS AT HOME, OR GET ALONG WITH OTHER PEOPLE: NOT DIFFICULT AT ALL
1. LITTLE INTEREST OR PLEASURE IN DOING THINGS: NOT AT ALL
9. THOUGHTS THAT YOU WOULD BE BETTER OFF DEAD, OR OF HURTING YOURSELF: NOT AT ALL
SUM OF ALL RESPONSES TO PHQ QUESTIONS 1-9: 0
6. FEELING BAD ABOUT YOURSELF - OR THAT YOU ARE A FAILURE OR HAVE LET YOURSELF OR YOUR FAMILY DOWN: NOT AT ALL

## 2025-05-21 NOTE — PROGRESS NOTES
[facility]      Patient:  Derick Posadas 75 y.o. male     Date of Service: 05/21/25        Chiefcomplaint:   Chief Complaint   Patient presents with    Medication Check     Tolerating meds well    Diabetes    Cold Symptoms     Diarrhea, congestion         History of Present Illness     Reviewed most recent A1c is 6.6  Patient is currently maintained on glipizide 5 mg daily.  Tolerates medications well without side effects or intolerances.    No current hyper/hypoglycemic symptoms.    Also notes a chronic history of CHF,  No current reports of symptoms at this time such as paroxysmal nocturnal dyspnea, orthopnea.   Does not check daily weights/dry weight.      Allergies:    Sulfa antibiotics and Pcn [penicillins]    Medication List:    Current Outpatient Medications   Medication Sig Dispense Refill    gabapentin (NEURONTIN) 300 MG capsule Take 1 capsule by mouth 3 times daily for 90 days. 270 capsule 0    pantoprazole (PROTONIX) 40 MG tablet Take 1 tablet by mouth every morning (before breakfast) 30 tablet 5    glipiZIDE (GLUCOTROL) 5 MG tablet Take 1 tablet by mouth once daily with meal 90 tablet 1    atorvastatin (LIPITOR) 40 MG tablet Take 1 tablet by mouth daily 90 tablet 1    furosemide (LASIX) 20 MG tablet Take 3 tablets by mouth daily 270 tablet 1    benazepril (LOTENSIN) 5 MG tablet Take 1 tablet by mouth daily 90 tablet 1    busPIRone (BUSPAR) 15 MG tablet Take 15 mg by mouth daily 90 tablet 1    dapagliflozin (FARXIGA) 10 MG tablet Take 1 tablet by mouth every morning 90 tablet 0    potassium chloride (K-TAB) 20 MEQ TBCR extended release tablet Take 1 tablet by mouth 3 times daily 270 tablet 1    latanoprost (XALATAN) 0.005 % ophthalmic solution INSTILL 1 DROP INTO RIGHT EYE AT BEDTIME AS DIRECTED      ascorbic acid (VITAMIN C) 500 MG tablet Take 1 tablet by mouth daily 3 in am and 2 at hs      aspirin 81 MG EC tablet Take 1 tablet by mouth daily      nystatin (MYCOSTATIN) [id number]

## 2025-06-12 DIAGNOSIS — I50.32 CHRONIC DIASTOLIC HEART FAILURE (HCC): ICD-10-CM

## 2025-06-12 DIAGNOSIS — I10 ESSENTIAL HYPERTENSION: ICD-10-CM

## 2025-06-12 RX ORDER — BENAZEPRIL HYDROCHLORIDE 5 MG/1
5 TABLET ORAL DAILY
Qty: 90 TABLET | Refills: 1 | Status: SHIPPED | OUTPATIENT
Start: 2025-06-12

## 2025-08-22 DIAGNOSIS — I50.32 CHRONIC DIASTOLIC HEART FAILURE (HCC): ICD-10-CM

## 2025-08-22 DIAGNOSIS — E78.5 DYSLIPIDEMIA: ICD-10-CM

## 2025-08-25 RX ORDER — ATORVASTATIN CALCIUM 40 MG/1
40 TABLET, FILM COATED ORAL DAILY
Qty: 90 TABLET | Refills: 1 | Status: SHIPPED | OUTPATIENT
Start: 2025-08-25

## (undated) DEVICE — GLOVE SURG SZ 65 L12IN FNGR THK87MIL WHT LTX FREE

## (undated) DEVICE — SNARE EXACTO COLD

## (undated) DEVICE — BETADINE 5% EYE SOL

## (undated) DEVICE — Device: Brand: ALLEGRO 1X SILICONE I/A HANDPIECE (6)

## (undated) DEVICE — KNIFE OPHTH DIA22MM 45DEG SLT W HNDL SHRP ANG PNT DEL DBL

## (undated) DEVICE — SOLUTION IV IRRIG POUR BRL 0.9% SODIUM CHL 2F7124

## (undated) DEVICE — GLOVE SURG SZ 7 L12IN FNGR THK87MIL WHT LTX FREE

## (undated) DEVICE — CANNULA ORAL NSL AD CO2 N INTUB O2 DEL DISP TRU LNK

## (undated) DEVICE — DRESSING TRNSPAR FLM 2.4X2.8IN SURESITE 123

## (undated) DEVICE — SHIELD EYE W3XL2.5IN UNIV CLR PLAS LTWT

## (undated) DEVICE — AIRLIFE™ NASAL OXYGEN CANNULA CURVED, FLARED TIP, WITH 7 FEET (2.1 M) CRUSH RESISTANT TUBING, OVER-THE-EAR STYLE: Brand: AIRLIFE™

## (undated) DEVICE — SOFT SHIELD® COLLAGEN SHIELD, 12 HOURS (CE): Brand: SOFT SHIELD® COLLAGEN SHIELDS

## (undated) DEVICE — BLADE 30D THK3.4MM 10.5X1.9MM ANG STAB

## (undated) DEVICE — Device

## (undated) DEVICE — SYRINGE MED 10ML TRNSLUC BRL PLUNG BLK MRK POLYPR CTRL

## (undated) DEVICE — MARKER,SKIN,WI/RULER AND LABELS: Brand: MEDLINE

## (undated) DEVICE — FORCEPS BX L240CM JAW DIA2.4MM ORNG L CAP W/ NDL DISP RAD

## (undated) DEVICE — SOLUTION IV IRRIG WATER 1000ML POUR BRL 2F7114

## (undated) DEVICE — AMVISC PLUS  0.8ML: Brand: AMVISC PLUS

## (undated) DEVICE — LENS GONIOSCOPY 1.2X 90DEG OCU SURG SECUREFLEX HF

## (undated) DEVICE — MEDI-VAC NON-CONDUCTIVE TUBING7MM X 30.5 (100FT): Brand: CARDINAL HEALTH